# Patient Record
Sex: FEMALE | Race: WHITE | NOT HISPANIC OR LATINO | Employment: OTHER | URBAN - METROPOLITAN AREA
[De-identification: names, ages, dates, MRNs, and addresses within clinical notes are randomized per-mention and may not be internally consistent; named-entity substitution may affect disease eponyms.]

---

## 2017-02-27 ENCOUNTER — HISTORICAL (OUTPATIENT)
Dept: INTERNAL MEDICINE | Facility: CLINIC | Age: 52
End: 2017-02-27

## 2017-05-08 ENCOUNTER — HISTORICAL (OUTPATIENT)
Dept: ADMINISTRATIVE | Facility: HOSPITAL | Age: 52
End: 2017-05-08

## 2017-05-08 LAB
COLOR STL: NORMAL
CONSISTENCY STL: NORMAL
HEMOCCULT SP1 STL QL: NEGATIVE

## 2017-05-12 LAB
COLOR STL: NORMAL
CONSISTENCY STL: NORMAL
HEMOCCULT SP2 STL QL: NEGATIVE

## 2017-05-20 LAB
COLOR STL: NORMAL
CONSISTENCY STL: NORMAL

## 2017-05-24 ENCOUNTER — HISTORICAL (OUTPATIENT)
Dept: ADMINISTRATIVE | Facility: HOSPITAL | Age: 52
End: 2017-05-24

## 2017-09-20 ENCOUNTER — HISTORICAL (OUTPATIENT)
Dept: ADMINISTRATIVE | Facility: HOSPITAL | Age: 52
End: 2017-09-20

## 2017-10-03 ENCOUNTER — HISTORICAL (OUTPATIENT)
Dept: ADMINISTRATIVE | Facility: HOSPITAL | Age: 52
End: 2017-10-03

## 2018-04-04 ENCOUNTER — HISTORICAL (OUTPATIENT)
Dept: ADMINISTRATIVE | Facility: HOSPITAL | Age: 53
End: 2018-04-04

## 2018-04-11 ENCOUNTER — HISTORICAL (OUTPATIENT)
Dept: ADMINISTRATIVE | Facility: HOSPITAL | Age: 53
End: 2018-04-11

## 2018-05-17 ENCOUNTER — HISTORICAL (OUTPATIENT)
Dept: ADMINISTRATIVE | Facility: HOSPITAL | Age: 53
End: 2018-05-17

## 2018-06-08 ENCOUNTER — HISTORICAL (OUTPATIENT)
Dept: ADMINISTRATIVE | Facility: HOSPITAL | Age: 53
End: 2018-06-08

## 2018-06-26 ENCOUNTER — HISTORICAL (OUTPATIENT)
Dept: ADMINISTRATIVE | Facility: HOSPITAL | Age: 53
End: 2018-06-26

## 2019-01-04 ENCOUNTER — HISTORICAL (OUTPATIENT)
Dept: ADMINISTRATIVE | Facility: HOSPITAL | Age: 54
End: 2019-01-04

## 2019-02-05 ENCOUNTER — HISTORICAL (OUTPATIENT)
Dept: ADMINISTRATIVE | Facility: HOSPITAL | Age: 54
End: 2019-02-05

## 2019-07-16 ENCOUNTER — HISTORICAL (OUTPATIENT)
Dept: ADMINISTRATIVE | Facility: HOSPITAL | Age: 54
End: 2019-07-16

## 2019-07-25 ENCOUNTER — HISTORICAL (OUTPATIENT)
Dept: ADMINISTRATIVE | Facility: HOSPITAL | Age: 54
End: 2019-07-25

## 2019-08-22 ENCOUNTER — HISTORICAL (OUTPATIENT)
Dept: ADMINISTRATIVE | Facility: HOSPITAL | Age: 54
End: 2019-08-22

## 2019-08-28 ENCOUNTER — HISTORICAL (OUTPATIENT)
Dept: ADMINISTRATIVE | Facility: HOSPITAL | Age: 54
End: 2019-08-28

## 2019-09-27 ENCOUNTER — HISTORICAL (OUTPATIENT)
Dept: ADMINISTRATIVE | Facility: HOSPITAL | Age: 54
End: 2019-09-27

## 2019-12-13 ENCOUNTER — HISTORICAL (OUTPATIENT)
Dept: ADMINISTRATIVE | Facility: HOSPITAL | Age: 54
End: 2019-12-13

## 2019-12-13 LAB
ABS NEUT (OLG): 2.85 X10(3)/MCL (ref 2.1–9.2)
ALBUMIN SERPL-MCNC: 3.9 GM/DL (ref 3.4–5)
ALBUMIN/GLOB SERPL: 1 RATIO (ref 1–2)
ALP SERPL-CCNC: 116 UNIT/L (ref 45–117)
ALT SERPL-CCNC: 46 UNIT/L (ref 13–56)
AST SERPL-CCNC: 18 UNIT/L (ref 15–37)
BILIRUB SERPL-MCNC: 0.2 MG/DL (ref 0.2–1)
BILIRUBIN DIRECT+TOT PNL SERPL-MCNC: <0.1 MG/DL (ref 0–0.2)
BILIRUBIN DIRECT+TOT PNL SERPL-MCNC: >0.1 MG/DL (ref 0–1)
BUN SERPL-MCNC: 21 MG/DL (ref 7–18)
CALCIUM SERPL-MCNC: 8.8 MG/DL (ref 8.5–10.1)
CHLORIDE SERPL-SCNC: 107 MMOL/L (ref 98–107)
CHOLEST SERPL-MCNC: 196 MG/DL (ref 0–199)
CHOLEST/HDLC SERPL: 4 {RATIO}
CO2 SERPL-SCNC: 30 MMOL/L (ref 21–32)
CORTIS SERPL-SCNC: 5 MCG/DL
CREAT SERPL-MCNC: 0.6 MG/DL (ref 0.55–1.02)
DEPRECATED CALCIDIOL+CALCIFEROL SERPL-MC: 15 NG/ML (ref 30–80)
ERYTHROCYTE [DISTWIDTH] IN BLOOD BY AUTOMATED COUNT: 13.4 % (ref 11.5–17)
EST. AVERAGE GLUCOSE BLD GHB EST-MCNC: 103 MG/DL
ESTRADIOL SERPL HS-MCNC: 21 PG/ML
FSH SERPL-ACNC: 58.8 MIU/ML
FT4I SERPL CALC-MCNC: 1.93 (ref 2.6–3.6)
GLOBULIN SER-MCNC: 4 GM/DL (ref 2–4)
GLUCOSE SERPL-MCNC: 91 MG/DL (ref 74–106)
HBA1C MFR BLD: 5.2 % (ref 4.2–6.3)
HCT VFR BLD AUTO: 39.7 % (ref 37–47)
HDLC SERPL-MCNC: 50 MG/DL
HGB BLD-MCNC: 12.6 GM/DL (ref 12–16)
LDLC SERPL CALC-MCNC: 118 MG/DL (ref 0–129)
MCH RBC QN AUTO: 30.1 PG (ref 27–31)
MCHC RBC AUTO-ENTMCNC: 31.7 GM/DL (ref 33–36)
MCV RBC AUTO: 95 FL (ref 80–94)
NRBC BLD AUTO-RTO: 0 % (ref 0–0.2)
PLATELET # BLD AUTO: 294 X10(3)/MCL (ref 130–400)
PMV BLD AUTO: 9.5 FL (ref 7.4–10.4)
POTASSIUM SERPL-SCNC: 3.9 MMOL/L (ref 3.5–5.1)
PROGEST SERPL-MCNC: <0.21 NG/ML
PROT SERPL-MCNC: 7.7 GM/DL (ref 6.4–8.2)
RBC # BLD AUTO: 4.18 X10(6)/MCL (ref 4.2–5.4)
SODIUM SERPL-SCNC: 140 MMOL/L (ref 136–145)
T3FREE SERPL-MCNC: 2.54 PG/ML (ref 2.18–3.98)
T3RU NFR SERPL: 28 % (ref 31–39)
T4 SERPL-MCNC: 6.9 MCG/DL (ref 4.7–13.3)
TESTOST SERPL-MCNC: 11 NG/DL (ref 14–76)
TRIGL SERPL-MCNC: 142 MG/DL (ref 0–149)
TSH SERPL-ACNC: 2.36 MIU/ML (ref 0.36–3.74)
VIT B12 SERPL-MCNC: 455 PG/ML (ref 193–986)
VLDLC SERPL CALC-MCNC: 28 MG/DL
WBC # SPEC AUTO: 5.4 X10(3)/MCL (ref 4.5–11.5)

## 2020-01-02 ENCOUNTER — HISTORICAL (OUTPATIENT)
Dept: ADMINISTRATIVE | Facility: HOSPITAL | Age: 55
End: 2020-01-02

## 2020-02-11 ENCOUNTER — HISTORICAL (OUTPATIENT)
Dept: ADMINISTRATIVE | Facility: HOSPITAL | Age: 55
End: 2020-02-11

## 2020-03-20 ENCOUNTER — HISTORICAL (OUTPATIENT)
Dept: ADMINISTRATIVE | Facility: HOSPITAL | Age: 55
End: 2020-03-20

## 2020-07-28 ENCOUNTER — HISTORICAL (OUTPATIENT)
Dept: ADMINISTRATIVE | Facility: HOSPITAL | Age: 55
End: 2020-07-28

## 2020-07-28 LAB
ALBUMIN SERPL-MCNC: 3.6 GM/DL (ref 3.5–5)
ALBUMIN/GLOB SERPL: 1 RATIO (ref 1.1–2)
ALP SERPL-CCNC: 104 UNIT/L (ref 40–150)
ALT SERPL-CCNC: 48 UNIT/L (ref 0–55)
AST SERPL-CCNC: 36 UNIT/L (ref 5–34)
BILIRUB SERPL-MCNC: 0.3 MG/DL (ref 0.2–1.2)
BILIRUBIN DIRECT+TOT PNL SERPL-MCNC: 0.1 MG/DL (ref 0–0.5)
BILIRUBIN DIRECT+TOT PNL SERPL-MCNC: 0.2 MG/DL (ref 0–0.8)
BUN SERPL-MCNC: 13.3 MG/DL (ref 9.8–20.1)
CALCIUM SERPL-MCNC: 9.7 MG/DL (ref 8.4–10.2)
CHLORIDE SERPL-SCNC: 103 MMOL/L (ref 98–107)
CO2 SERPL-SCNC: 30 MMOL/L (ref 22–29)
CREAT SERPL-MCNC: 0.78 MG/DL (ref 0.57–1.11)
GLOBULIN SER-MCNC: 3.6 GM/DL (ref 2.4–3.5)
GLUCOSE SERPL-MCNC: 87 MG/DL (ref 74–100)
POTASSIUM SERPL-SCNC: 4.4 MMOL/L (ref 3.5–5.1)
PROT SERPL-MCNC: 7.2 GM/DL (ref 6.4–8.3)
SODIUM SERPL-SCNC: 142 MMOL/L (ref 136–145)

## 2020-08-20 ENCOUNTER — HISTORICAL (OUTPATIENT)
Dept: ADMINISTRATIVE | Facility: HOSPITAL | Age: 55
End: 2020-08-20

## 2020-08-20 LAB
ABS NEUT (OLG): 2.85 X10(3)/MCL (ref 2.1–9.2)
ALBUMIN SERPL-MCNC: 3.6 GM/DL (ref 3.5–5)
ALBUMIN/GLOB SERPL: 1 RATIO (ref 1.1–2)
ALP SERPL-CCNC: 102 UNIT/L (ref 40–150)
ALT SERPL-CCNC: 35 UNIT/L (ref 0–55)
AST SERPL-CCNC: 26 UNIT/L (ref 5–34)
BASOPHILS # BLD AUTO: 0.02 X10(3)/MCL (ref 0–0.2)
BASOPHILS NFR BLD AUTO: 0.3 % (ref 0–1)
BILIRUB SERPL-MCNC: 0.3 MG/DL (ref 0.2–1.2)
BILIRUBIN DIRECT+TOT PNL SERPL-MCNC: 0.1 MG/DL (ref 0–0.5)
BILIRUBIN DIRECT+TOT PNL SERPL-MCNC: 0.2 MG/DL (ref 0–0.8)
BUN SERPL-MCNC: 20 MG/DL (ref 9.8–20.1)
CALCIUM SERPL-MCNC: 9.3 MG/DL (ref 8.4–10.2)
CHLORIDE SERPL-SCNC: 107 MMOL/L (ref 98–107)
CHOLEST SERPL-MCNC: 245 MG/DL
CHOLEST/HDLC SERPL: 5 {RATIO} (ref 0–5)
CO2 SERPL-SCNC: 28 MMOL/L (ref 22–29)
CREAT SERPL-MCNC: 0.78 MG/DL (ref 0.57–1.11)
DEPRECATED CALCIDIOL+CALCIFEROL SERPL-MC: 110.2 NG/ML (ref 6.6–49.9)
EOSINOPHIL # BLD AUTO: 0.17 X10(3)/MCL (ref 0–0.9)
EOSINOPHIL NFR BLD AUTO: 2.7 % (ref 0–6.4)
ERYTHROCYTE [DISTWIDTH] IN BLOOD BY AUTOMATED COUNT: 13.5 % (ref 11.5–17)
FT4I SERPL CALC-MCNC: 1.83 (ref 2.6–3.6)
GLOBULIN SER-MCNC: 3.5 GM/DL (ref 2.4–3.5)
GLUCOSE SERPL-MCNC: 97 MG/DL (ref 74–100)
HCT VFR BLD AUTO: 37.8 % (ref 37–47)
HDLC SERPL-MCNC: 46 MG/DL (ref 40–60)
HGB BLD-MCNC: 11.9 GM/DL (ref 12–16)
IMM GRANULOCYTES # BLD AUTO: 0.01 10*3/UL (ref 0–0.02)
IMM GRANULOCYTES NFR BLD AUTO: 0.2 % (ref 0–0.43)
LDLC SERPL CALC-MCNC: 166 MG/DL (ref 50–140)
LYMPHOCYTES # BLD AUTO: 2.85 X10(3)/MCL (ref 0.6–4.6)
LYMPHOCYTES NFR BLD AUTO: 45.2 % (ref 16–44)
MCH RBC QN AUTO: 29.3 PG (ref 27–31)
MCHC RBC AUTO-ENTMCNC: 31.5 GM/DL (ref 33–36)
MCV RBC AUTO: 93.1 FL (ref 80–94)
MONOCYTES # BLD AUTO: 0.4 X10(3)/MCL (ref 0.1–1.3)
MONOCYTES NFR BLD AUTO: 6.3 % (ref 4–12.1)
NEUTROPHILS # BLD AUTO: 2.85 X10(3)/MCL (ref 2.1–9.2)
NEUTROPHILS NFR BLD AUTO: 45.3 % (ref 43–73)
NRBC BLD AUTO-RTO: 0 % (ref 0–0.2)
PLATELET # BLD AUTO: 288 X10(3)/MCL (ref 130–400)
PMV BLD AUTO: 10.4 FL (ref 7.4–10.4)
POTASSIUM SERPL-SCNC: 4.2 MMOL/L (ref 3.5–5.1)
PROT SERPL-MCNC: 7.1 GM/DL (ref 6.4–8.3)
RBC # BLD AUTO: 4.06 X10(6)/MCL (ref 4.2–5.4)
SODIUM SERPL-SCNC: 141 MMOL/L (ref 136–145)
T3FREE SERPL-MCNC: 2.67 PG/ML (ref 1.71–3.71)
T3RU NFR SERPL: 30.21 % (ref 31–39)
T4 SERPL-MCNC: 6.06 UG/DL (ref 4.87–11.72)
TRIGL SERPL-MCNC: 164 MG/DL (ref 0–150)
TSH SERPL-ACNC: 1.69 UIU/ML (ref 0.35–4.94)
VLDLC SERPL CALC-MCNC: 33 MG/DL
WBC # SPEC AUTO: 6.3 X10(3)/MCL (ref 4.5–11.5)

## 2021-06-01 ENCOUNTER — HISTORICAL (OUTPATIENT)
Dept: ADMINISTRATIVE | Facility: HOSPITAL | Age: 56
End: 2021-06-01

## 2021-06-17 ENCOUNTER — HISTORICAL (OUTPATIENT)
Dept: ADMINISTRATIVE | Facility: HOSPITAL | Age: 56
End: 2021-06-17

## 2021-06-17 LAB
ABS NEUT (OLG): 2.74 X10(3)/MCL (ref 2.1–9.2)
ALBUMIN SERPL-MCNC: 3.6 GM/DL (ref 3.5–5)
ALBUMIN/GLOB SERPL: 1 RATIO (ref 1.1–2)
ALP SERPL-CCNC: 97 UNIT/L (ref 40–150)
ALT SERPL-CCNC: 24 UNIT/L (ref 0–55)
AST SERPL-CCNC: 22 UNIT/L (ref 5–34)
BASOPHILS # BLD AUTO: 0.02 X10(3)/MCL (ref 0–0.2)
BASOPHILS NFR BLD AUTO: 0.3 % (ref 0–1)
BILIRUB SERPL-MCNC: 0.3 MG/DL (ref 0.2–1.2)
BILIRUBIN DIRECT+TOT PNL SERPL-MCNC: 0.1 MG/DL (ref 0–0.5)
BILIRUBIN DIRECT+TOT PNL SERPL-MCNC: 0.2 MG/DL (ref 0–0.8)
BUN SERPL-MCNC: 19.4 MG/DL (ref 9.8–20.1)
CALCIUM SERPL-MCNC: 9.3 MG/DL (ref 8.4–10.2)
CHLORIDE SERPL-SCNC: 105 MMOL/L (ref 98–107)
CHOLEST SERPL-MCNC: 215 MG/DL
CHOLEST/HDLC SERPL: 5 {RATIO} (ref 0–5)
CO2 SERPL-SCNC: 27 MMOL/L (ref 22–29)
CREAT SERPL-MCNC: 0.69 MG/DL (ref 0.57–1.11)
DEPRECATED CALCIDIOL+CALCIFEROL SERPL-MC: 43.2 NG/ML (ref 30–80)
EOSINOPHIL # BLD AUTO: 0.3 X10(3)/MCL (ref 0–0.9)
EOSINOPHIL NFR BLD AUTO: 4.9 % (ref 0–6.4)
ERYTHROCYTE [DISTWIDTH] IN BLOOD BY AUTOMATED COUNT: 13.7 % (ref 11.5–17)
FT4I SERPL CALC-MCNC: 1.45 (ref 2.6–3.6)
GLOBULIN SER-MCNC: 3.5 GM/DL (ref 2.4–3.5)
GLUCOSE SERPL-MCNC: 96 MG/DL (ref 74–100)
HCT VFR BLD AUTO: 37.3 % (ref 37–47)
HDLC SERPL-MCNC: 46 MG/DL (ref 40–60)
HGB BLD-MCNC: 11.7 GM/DL (ref 12–16)
IMM GRANULOCYTES # BLD AUTO: 0.02 10*3/UL (ref 0–0.02)
IMM GRANULOCYTES NFR BLD AUTO: 0.3 % (ref 0–0.43)
LDLC SERPL CALC-MCNC: 138 MG/DL (ref 50–140)
LYMPHOCYTES # BLD AUTO: 2.69 X10(3)/MCL (ref 0.6–4.6)
LYMPHOCYTES NFR BLD AUTO: 43.5 % (ref 16–44)
MCH RBC QN AUTO: 28.8 PG (ref 27–31)
MCHC RBC AUTO-ENTMCNC: 31.4 GM/DL (ref 33–36)
MCV RBC AUTO: 91.9 FL (ref 80–94)
MONOCYTES # BLD AUTO: 0.41 X10(3)/MCL (ref 0.1–1.3)
MONOCYTES NFR BLD AUTO: 6.6 % (ref 4–12.1)
NEUTROPHILS # BLD AUTO: 2.74 X10(3)/MCL (ref 2.1–9.2)
NEUTROPHILS NFR BLD AUTO: 44.4 % (ref 43–73)
NRBC BLD AUTO-RTO: 0 % (ref 0–0.2)
PLATELET # BLD AUTO: 299 X10(3)/MCL (ref 130–400)
PMV BLD AUTO: 9.8 FL (ref 7.4–10.4)
POTASSIUM SERPL-SCNC: 3.6 MMOL/L (ref 3.5–5.1)
PROT SERPL-MCNC: 7.1 GM/DL (ref 6.4–8.3)
RBC # BLD AUTO: 4.06 X10(6)/MCL (ref 4.2–5.4)
SODIUM SERPL-SCNC: 140 MMOL/L (ref 136–145)
T3FREE SERPL-MCNC: 2.6 PG/ML (ref 1.58–3.91)
T3RU NFR SERPL: 27.9 % (ref 31–39)
T4 SERPL-MCNC: 5.18 UG/DL (ref 4.87–11.72)
TRIGL SERPL-MCNC: 154 MG/DL (ref 0–150)
TSH SERPL-ACNC: 2.69 UIU/ML (ref 0.35–4.94)
VLDLC SERPL CALC-MCNC: 31 MG/DL
WBC # SPEC AUTO: 6.2 X10(3)/MCL (ref 4.5–11.5)

## 2022-04-04 ENCOUNTER — HISTORICAL (OUTPATIENT)
Dept: SURGERY | Facility: HOSPITAL | Age: 57
End: 2022-04-04

## 2022-04-07 ENCOUNTER — HISTORICAL (OUTPATIENT)
Dept: ADMINISTRATIVE | Facility: HOSPITAL | Age: 57
End: 2022-04-07

## 2022-04-23 VITALS
WEIGHT: 209 LBS | DIASTOLIC BLOOD PRESSURE: 74 MMHG | HEIGHT: 62 IN | BODY MASS INDEX: 38.46 KG/M2 | SYSTOLIC BLOOD PRESSURE: 113 MMHG

## 2022-04-30 NOTE — ED PROVIDER NOTES
Patient:   Adrianna Childers             MRN: [id number]            FIN: [id number]               Age:   52 years     Sex:  Female     :  1965   Associated Diagnoses:   Pain in toe; Plantar fasciitis   Author:   Kleber Christianson      Basic Information   Time seen: Immediately upon arrival.   History source: Patient.   Arrival mode: Private vehicle.   History limitation: None.   Additional information: Chief Complaint from Nursing Triage Note : Chief Complaint   2017 18:30 CDT      Chief Complaint           c/o bilateral feet pain x3 weeks; denies new trauma; reports 5th toe pain on left foot after hitting it on daughters wheelchair  .      History of Present Illness   The patient presents with bilateral, toe pain, patient reports bilateral 5th toe pain after bumping a wheelchair while at home; patient also reports bilateral foot arch pain.  The onset was 3  weeks ago.  The course/duration of symptoms is constant.  Type of injury: direct blow.  The character of symptoms is pain.  The degree at present is minimal.  There are exacerbating factors including movement, walking and palpation.  The relieving factor is none.  The location where the incident occurred was at home.  Risk factors consist of none.  Prior episodes: none.  Therapy today: none.  Associated symptoms: denies altered sensation, denies nausea, denies vomiting, denies inability to bear weight, denies fever, denies chills, denies back pain, denies neck pain and denies suspected foreign body.        Review of Systems   Constitutional symptoms:  Negative except as documented in HPI.   Skin symptoms:  Negative except as documented in HPI.   Eye symptoms:  Negative except as documented in HPI.   ENMT symptoms:  Negative except as documented in HPI.   Respiratory symptoms:  Negative except as documented in HPI.   Cardiovascular symptoms:  Negative except as documented in HPI.   Gastrointestinal symptoms:  Negative except as documented  in HPI.   Genitourinary symptoms:  Negative except as documented in HPI.   Musculoskeletal symptoms:  Reports: Foot, pain.   Neurologic symptoms:  Negative except as documented in HPI.   Psychiatric symptoms:  Negative except as documented in HPI.   Endocrine symptoms:  Negative except as documented in HPI.   Hematologic/Lymphatic symptoms:  Negative except as documented in HPI.   Allergy/immunologic symptoms:  Negative except as documented in HPI.             Additional review of systems information: All other systems reviewed and otherwise negative.      Health Status   Allergies:    Allergic Reactions (Selected)  No Known Allergies  No Known Medication Allergies,    Allergies (2) Active Reaction  No Known Allergies None Documented  No Known Medication Allergies None Documented  .   Medications:  (Selected)   Prescriptions  Prescribed  Cymbalta 20 mg oral delayed release capsule: 20 mg = 1 cap(s), Oral, BID, (do not crush or chew), # 60 cap(s), 0 Refill(s), other reason (Rx)  Senokot 8.6 mg oral tablet: 17.2 mg = 2 tab(s), Oral, Once a day (at bedtime), PRN PRN as needed for constipation, # 60 tab(s), 0 Refill(s), Pharmacy: [id number]  gabapentin 300 mg oral capsule: 300 mg = 1 cap(s), Oral, TID, # 90 cap(s), 0 Refill(s)  Documented Medications  Documented  DULOXETINE HCL DR 30MG CER: 30 mg = 1 cap(s), Oral, BID  Feosol 200 mg (65 mg elemental iron) oral tablet: 0 Refill(s)  Iron 100 Plus oral tablet: 0 Refill(s)  METHOCARBAMOL 750 MG TABLET:   NAPROXEN 500 MG TABLET: 500 mg = 1 tab(s), Oral, BID  Norco 10 mg-325 mg oral tablet: 1 tab(s), Oral, TID, PRN PRN for pain, 0 Refill(s)  PREDNISONE 10 MG TABLET:   Xanax 0.5 mg oral tablet: 0.5 mg = 1 tab(s), Oral, At Bedtime, PRN for anxiety, # 30 tab(s), 0 Refill(s)  melatonin 10 mg oral capsule: 10 mg = 1 cap(s), Oral, Once a day (at bedtime), 0 Refill(s)  naproxen 500 mg (as sodium) oral tablet extended release: 500 mg = 1 tab(s), Oral, Daily, 0 Refill(s).    Immunizations: Per nurse's notes.   Menstrual history: Per nurse's notes.      Past Medical/ Family/ Social History   Medical history:    Active  Arthritis (3744065)  Chronic neck pain (3875211895).   Surgical history:     delivery ([id number]).  Dilation and curettage (33746018).  C SEC X2.   section ([id number]).  Comments:  2017 14:45 - Ramesh MASTERS, Kala garcia 2.   Family history:    Lupus  Brother  Cirrhosis of liver  Sister  Kidney disease  Brother  Acute myocardial infarction.  Sister  .   Social history: Reviewed as documented in chart.   Problem list: Per nurse's notes.      Physical Examination               Vital Signs   Vital Signs   2017 18:30 CDT      Temperature Oral          36.6 DegC                             Peripheral Pulse Rate     85 bpm                             Respiratory Rate          18 br/min                             SpO2                      99 %                             Oxygen Therapy            Room air                             Systolic Blood Pressure   140 mmHg                             Diastolic Blood Pressure  70 mmHg  .      Vital Signs (last 24 hrs)_____  Last Charted___________  Temp Oral     36.6 DegC  (SEP 20 18:30)  Heart Rate Peripheral   85 bpm  (SEP 20 18:30)  Resp Rate         18 br/min  (SEP 20 18:30)  SBP      140 mmHg  (SEP 20 18:30)  DBP      70 mmHg  (SEP 20 18:30)  SpO2      99 %  (SEP 20 18:30)  Weight      103.3 kg  (SEP 20 18:30)  .   Measurements   2017 18:30 CDT      Weight Dosing             103.3 kg                             Weight Measured           103.3 kg                             Weight Measured and Calculated in Lbs     227.74 lb                             Height/Length Dosing      162 cm                             Height/Length Estimated   162 cm  .   Basic Oxygen Information   2017 18:30 CDT      SpO2                      99 %                             Oxygen Therapy            Room air  .    General:  Alert, no acute distress.    Skin:  No pallor, normal for ethnicity.    Eye:  Extraocular movements are intact, normal conjunctiva.    Cardiovascular:  Regular rate and rhythm, No edema, Arterial pulses: Bilateral, lower extremity, dorsalis pedis, normal.    Respiratory:  Lungs are clear to auscultation, respirations are non-labored, breath sounds are equal, Symmetrical chest wall expansion.    Gastrointestinal:  Soft, Nontender, Non distended, Normal bowel sounds, No organomegaly.    Musculoskeletal:  Normal ROM, normal strength, no swelling, no deformity, Ankle/foot: Bilateral, plantar, foot, metatarsal # 5, tenderness.    Neurological:  Alert and oriented to person, place, time, and situation, No focal neurological deficit observed.    Psychiatric:  Cooperative, appropriate mood & affect, normal judgment, non-suicidal.       Medical Decision Making   Orders  Launch Orders   Pharmacy:  Percocet 5/325 (Order): 1 tab(s), Oral, Now, first dose 9/20/2017 19:23 CDT, stop date 9/20/2017 19:23 CDT.   Results review:     No qualifying data available.   Foot/toes x-ray findings:  No fracture      Impression and Plan   Diagnosis   Pain in toe (RHJ69-UN M79.676)   Plantar fasciitis (GLD13-GF M72.2)   Plan   Condition: Stable, pt denies abdominal pain/nausea/vomiting/syncope/chest pain/shortness of breath.    Disposition: Discharged: Time  9/20/2017 19:56:00, to home.    Patient was given the following educational materials: Musculoskeletal Pain, Plantar Fasciitis.    Follow up with: Anytime the conditions worsen, return to clinic or go to ED; Karli Gorman.    Counseled: Patient, Family, Regarding diagnosis, Regarding diagnostic results, Regarding treatment plan, Regarding prescription, Patient indicated understanding of instructions.

## 2022-04-30 NOTE — ED PROVIDER NOTES
reports left 5th toe pain after hitting it today on wheelchair. reports right atraumatic foot pain

## 2022-04-30 NOTE — ED PROVIDER NOTES
Patient:   Adrianna Childers             MRN: [id number]            FIN: [id number]               Age:   54 years     Sex:  Female     :  1965   Associated Diagnoses:   Right knee pain   Author:   Archie Cross MD      Basic Information   History source: Patient.   Arrival mode: Private vehicle.   History limitation: None.   Additional information: Chief Complaint from Nursing Triage Note : Chief Complaint   2019 11:13 CDT      Chief Complaint           c/o right knee pain that starts in the hip and radiates down to her knee.  hx knee replacement on 2019.  States she may have twisted her knee on   .   Provider/Visit info:   Time Seen:  Archie Cross MD / 2019 11:18  .   History of Present Illness   The patient presents with knee pain and knee swelling.  The onset was 5  days ago.  The course/duration of symptoms is worsening.  Type of injury: twisting.  Location: Right knee leg. The character of symptoms is pain.  The degree at present is moderate.  The exacerbating factor is movement.  The relieving factor is none.  Risk factors consist of recent surgery.  Prior episodes: none.  Therapy today: none.  Associated symptoms: none.  Additional history: none.  s/p TKA  (Dr. Luna).        Review of Systems   Constitutional symptoms:  Negative except as documented in HPI.   Skin symptoms:  Negative except as documented in HPI.   Eye symptoms:  Negative except as documented in HPI.   ENMT symptoms:  Negative except as documented in HPI.   Respiratory symptoms:  Negative except as documented in HPI.   Cardiovascular symptoms:  Negative except as documented in HPI.   Gastrointestinal symptoms:  Negative except as documented in HPI.   Genitourinary symptoms:  Negative except as documented in HPI.   Musculoskeletal symptoms:  Negative except as documented in HPI.   Neurologic symptoms:  Negative except as documented in HPI.   Psychiatric symptoms:  Negative except as  documented in HPI.   Endocrine symptoms:  Negative except as documented in HPI.   Hematologic/Lymphatic symptoms:  Negative except as documented in HPI.   Allergy/immunologic symptoms:  Negative except as documented in HPI.             Additional review of systems information: All other systems reviewed and otherwise negative.      Health Status   Allergies:    Allergic Reactions (Selected)  No Known Allergies  No Known Medication Allergies,    Allergies (2) Active Reaction  No Known Allergies None Documented  No Known Medication Allergies None Documented  .   Medications:  (Selected)   Prescriptions  Prescribed  Colace 100 mg oral capsule: 200 mg = 2 cap(s), Oral, Daily, # 56 cap(s), 0 Refill(s)  Senokot 8.6 mg oral tablet: 17.2 mg = 2 tab(s), Oral, Once a day (at bedtime), PRN PRN as needed for constipation, # 60 tab(s), 0 Refill(s), Pharmacy: [id number]  acetaminophen-hydrocodone 325 mg-10 mg oral tablet: 1 tab(s), Oral, q4hr, PRN PRN as needed for pain, # 50 tab(s), 0 Refill(s), Pharmacy: [id number]  aspirin 81 mg oral Delayed Release (EC) tablet: 81 mg = 1 tab(s), Oral, BID, # 84 tab(s), 0 Refill(s)  gabapentin 300 mg oral capsule: See Instructions, TAKE ONE CAPSULE BY MOUTH THREE TIMES DAILY, # 90 cap(s), eRx: [facility]  indomethacin 25 mg oral capsule: 25 mg = 1 cap(s), Oral, TID, PRN PRN for pain, # 30 cap(s), 0 Refill(s), Pharmacy: [id number]  Documented Medications  Documented  BUPROPION HCL  MG TABLET: 100 mg = 1 tab(s), Oral, qAM  DESVENLAFAXINE SUC ER 50 MG TB: 50 mg = 1 tab(s), Oral, Daily  DULOXETINE HCL DR 30 MG CAP: 30 mg = 1 cap(s), Oral, Daily  DULOXETINE HCL DR 60 MG CAP: 60 mg = 1 cap(s), Oral, Daily  Feosol 200 mg (65 mg elemental iron) oral tablet: 1 tab, Oral, Daily, 0 Refill(s)  Iron 100 Plus oral tablet: 1 tab(s), Oral, Daily, 0 Refill(s)  NAPROXEN 500 MG TABLET: 500 mg = 1 tab(s), Oral, BID  Xanax 0.5 mg oral tablet: 0.5 mg = 1  tab(s), Oral, At Bedtime, PRN for anxiety, # 30 tab(s), 0 Refill(s)  cyclobenzaprine 10 mg oral tablet: 10 mg = 1 tab(s), Oral, TID, 0 Refill(s)  traZODONE 50 mg oral tablet ( Desyrel ): 50 mg = 1 tab(s), Oral, qPM.      Past Medical/ Family/ Social History   Medical history:    Active  Arthritis ([id number])  Chronic neck pain (8962292456).   Surgical history:    Total Knee Arthroplasty (Right) on 2019 at 54 Years.  Comments:  2019 10:24 LYNETTE Abraham RN, Soledad WELCH  auto-populated from documented surgical case   delivery ([id number]).  Dilation and curettage (81218699).  C SEC X2.   section ([id number]).  Comments:  2017 14:45 LYNETTE Chandler RN, Kala garcia 2.   Family history:    Carotid artery disease  Mother  Lupus  Brother  Diabetes mellitus type 2  Mother  Stroke  Father  Cirrhosis of liver  Sister  Seizure  Father  Kidney disease  Brother  Acute myocardial infarction.  Sister  Mother  Hypertension.  Father  Hyperlipidemia.  Father  .   Social history:    Social & Psychosocial Habits    Alcohol  2015 Risk Assessment: Denies Alcohol Use    2016  Use: Past    Frequency: 1-2 times per year    2017  Use: Current    Type: Wine    Frequency: 1-2 times per year    2019  Use: Current    Type: Wine    Frequency: 1-2 times per year    Employment/School  2017  Description: Applying for SSI    Activity level: Moderate physical work    Highest education: High school    Hazardous equipment operation: No    2019  Status: Disabled    Exercise  2016  Self assessment: Good condition    Comment: no exercise - 2015 13:24 - Kyler Juan BRADSHAW    2019  Times per week: 3-4 times/week    Exercise type: yard work    Home/Environment  2016  Lives with: Children    Living situation: Home/Independent    Home equipment: Wheelchair, hosp. bed,lift    Alcohol abuse in household: No    Substance abuse in household: No    Smoker in household: No     Injuries/Abuse/Neglect in household: No    Feels unsafe at home: No    Safe place to go: Yes    Agency(s)/Others notified: No    Family/Friends available to help: Yes    Concern for family members at home: No    Major illness in household: No    Financial concerns: No    Concerns over TV/Computer/Game use: No    Other risks in environment: Pets/Animal exposure    Comment: has 2 children - 02/16/2016 10:06 - Santiago Flores LPN    08/28/2019  Lives with: Children    Nutrition/Health  04/03/2017  Type of diet: Regular    Caffeine intake amount: 1 soda/week    Wants to lose weight: No    Sleeping concerns: No    Feels highly stressed: Yes    Other    Comment: denies any falls over last 3 months - 10/05/2016 13:05 - Narcisa Robertson LPN    Sexual    Comment: confidential - 10/05/2016 13:05 Narcisa Kwon LPN    Substance Use  06/27/2015 Risk Assessment: Denies Substance Abuse    09/29/2015  Use: Never    Tobacco  06/27/2015 Risk Assessment: Denies Tobacco Use    09/24/2019  Use: Former smoker, quit more    Patient Wants Consult For Cessation Counseling No    09/27/2019  Use: Former smoker, quit more    Patient Wants Consult For Cessation Counseling N/A    Concerns about tobacco use in household: No    Smokeless tobacco use: Never    Abuse/Neglect  09/24/2019  SHX Any signs of abuse or neglect No    09/27/2019  SHX Any signs of abuse or neglect No    Feels unsafe at home: No    Safe place to go: Yes  .   Problem list:    Active Problems (14)  Anxiety   Arthritis   Back pain   Chronic neck pain   Degenerative disc disease, lumbar   Depression   IBS - Irritable bowel syndrome   Left knee pain   Obesity   Onychomycosis   Osteoarthritis of right knee   Rotator cuff tear   Visit for screening mammogram   Well adult exam   .      Physical Examination               Vital Signs   Vital Signs   9/27/2019 11:13 CDT      Temperature Oral          37 DegC                             Temperature Oral (calculated)              98.60 DegF                             Peripheral Pulse Rate     87 bpm                             Respiratory Rate          18 br/min                             SpO2                      97 %                             Oxygen Therapy            Room air                             Systolic Blood Pressure   111 mmHg                             Diastolic Blood Pressure  71 mmHg  .   Measurements   9/27/2019 11:13 CDT      Weight Dosing             95 kg                             Weight Measured and Calculated in Lbs     209.44 lb                             Weight Estimated          95 kg                             Height/Length Dosing      158 cm                             Height/Length Estimated   158 cm                             Body Mass Index Estimated 38.05 kg/m2  .   Basic Oxygen Information   9/27/2019 11:13 CDT      SpO2                      97 %                             Oxygen Therapy            Room air  .   General:  Alert, no acute distress.    Skin:  Warm, dry, pink.    Head:  Normocephalic, atraumatic.    Neck:  Supple, trachea midline, no tenderness, no JVD, no carotid bruit.    Eye:  Pupils are equal, round and reactive to light, extraocular movements are intact, normal conjunctiva, vision unchanged.    Ears, nose, mouth and throat:  Tympanic membranes clear, oral mucosa moist, no pharyngeal erythema or exudate.    Cardiovascular:  Regular rate and rhythm, No murmur, Normal peripheral perfusion, No edema.    Respiratory:  Lungs are clear to auscultation, respirations are non-labored, breath sounds are equal, Symmetrical chest wall expansion.    Chest wall:  No tenderness, No deformity.    Back:  Nontender, Normal range of motion, Normal alignment.    Musculoskeletal:  Normal ROM, normal strength, no tenderness, no swelling, no deformity, Lower extremity: Right, lateral, knee, tenderness, no swelling.    Gastrointestinal:  Soft, Nontender, Non distended, Normal bowel sounds, No  organomegaly.    Neurological:  Alert and oriented to person, place, time, and situation, No focal neurological deficit observed, CN II-XII intact, normal sensory observed, normal motor observed, normal speech observed, normal coordination observed.    Lymphatics:  No lymphadenopathy.   Psychiatric:  Cooperative, appropriate mood & affect, normal judgment.       Medical Decision Making   Orders  Launch Orders   Pharmacy:  [facility] (Order Processing): 100 mcg, form: Injection, IM, Once, first dose 9/27/2019 11:25 CDT, stop date 9/27/2019 11:25 CDT, STAT  Radiology:  US Venous Lower Extremity Right (Order Processing): Routine, 9/27/2019 11:25 CDT, Leg Pain, None, Stretcher, Rad Type, Schedule this test  XR Knee Right 3 Views (Order Processing): Stat, 9/27/2019 11:25 CDT, Pain, None, Stretcher, Rad Type, Not Scheduled.   Radiology results:  Rad Results (ST)  < 12 hrs   Accession: [id number]  Order: US Venous Lower Extremity Right  Report Dt/Tm: 09/27/2019 12:33  Report:   Comparison: No study for comparison     INDICATION: Evaluate for deep venous thrombosis.     FINDINGS:     The right common femoral vein, superficial femoral vein, deep femoral  vein, popliteal vein, posterior tibial, and peroneal veins appear  fully compressible and demonstrate no intraluminal thrombus.  Augmentation imaging demonstrates no evidence for proximal occlusion.     IMPRESSION:     No sonographic evidence for deep venous thrombosis to the right lower  extremity.      Accession: [id number]  Order: XR Knee Right 3 Views  Report Dt/Tm: 09/27/2019 12:07  Report:   Comparison: Radiograph right knee used for comparison dated 9/4/2019.     INDICATION: 3 weeks postop     FINDINGS:     Total knee arthroplasty in good position with no evidence for  component loosening. A moderate size knee joint effusion is present  and is nonspecific. Patella is anatomic. No periosteal reaction. The  polyethylene spacer is symmetric.     IMPRESSION:     Skin  staples have been removed.     A moderate size knee joint effusion is present and is nonspecific.     Total knee arthroplasty in good position with no evidence for  loosening      .      Reexamination/ Reevaluation   Vital signs   Basic Oxygen Information   9/27/2019 11:13 CDT      SpO2                      97 %                             Oxygen Therapy            Room air        Impression and Plan   Diagnosis   Right knee pain (JCH89-DS M25.561)   Plan   Condition: Improved, Stable.    Disposition: Discharged: Time  9/27/2019 12:38:00, to home.    Prescriptions: Launch prescriptions   Pharmacy:  Percocet 10/325 oral tablet (Prescribe): 1 tab, Oral, q6hr, X 5 day(s), # 20 tab(s), 0 Refill(s)  DESVENLAFAXINE SUC ER 50 MG TB (Discontinue): 9/27/2019 12:38 CDT  acetaminophen-hydrocodone 325 mg-10 mg oral tablet (Discontinue): 9/27/2019 12:38 CDT  cyclobenzaprine 10 mg oral tablet (Discontinue): 9/27/2019 12:38 CDT.    Patient was given the following educational materials: Knee Pain, Adult, Knee Pain, Adult.    Limitations: Limited activity.    Follow up with: Gold Luna Within 1 week.    Counseled: Patient, Regarding diagnosis, Regarding diagnostic results, Regarding treatment plan, Regarding prescription, Patient indicated understanding of instructions, I informed the patient of the risks associated with opiate use and the option to fill less than the prescribed amount..

## 2022-04-30 NOTE — ED PROVIDER NOTES
"   Patient:   Adrianna Childers             MRN: [id number]            FIN: [id number]               Age:   51 years     Sex:  Female     :  1965   Associated Diagnoses:   Chronic lower back pain; Facet sclerosis; Leg neuralgia   Author:   Jewel Rivas      Basic Information   Time seen: Date & time 2017 13:10:00.   History source: Patient.   Arrival mode: Private vehicle.   History limitation: None.   Additional information: Chief Complaint from Nursing Triage Note : Chief Complaint   2017 13:05 CDT      Chief Complaint           pt c/o spine pain, leg and feet pain x few months. being seen by pain management for same but states she "wants to figure out what is causing it" ambulatory into triage.  .      History of Present Illness   The patient presents with back pain.  The onset was chronic.  The course/duration of symptoms is constant.  Type of injury: none.  Location: Lumbar sacral. Radiating pain: bilateral lower extremities. The character of symptoms is achy.  The degree at onset was moderate.  The degree at present is moderate.  The exacerbating factor is movement.  The relieving factor is none.  Prior episodes: chronic.  Therapy today: none.  Associated symptoms: denies bowel dysfunction, denies bladder dysfunction, denies altered sensation, denies focal weakness, denies saddle numbness, denies abdominal pain, denies fever, denies chills, denies nausea, denies vomiting, denies dysuria and denies hematuria.  Additional history: Pt reports Hx for chronic neck and back pain. On pain management currently for pain symptoms. Presents to [facility] ED for eval of lower back pain that is radiating into each leg. Denies injury to area. Reports pain since "she was 14." Denies recent injury, loss of bowel or bladder control, chest pain, or SOB. Pt currently tearful. States, "I feel like no one is doing anything for me." Requesting MRI of back due to pain symptoms. .        Review of Systems "   Constitutional symptoms:  Negative except as documented in HPI.   Skin symptoms:  Negative except as documented in HPI.   Eye symptoms:  Negative except as documented in HPI.   ENMT symptoms:  Negative except as documented in HPI.   Respiratory symptoms:  Negative except as documented in HPI.   Cardiovascular symptoms:  Negative except as documented in HPI.   Gastrointestinal symptoms:  Negative except as documented in HPI.   Genitourinary symptoms:  Negative except as documented in HPI.   Musculoskeletal symptoms:  Negative except as documented in HPI.   Neurologic symptoms:  Negative except as documented in HPI.   Psychiatric symptoms:  Negative except as documented in HPI.   Endocrine symptoms:  Negative except as documented in HPI.   Hematologic/Lymphatic symptoms:  Negative except as documented in HPI.   Allergy/immunologic symptoms:  Negative except as documented in HPI.      Health Status   Allergies:    Allergic Reactions (Selected)  No Known Allergies  No Known Medication Allergies,    Allergies (2) Active Reaction  No Known Allergies None Documented  No Known Medication Allergies None Documented  .   Medications:  (Selected)   Prescriptions  Prescribed  Ciclodan 8% topical solution: 1 jolie, TOP, Daily, # 6.6 mL, 6 Refill(s), Pharmacy: [facility]  Cymbalta 20 mg oral delayed release capsule: 20 mg = 1 cap(s), Oral, BID, do not crush or chew, # 60 cap(s), 3 Refill(s), Pharmacy: [facility]  Senokot 8.6 mg oral tablet: 17.2 mg = 2 tab(s), Oral, Once a day (at bedtime), PRN PRN as needed for constipation, # 60 tab(s), 0 Refill(s), Pharmacy: [id number]  Documented Medications  Documented  Flexeril 10 mg oral tablet: 10 mg = 1 tab(s), Oral, Daily, PRN for spasm, # 30 tab(s), 0 Refill(s)  Iron 100 Plus oral tablet: 0 Refill(s)  Norco 10 mg-325 mg oral tablet: 1 tab(s), Oral, TID, PRN PRN for pain, 0 Refill(s)  Xanax 0.5 mg oral tablet: 0.5 mg = 1 tab(s), Oral, At Bedtime, PRN  for anxiety, # 30 tab(s), 0 Refill(s)  naproxen 500 mg (as sodium) oral tablet extended release: 500 mg = 1 tab(s), Oral, Daily, 0 Refill(s), per nurse's notes.   Immunizations: Per nurse's notes.   Menstrual history: Per nurse's notes.      Past Medical/ Family/ Social History   Medical history:    Active  Arthritis (7974351)  Chronic neck pain (0139691232), Reviewed as documented in chart.   Surgical history:     delivery ([id number]).  Dilation and curettage ([id number]).  C SEC X2., Reviewed as documented in chart.   Family history:    Lupus  Brother  Cirrhosis of liver  Sister  Kidney disease  Brother  Acute myocardial infarction.  Sister  .   Social history: Alcohol use: Denies, Tobacco use: Denies, Drug use: Denies.   Problem list:    Active Problems (9)  Anxiety   Arthritis   Back pain   Chronic neck pain   Depression   Obesity   Onychomycosis   Rotator cuff tear   Well adult exam   .      Physical Examination               Vital Signs   Vital Signs   2017 13:05 CDT      Temperature Oral          36.8 DegC                             Peripheral Pulse Rate     99 bpm                             Respiratory Rate          16 br/min                             SpO2                      100 %                             Systolic Blood Pressure   161 mmHg  HI                             Diastolic Blood Pressure  95 mmHg  HI  .      Vital Signs (last 24 hrs)_____  Last Charted___________  Temp Oral     36.8 DegC  (MAY 24 13:05)  Heart Rate Peripheral   99 bpm  (MAY 24 13:05)  Resp Rate         16 br/min  (MAY 24 13:05)  SBP      H 161mmHg  (MAY 24 13:)  DBP      H 95mmHg  (MAY 24 13:05)  SpO2      100 %  (MAY 24 13:)  Weight      98.85 kg  (MAY 24 13:05)  Height      157.48 cm  (MAY 24 13:05)  BMI      39.86  (MAY 24 13:05)  .   Measurements   2017 13:05 CDT      Weight Measured           98.85 kg                             Height/Length Measured    157.48 cm                             Body  Mass Index Measured  39.86 kg/m2  .   Basic Oxygen Information   5/24/2017 13:05 CDT      SpO2                      100 %  .   General:  Alert, mild distress.    Skin:  Warm, dry, pink, intact.    Head:  Normocephalic, atraumatic.    Neck:  Supple, trachea midline, no tenderness.    Eye:  Pupils are equal, round and reactive to light, extraocular movements are intact, normal conjunctiva, vision unchanged.    Ears, nose, mouth and throat:  Tympanic membranes clear, oral mucosa moist, no pharyngeal erythema or exudate.    Cardiovascular:  Regular rate and rhythm, No murmur, Normal peripheral perfusion, No edema.    Respiratory:  Lungs are clear to auscultation, respirations are non-labored, breath sounds are equal, Symmetrical chest wall expansion.    Chest wall:  No tenderness, No deformity.    Back:  Normal range of motion, Normal alignment, No costovertebral angle tenderness, , Lumbar: Diffuse, mild, tenderness, muscle spasms present, no vertebral point tenderness, Sacral: Diffuse, mild, tenderness, no vertebral point tenderness.    Musculoskeletal:  Normal ROM, normal strength, Lower extremity: Bilateral, posterior, thigh, tibia, fibula, calf, tenderness, no swelling, no erythema.    Gastrointestinal:  Soft, Nontender, Non distended, Normal bowel sounds, Guarding: Negative, Rebound: Negative, Organomegaly: Negative, Mass: Negative, Signs: McBurney's negative, Ford's negative.    Genitourinary:  No tenderness, no discharge, normal external genitalia, no lesions.    Neurological:  Alert and oriented to person, place, time, and situation, No focal neurological deficit observed, CN II-XII intact, normal sensory observed, normal motor observed, normal speech observed, normal coordination observed.    Lymphatics:  No lymphadenopathy.   Psychiatric:  Cooperative, appropriate mood & affect, normal judgment.       Medical Decision Making   Differential Diagnosis:  Degenerative disc disease, disc herniation, sciatica,  chronic back pain.    Documents reviewed:  Prior records.   Orders  [facility] Orders   Radiology:  XR Spine Lumbar 2 or 3 Views (Order): Stat, 5/24/2017 13:24 CDT, Back Pain, None, Wheelchair, Rad Type.   Radiology results:  Rad Results (ST)  < 12 hrs   Accession: [id number]  Order: XR Spine Lumbar 2 or 3 Views  Report Dt/Tm: 05/24/2017 13:49  Report:   History.  Low back pain     Reference.  No priors     Findings.  Frontal and lateral views of the lumbar spine. No subluxation. No  significant loss of vertebral body height. There is multilevel disc  space narrowing with disc osteophytes most conspicuous L4-L5. Facet  sclerosis most conspicuous L5-S1.     Impression.  Degenerative changes.      .      Reexamination/ Reevaluation   Time: 5/24/2017 14:02:00 .   Course: improving.   Pain status: decreased.   Notes: Reassessed patient at this time. Reports condition has improved. Discussed with patient all pertinent ED information and results. Discussed diagnosis and treatment plan with patient. Follow up instructions and return to ED instruction have been given. All questions and concerns were addressed at this time. Patient voices understanding of information and instructions. Patient is comfortable with plan and discharge. Patient is stable for discharge. .      Impression and Plan   Diagnosis   Chronic lower back pain (BCM10-NR M54.5)   Facet sclerosis (FGK10-VJ M48.8X9)   Leg neuralgia (YQK79-EU G57.90)   Plan   Condition: Improved, Stable, Pt nontoxic in appearance. Continues to deny chest pain, SOB, or weakness..    Disposition: Discharged: Time  5/24/2017 14:03:00, to home.    Prescriptions: Launch prescriptions   Pharmacy:  gabapentin 300 mg oral capsule (Prescribe): See Instructions, 1 cap once daily x 1 day, 1 cap twice daily x 1 day, 1 cap three times daily, # 90 cap(s), 0 Refill(s)  Admit/Transfer/Discharge:  Discharge (Order): Home.    Patient was given the following educational materials: Back Exercises,  Easy-to-Read, Back Pain, Adult, Easy-to-Read.    Follow up with: Karli Gorman; Anytime the conditions worsen, return to clinic or go to ED; Follow up with Arranged Physician; Follow up with primary care physician in the next 1-2 weeks for further eval.; If pain persists discuss with PCP benefit versus risk of MRI of lumbar spine..    Counseled: Patient, Regarding diagnosis, Regarding diagnostic results, Regarding treatment plan, Regarding prescription, Patient indicated understanding of instructions.

## 2022-04-30 NOTE — PROGRESS NOTES
MNT OUTPATIENT EDUCATION   Nutrition Diagnosis  Problem:   Food & Nutrition Related Knowledge Deficit       Related to:  Medical Diagnosis  As Evidenced by:  Limited prior knowledge / health professional referral    Nutrition Prescription / Intervention  MNT Education Completed on:      Healthy Eating:  Instructed patient on heart healthy eating and weight management; low fat, cholesterol, and sodium foods; better food choices; portion sizes; healthy choices when cooking and / or eating out; reading food labels; increasing activity.    Other Education:     Level of Understanding:   Good  Expected Compliance:   Good  Appropriate Handouts Given: (age specific / literacy): yes  Barriers to learning: None     Nutrition Prescription:  Diet order prescribed per MD / RD    Goal:  Patient will adhere to prescribed MNT meal plan as instructed by RD    Monitoring / Evaluation    R.DKoby will monitor: JAVADN

## 2022-05-01 NOTE — HISTORICAL [facility] CERNER
This is a historical note converted from Eduardo. Formatting and pictures may have been removed.  Please reference Eduardo for original formatting and attached multimedia. Chief Complaint  F/U bilat knee pain -here for updated MRI -xrays and exam for new referral for TKA--and repeated injection  History of Present Illness  52?yo?female?non-smoker?presents to ortho clinic for?routine follow up?for?bilateral?knee pain.? Patient points to ?medial knee. R>L  Onset: ?Insidious over years??progressively worsening  Current pain level: 10/10??medication helping. Quality described as?sharp?Patient reports using?RX / OTC?(Norco RX per pain management, meloxicam?RX per ortho)?medications PRN pain with?mild?relief.? Patient?acknowledges?use of pain medication today.?  Modifying Factors: ?Worse with/after activity;Improved with rest;??Stiffness after immobilization??Stiffness improved with less than 30 minutes of activity  Previous treatment:Conservative treatment for at least 3 months with HEP/ medications.?RX NSAIDs, PT, HEP, CSI in past with some relief in left knee/ no relief in right knee, pain medications per pain management with inadequate relief in right knee/ some relief in left knee.  Previous injuries:?Denies  Associated Symptoms:?Crepitus/Grinding; ?No numbness or tingling;??swelling with?increased acitivity;?No skin changes;?Weakness of right knee;?Moderate decrease in ROM  Activity:?Sedentary, full ADLs;?Pain occasionally interferes with ADLs (moderate)?Patient?acknowledges?use of assistive devices, walker,?for assistance with ambulation.  Family History:?Family history of arthritis  PCP:?Karli Gorman NP [facility]  Employment: retired  NOTE: Patient previously referred to [facility] for TKA.? They are requesting up to date xrays for processing.? Patient here to day to complete chart and re-refer for TKA of right knee.  Review of Systems  Constitutional:No fever;No chills;No weight loss  CV:No swelling;No  edema  GI:No urinary retention;No urinary incontinence  :No fecal incontinence  Skin:No rash;No wound  Neuro:No numbness/tingling;No weakness;No saddle anesthesia  MSK: As above  Psych:No depression;No anxiety  Heme/Lymph:No easy bruising;No easy bleeding;No lymphadenopathy  Immuno:No MRSA history  Physical Exam  Vitals & Measurements  HR:?90(Peripheral)? RR:?20? BP:?105/71?  HT:?157.48?cm? HT:?157.48?cm? WT:?100.5?kg? WT:?100.5?kg? BMI:?40.52?  MSK: Bilateral??KNEE  General: No apparent distress;?obese  Inspection:?limping;??partial weight bearing;??no swelling;?no erythema;?No contusion;?atrophy / deconditioning noted- mild quad?left; moderate right;?noted genu valgum?right knee?  Palpation:?tenderness noted at lateral, medial joint lines and patellar tendon?right knee; medial joint line tenderness in left knee;tenderness noted at medial retinaculum?right knee;?Crepitus:?Positive?right knee;?Normal temperature  ROM:?  ?????Active Extension/Flexion (0-140):?5-110 (R); 5-115 (L)  Strength:?  ?????Flexion??3/5 right; 4/5 left  ?????Extension??3/5 right; 4/5 left  Special Tests:  ?????Ballotable Effusion: ?Positive ?right; negative left  ?????Fluid Wave:?Positive?right;?negative left  ?????Anterior Drawer:Negative?  ?????Lachman:?Negative?  ?????Pivot Shift:Positive?right; negative left  ?????Posterior Drawer:?Negative  ?????Patellar Grind: ?Positive?right; negative left  ?????Emily:?Negative?  ?????Apleys Compression:?Negative  Neurovascular:?Intact; 2+?distal pulse, sensation intact to light touch  Neuro/Psych: Awake, Alert, Oriented; Normal mood and affect  Lymphatic: No LAD  Skin and Soft Tissue: No bruising, No ecchymosis; No rash; Skin intact  Assessment/Plan  1.?Osteoarthritis of knees, bilateral,?Osteoarthritis of knees, bilateral  ?1.? Discussed with patient diagnosis and treatment recommendations.? Handout given.  2.? Imaging:?Radiological studies ordered and independently reviewed; Discussed with  patient;  3.? Treatment plan: Conservative treatment to include oral glucosamine 1500 mg/day; Topical capsaicin as needed; Hot or cold therapy; Adequate vitamin C/D intake  4.? Procedure:?Discussed CSI vs VS injections as treatment options; since conservative measures did not improve symptoms patient consented for CSI today?for left knee  5.? Activity:?Activity as tolerated; HEP to included aerobic conditioning with non-painful activity and ROM/STG exercises; proper footwear; assistive device to avoid limping;rubber band provided for HEP; off loading brace for right medial knee RX  6.? Therapy:Formal PT/OT ordered  7.? Medication:?First line treatment with daily ?acetaminophen 1000 mg three times daily; Medication precautions given; RX meloxicam daily for symptom relief  8.? RTC:?3 months  9.? Additional work-up:?None  Ordered:  asp/inj jnt/bursa, major 83155 PC, 06/08/18 8:43:00 CDT, [facility], Routine, 06/08/18 8:43:00 CDT  Clinic Follow up, *Est. 09/08/18 3:00:00 CDT, Order for future visit, Osteoarthritis of knees, bilateral, [facility]  Office/Outpatient Visit Level 4 Established 69529 , 25, Osteoarthritis of knees, bilateral, [facility], 06/08/18 8:43:00 CDT  ?  2.?Obesity  Patient educated that diet modifications with low impact exercises as tolerated for reduction in BMI would improve overall treatment and outcome.  Current BMI 40%  Goal weight at tis time for decreased symptoms: 180 pounds  ?  Knee pain, bilateral  ?same as above  Ordered:  XR Knee Right 4 or More Views, Routine, 06/08/18 8:11:00 CDT, Pain, None, Ambulatory, Rad Type, Knee pain, bilateral, 06/08/18 8:11:00 CDT  ?  Osteoarthritis of right knee  ?referral to ortho surgeon for TKA of right knee  Ordered:  External Referral, TKA right knee s/t end stage OA, ortho, [facility], BMI 40%, no cardiac history/ no DM; failure of conservative treatments including: RX NSAIDs, HEP/PT; CSI; pain medications., 06/08/18 9:15:00 CDT,  Osteoarthritis of right knee  External Referral, TKA right knee s/t end stage OA, ortho, [facility], BMI 40%, no cardiac history/ no DM; failure of conservative treatments including: RX NSAIDs, HEP/PT; CSI; pain medications., 18 9:15:00 CDT, Osteoarthritis of right knee  ?  Orders:  meloxicam, 15 mg = 1 tab(s), Oral, Daily, with food; Do NOT take with other NSAIDs, # 30 tab(s), 3 Refill(s), Pharmacy: [id number]   Problem List/Past Medical History  Ongoing  Anxiety  Arthritis  Back pain  Chronic neck pain  Degenerative disc disease, lumbar  Depression  Left knee pain  Obesity  Onychomycosis  Rotator cuff tear  Visit for screening mammogram  Well adult exam  Historical  No qualifying data  Procedure/Surgical History  C SEC X2,  delivery,  section, Dilation and curettage.  Medications  acetaminophen-hydrocodone 325 mg-10 mg oral tablet, 1 tab(s), Oral, q6hr  BUPROPION HCL  MG TABLET, 100 mg= 1 tab(s), Oral, qAM  cyclobenzaprine 10 mg oral tablet, 10 mg= 1 tab(s), Oral, TID  DESVENLAFAXINE SUC ER 50 MG TB, 50 mg= 1 tab(s), Oral, Daily  DULOXETINE HCL DR 30 MG CAP, 30 mg= 1 cap(s), Oral, Daily  DULOXETINE HCL DR 60 MG CAP, 60 mg= 1 cap(s), Oral, Daily  Feosol 200 mg (65 mg elemental iron) oral tablet  gabapentin 300 mg oral capsule, See Instructions  Iron 100 Plus oral tablet, 1 tab(s), Oral, Daily  Mobic 15 mg oral tablet, 15 mg= 1 tab(s), Oral, Daily, 3 refills  NAPROXEN 500 MG TABLET, 500 mg= 1 tab(s), Oral, BID  Senokot 8.6 mg oral tablet, 17.2 mg= 2 tab(s), Oral, Once a day (at bedtime), PRN  Xanax 0.5 mg oral tablet, 0.5 mg= 1 tab(s), Oral, At Bedtime, PRN  ZOLPIDEM TARTRATE 10 MG TABLET, 10 mg= 1 tab(s), Oral, qPM,? ?Not Taking per Prescriber  Allergies  No Known Allergies  No Known Medication Allergies  Social History  Alcohol - Denies Alcohol Use, 2015  Current, Wine, 1-2 times per year, 2017  Past, 1-2 times per year,  06/06/2016  Employment/School  Work/School description: Applying for SSI. Activity level: Moderate physical work. Highest education level: High school. Operates hazardous equipment: No., 04/03/2017  Exercise  Self assessment: Good condition., 02/16/2016  Home/Environment  Lives with Children. Living situation: Home/Independent. Home equipment: Wheelchair, hosp. bed,lift. Alcohol abuse in household: No. Substance abuse in household: No. Smoker in household: No. Injuries/Abuse/Neglect in household: No. Feels unsafe at home: No. Safe place to go: Yes. Agency(s)/Others notified: No. Family/Friends available for support: Yes. Concern for family members at home: No. Major illness in household: No. Financial concerns: No. TV/Computer concerns: No. Risks in environment: Pets/Animal exposure., 02/16/2016  Nutrition/Health  Regular, Caffeine intake amount: 1 soda/week. Wants to lose weight: No. Sleeping concerns: No. Feels highly stressed: Yes., 04/03/2017  Other  Sexual  Substance Abuse - Denies Substance Abuse, 06/27/2015  Never, 09/29/2015  Tobacco - Denies Tobacco Use, 06/27/2015  Former smoker, 10/21/2015  Family History  Acute myocardial infarction.: Sister.  Cirrhosis of liver: Sister.  Kidney disease: Brother.  Lupus: Brother.  Immunizations  Vaccine Date Status   tetanus/diphtheria/pertussis, acel(Tdap) 02/16/2017 Given   influenza virus vaccine, inactivated 10/05/2016 Given   Health Maintenance  Health Maintenance  ???Pending?(in the next year)  ??? ??Due?  ??? ? ? ?Colorectal Screening due??05/20/18??and every 1??year(s)  ??? ??Refused?  ??? ? ? ?Cervical Cancer Screening due??06/08/18??and every 5??year(s)  ??? ??Due In Future?  ??? ? ? ?Influenza Vaccine not due until??10/02/18??and every 1??year(s)  ??? ? ? ?Lipid Screening not due until??02/16/19??and every 1??year(s)  ??? ? ? ?Depression Screening not due until??04/04/19??and every 1??year(s)  ??? ? ? ?Alcohol Misuse Screening not due until??04/04/19??and  every 1??year(s)  ???Satisfied?(in the past 1 year)  ??? ??Satisfied?  ??? ? ? ?Alcohol Misuse Screening on??04/04/18.??Satisfied by Karli Gorman NP  ??? ? ? ?Blood Pressure Screening on??06/08/18.??Satisfied by Nevaeh Collier LPN  ??? ? ? ?Body Mass Index Check on??06/08/18.??Satisfied by Nevaeh Collier LPN  ??? ? ? ?Breast Cancer Screening on??04/11/18.??Satisfied by Candice Cuello  ??? ? ? ?Depression Screening on??04/04/18.??Satisfied by Narcisa Robertson LPN.  ??? ? ? ?Diabetes Screening on??02/16/18.??Satisfied by Brenda Domínguez  ??? ? ? ?Lipid Screening on??02/16/18.??Satisfied by Brenda Domínguez  ??? ? ? ?Obesity Screening on??06/08/18.??Satisfied by Nevaeh Collier LPN  ??? ??Refused?  ??? ? ? ?Cervical Cancer Screening on??04/04/18.??Recorded for Karli Gorman NP??Reason: Patient Refuses  ?  ?  Diagnostic Results  06/08/2018 08:22 CDT XR Knee Left 4 or More Views  Radiology Report  Standing views of both knees with AP and lateral view of the left  knee.  Clinical history is pain.  ?There are degenerative changes most pronounced in the medial joint  compartment on the right. There are degenerative changes in the medial  joint compartment on the left. The left knee appears to be in varus.  IMPRESSION: Degenerative changes as described above.  ?  ?     [1]?XR Knee Left 4 or More Views; Lili QUEVEDO, Jonny MÉNDEZ 06/08/2018 08:22 CDT

## 2022-05-01 NOTE — HISTORICAL [facility] CERNER
This is a historical note converted from Eduardo. Formatting and pictures may have been removed.  Please reference Eduardo for original formatting and attached multimedia. OPERATIVE REPORT  ?  DATE: 9/4/2019  ?  ASSISTANT: None  ?  PREOPERATIVE DIAGNOSIS:  1. ?Right knee osteoarthritis  ?  POSTOPERATIVE DIAGNOSIS:  Same  ?  PROCEDURES:  1. ?Right custom total knee arthroplasty  ?  ANESTHESIA:  Spinal anesthesia  ?  BLOOD LOSS:  Less than 30 cc  ?  DVT PROPHYLAXIS:  Aspirin BID for 30 days  ?  INSTRUMENTATION:  Instrumentation: Smith & Nephew knee system, size?4 Oxinium femur CR, size 3 tibial baseplate, 11 mm polyethylene dished insert, 26 mm patella Cruz & Nephew Rally cement with gentamicin  ?  PROCEDURE IN DETAIL:  ?  Custom Total Knee Arthroplasty  ?  After receiving antibiotics in the preoperative holding area, the patient was taken to the operating room. Patient was placed on the table and meticulously padded. A tourniquet was placed proximally, and the patient was prepped and draped in normal sterile fashion. A time out was performed and the correct operative extremity, antibiotics, and allergies were confirmed.  ?  The operative leg was elevated, exsanguinated, flexed the knee, and inflated the tourniquet. A midline incision and mid-vastus approach were made, which allowed the assistant and me to develop the operative field.? The femoral custom cutting block was placed in the femur.? It was pinned appropriately.? The distal femoral cuts were then made.? They were measured and confirmed with the preoperative templating.  ?  I then placed on the 4 in1 femoral cutting block and made the cuts.? These were measured and confirmed with the preoperative templating.? I make sure to check and confirm rotation prior to making the cuts.  ?  The anterior cruciate ligament and posterior cruciate ligament was excised.? I then placed on the custom tibial guide.? I pinned it in place.? I then made my tibial cut.? I measured  the cuts and confirmed with the preoperative templating.? I make sure the appropriate rotation was placed in the tibial plate and it lined up with the medial one third of the tibial tubercle.? This was done prior to making cuts.The box was cut into the femur for the PS polyethylene insert.? I then trialed the preop templated implants.? The knee moved with normal kinematics and was very stable through extension and flexion.  ?  The trial implants were then removed.? Spreaders were placed in the knee. Any osteophytes were removed, and any remaining menisci were excised. The final implants were cemented in place, starting with the tibia, then the femur, then the polyethylene insert, and lastly the patella.? Again,? the knee moved with normal kinematics and was very stable through extension and flexion. We copiously lavaged the knee. A layered closure was facilitated using #1 absorbable suture for the capsule, 2-0 absorbable sutures were used for the subcutaneous tissues.? 3-0 nylon was used for the skin. Then a sterile dressing was applied.  ?  Patient tolerated procedure well and went to the recovery room in stable condition.

## 2022-05-01 NOTE — HISTORICAL [facility] CERNER
This is a historical note converted from Eduardo. Formatting and pictures may have been removed.  Please reference Eduardo for original formatting and attached multimedia. Chief Complaint  Est patient here s/p Right TKA 9/4/19. Had a fall c/o increased pain. Xrays today.  History of Present Illness  This patient is approximately?5 months out from a right?knee replacement surgery.? She was doing amazingly well until she fell about a week ago. ?She has some soreness but nothing significant. ?She comes in today just to make sure there is nothing significantly?wrong with her knee.  Review of Systems  GENERAL: No fevers, chills, unexpected weight loss or gain  MUSCULOSKELETAL: Joint pain, extremity pain  Physical Exam  Vitals & Measurements  HR:?83(Peripheral)? BP:?106/73?  HT:?158?cm? WT:?97?kg? BMI:?38.86?  General: Well-developed, well-nourished.  Neuro: Alert and oriented x 3.  Psych: Normal mood and affect.  Knee Exam:  No obvious deformity. Range of motion from 0-120 degrees. Negative patella grind and equal subluxation of knee cap medial and lateral < 1cm. Negative patella tendon tenderness. Negative Lachman and anterior drawer test. Negative posterior drawer test. Negative varus and valgus stress test. Negative medial joint line tenderness. Negative lateral joint line tenderness. 5/5 strength and normal skin appearance. Sensibility normal. [1]  Assessment/Plan  1.?Status post total right knee replacement?Z96.651  ?She ambulates normally?with no major?issues.? Her x-rays show no signs of any implant loosening or hardware failure.? She will continue with her normal activities. ?I explained her that this should improve the next several weeks.? She will come back to see me sometime in May.  Ordered:  Office/Outpatient Visit Level 3 Established [id number] PC, Status post total right knee replacement, Orthopaedics Clinic, 02/11/20 14:49:00 CST  XR Knee Right 3 Views, Routine, 02/11/20 15:34:00 CST, Post-Op, None,  Ambulatory, Rad Type, Status post total right knee replacement, Not Scheduled, 20 15:34:00 CST  ?  2.?Fall?W19.XXXA  ?   Problem List/Past Medical History  Ongoing  Anxiety  Arthritis  Back pain  Chronic neck pain  Chronic pain syndrome  Degenerative disc disease, lumbar  Depression  IBS - Irritable bowel syndrome  Left knee pain  Lumbar radiculopathy  Lumbar spinal stenosis  Muscle spasms of lower extremity  Obesity  Onychomycosis  Osteoarthritis of right knee  Rotator cuff tear  Status post total right knee replacement  Visit for screening mammogram  Well adult exam  Historical  No qualifying data  Procedure/Surgical History  46337 - INJ FORAMEN EPIDURAL LUM / SAC 1 LEVEL (Right) (2020)  23787 - INJ FORAMEN EPIDURAL LUM / SAC EA ADDL LEVEL (Right) (2020)  Injection(s), anesthetic agent and/or steroid, transforaminal epidural, with imaging guidance (fluoroscopy or CT); lumbar or sacral, each additional level (List separately in addition to code for primary procedure) (2020)  Injection(s), anesthetic agent and/or steroid, transforaminal epidural, with imaging guidance (fluoroscopy or CT); lumbar or sacral, single level (2020)  Introduction of Anesthetic Agent into Spinal Canal, Percutaneous Approach (2020)  Introduction of Anti-inflammatory into Spinal Canal, Percutaneous Approach (2020)  Replacement of Right Knee Joint with Synthetic Substitute, Cemented, Open Approach (2019)  Total Knee Arthroplasty (Right) (2019)  C SEC X2   delivery   section  Colonoscopy  Dilation and curettage   Medications  aspirin 81 mg oral Delayed Release (EC) tablet, 81 mg= 1 tab(s), Oral, BID,? ?Not taking: Last Dose Date/Time Unknown  BUPROPION HCL  MG TABLET, 100 mg= 1 tab(s), Oral, qAM  Colace 100 mg oral capsule, 200 mg= 2 cap(s), Oral, Daily,? ?Not taking: Last Dose Date/Time Unknown  DULOXETINE HCL DR 30 MG CAP, 30 mg= 1 cap(s), Oral, Daily  DULOXETINE HCL  DR 60 MG CAP, 60 mg= 1 cap(s), Oral, Daily  Feosol 200 mg (65 mg elemental iron) oral tablet, 1 tab, Oral, Daily  gabapentin 300 mg oral capsule, See Instructions  hydrOXYzine hydrochloride 50 mg oral tablet, 50 mg= 1 tab(s), Oral, Daily  indomethacin 25 mg oral capsule, 25 mg= 1 cap(s), Oral, TID, PRN,? ?Not taking: Last Dose Date/Time Unknown  Iron 100 Plus oral tablet, 1 tab(s), Oral, Daily  Mobic  NAPROXEN 500 MG TABLET, 500 mg= 1 tab(s), Oral, BID  Norco 10 mg-325 mg oral tablet, 1 tab(s), Oral, q6hr, PRN  Percogesic Original Strength  Senokot 8.6 mg oral tablet, 17.2 mg= 2 tab(s), Oral, Once a day (at bedtime), PRN  traZODONE 50 mg oral tablet ( Desyrel ), 50 mg= 1 tab(s), Oral, qPM  Allergies  No Known Allergies  No Known Medication Allergies  Social History  Abuse/Neglect  No, 02/11/2020  Alcohol - Denies Alcohol Use, 06/27/2015  Current, Wine, 1-2 times per year, 08/28/2019  Employment/School  Disabled, 08/28/2019  Work/School description: Applying for SSI. Activity level: Moderate physical work. Highest education level: High school. Operates hazardous equipment: No., 04/03/2017  Exercise  Exercise frequency: 3-4 times/week. Exercise type: yard work., 08/28/2019  Self assessment: Good condition., 02/16/2016  Home/Environment  Lives with Children., 08/28/2019  Lives with Children. Living situation: Home/Independent. Home equipment: Wheelchair, hosp. bed,lift. Alcohol abuse in household: No. Substance abuse in household: No. Smoker in household: No. Injuries/Abuse/Neglect in household: No. Feels unsafe at home: No. Safe place to go: Yes. Agency(s)/Others notified: No. Family/Friends available for support: Yes. Concern for family members at home: No. Major illness in household: No. Financial concerns: No. TV/Computer concerns: No. Risks in environment: Pets/Animal exposure., 02/16/2016  Nutrition/Health  Regular, Caffeine intake amount: 1 soda/week. Wants to lose weight: No. Sleeping concerns: No. Feels highly  stressed: Yes., 04/03/2017  Other  Sexual  Substance Use - Denies Substance Abuse, 06/27/2015  Never, 09/29/2015  Tobacco - Denies Tobacco Use, 06/27/2015  Former smoker, quit more than 30 days ago, No, 02/11/2020  Family History  Acute myocardial infarction.: Mother and Sister.  Cardiac arrest.: Negative: Mother, Father, Sister and Brother.  Carotid artery disease: Mother.  Cirrhosis of liver: Sister.  Diabetes mellitus type 2: Mother.  Hyperlipidemia.: Father.  Hypertension.: Father.  Kidney disease: Brother.  Lupus: Brother.  Seizure: Father.  Stroke: Father.  Immunizations  Vaccine Date Status   tetanus/diphtheria/pertussis, acel(Tdap) 02/16/2017 Given   influenza virus vaccine, inactivated 10/05/2016 Given   Health Maintenance  Health Maintenance  ???Pending?(in the next year)  ??? ??OverDue  ??? ? ? ?Diabetes Screening due??and every?  ??? ? ? ?Colorectal Screening due??06/11/19??and every 1??year(s)  ??? ??Due?  ??? ? ? ?Cervical Cancer Screening due??02/11/20??and every?  ??? ??Due In Future?  ??? ? ? ?Aspirin Therapy for CVD Prevention not due until??09/05/20??and every 1??year(s)  ??? ? ? ?Alcohol Misuse Screening not due until??01/01/21??and every 1??year(s)  ??? ? ? ?Obesity Screening not due until??01/01/21??and every 1??year(s)  ??? ? ? ?Blood Pressure Screening not due until??02/10/21??and every 1??year(s)  ??? ? ? ?Body Mass Index Check not due until??02/10/21??and every 1??year(s)  ???Satisfied?(in the past 1 year)  ??? ??Satisfied?  ??? ? ? ?ADL Screening on??02/11/20.??Satisfied by Wendy Holder  ??? ? ? ?Alcohol Misuse Screening on??01/21/20.??Satisfied by Vita Chavira  ??? ? ? ?Aspirin Therapy for CVD Prevention on??09/05/19.??Satisfied by Sonal Skinner NP  ??? ? ? ?Blood Pressure Screening on??02/11/20.??Satisfied by Wendy Holder  ??? ? ? ?Body Mass Index Check on??02/11/20.??Satisfied by Wendy Holder  ??? ? ? ?Breast Cancer Screening on??08/22/19.??Satisfied  by Krissy Gomez  ??? ? ? ?Diabetes Screening on??12/13/19.??Satisfied by Samantha Nunn  ??? ? ? ?Influenza Vaccine on??01/30/20.??Satisfied by Colleen Pfeiffer RN  ??? ? ? ?Lipid Screening on??12/13/19.??Satisfied by Samantha Nunn  ??? ? ? ?Obesity Screening on??02/11/20.??Satisfied by Wendy Holder  ?     [1]?Office Visit Note; Jeremy QUEVEDO, Gold BROWN 01/21/2020 15:09 CST

## 2022-05-01 NOTE — HISTORICAL [facility] CERNER
This is a historical note converted from Eduardo. Formatting and pictures may have been removed.  Please reference Eduardo for original formatting and attached multimedia. Chief Complaint  Left knee problems  History of Present Illness  49 yo female here for PCP. PmHx anxiety/depression, arthritis, back pain, neck pain. Pt states she follows Dr. Nando Ha for pain management of back and neck pain. Pt c/o nerve pain down bilateral arms that Norco does not help. States has had nerve pain x several years. Also c/o hot flashes x > 1 years. Currently on Prozac but states ineffective for hot flashes. C/O increasing right knee pain causing some gait difficulties. Needs MG, PAP. Refuses flu vaccine.  ?   02/16/16 - Pt here for f/up visit. Pt reports tremendous improvement in neck/back/knee pain after injection at ortho clinic. Pt continues be under care of Dr. Ha for pain management. Pt states Lexapro prescribed for hot flashes did not work so Dr. Ha put her on Prozac which is working great for anxiety/depression. Pt states has been cutting back on fatty foods. Reports improvement in hot flashes. Denies any other problems or concerns at present.  ?   10/05/16 - Pt here for f/up visit. C/o constipation on regular basis. Denies improvement after trying several OTC medications; no improvement after using miralax and senokot. Pt admits to eating only 1 meal daily at the end of the day. Denies adequate fiber in diet. Denies abd pain, melena, bright-red stool. Also c/o left foot pain that is worse after standing for long periods and when first arising in the am. XR left foot WNL. Continues to see Dr. Ha for pain management and for management anxiety and depression which she states is controlled on Prozac. Denies current tobacco abuse. Needs MG in 3 months, needs PAP/FOBT. Denies any other problems or concerns this visit.  ?   02/17/17 - Pt presents today for routine f/up appt. Reports loss of brother recently which  as made her depression worse. States Prozac no longer working. States has taken Zoloft in the past with good results but it stopped working. Stopped ~ 5 years ago but would like to try it again. C/o depression, fatigue, weight gain. Denies HI/SI. Continues to be under care Dr. Ha for pain management? (cervicalgia) and anxiety. Pt reports eating healthier diet, has incorporated more salads. HLD improved. Also under care ortho clinic for right knee pain. Denies tobacco abuse. Denies fever, chills, night sweats, or any other problems or concerns this visit.  ?   04/03/17 - patient presents today for follow-up after initiating Zoloft for depression.? Patient reports Zoloft working?moderately, but is complaining of muscle aches.? Denies SI/HI.? Patient continues to see Dr. Ha for pain management and reports Norco not working well for the muscle?aches she is having.? Patient continues?to work as a  approximately 12 hours a day, and works out in the yard often.??Drinks plenty of water. Denies any other problems or concerns this visit.? Patient states she canceled GYN appointment and is going to call to reschedule.? Mammogram up-to-date  ?   06/05/17 - Pt presents today for follow up after initiating Cymbalta. Pt reports working okay. Agreeable to increasing dose for depression. Denies HI/SI. Also c/o constant neck and LBP. Pt currently under care of pain management for lumbar DDD.? Patient requesting MRI?due to?worsening?low back pain.? Reports?muscle pain when walking, bending, sitting.? Denies any recent injury/trauma.  ?   02/14/18 - Pt presents today for routine f/up appt. Continues to follow Dr. Ha for pain management. Following [facility] Carreon at [facility] and is currently taking Cymbalta for depression/anxiety and is attending counseling with Ms Boyd which she states is all helpful. Denies HI/SI. Also following Ortho clinic, [facility] appt pending. Needs MG. Refuses  all vaccines. States is unable to exercise d/t pain. Denies fever, chills, night sweats, N/V, HA, blurry vision, dizziness, CP, SOB, or any other problems or concerns this visit.  ?   04/04/18 - Pt presents today for requesting?MRI for lumbago w/ radiculopathy?and ortho referral for left knee pain. Pt initially seen by [facility] and completed PT for cervicalgia 06/05/2017. Pt states no PT on lumbar spine. Willing to try PT along w/ home exercises discussed. Reports LBP with minimal radiation laterally. No radiation down legs. Denies bowel/bladder incontinence, altered sensation, saddle numbness, falls.  Review of Systems  Constitutional: Negative.  Ear/Nose/Mouth/Throat: Negative.  Respiratory: No shortness of breath, No cough, No sputum production, No hemoptysis, No wheezing.  Cardiovascular: No chest pain, No palpitations, No peripheral edema, No syncope.  Breast: Negative.  Gastrointestinal: No nausea, No vomiting, No diarrhea, No constipation, No hematemesis Abdominal pain: All quadrants.  Genitourinary: Negative.  Hematology/Lymphatics: Negative.  Endocrine: Negative.  Immunologic: Negative.  Musculoskeletal:?+ back pain,?+ neck pain,?+ B Knee?joint pain, No muscle pain, No decreased range of motion.  Integumentary: Negative.  Neurologic: Negative except as documented in history of present illness.  ?  Physical Exam  Vitals & Measurements  T:?98.1? ?F (Oral)? HR:?67(Peripheral)? RR:?18? BP:?121/81?  HT:?157.48?cm? HT:?157.48?cm? WT:?100.1?kg? WT:?100.1?kg? BMI:?40.36?  General: Alert and oriented, No acute distress._  Eye: Pupils are equal, round and reactive to light, Extraocular movements are intact.  HENT: Normocephalic.  Neck: Supple, Non-tender, No carotid bruit, No lymphadenopathy.  Respiratory: Lungs are clear to auscultation, Respirations are non-labored, Breath sounds are equal, Symmetrical chest wall expansion.  Cardiovascular: Normal rate, Regular rhythm, No  murmur.  Gastrointestinal: Soft, Non-tender, Non-distended, Normal bowel sounds.  Musculoskeletal: Normal range of motion. Mild crepitus to left knee. Slight antalgic gait  Integumentary: Warm, Dry, Intact.  Neurologic: No focal deficits, Cranial Nerves II-XII are grossly intact. BLE 5/5 motor strength, + 2 BLE DTRs  Assessment/Plan  1.?Degenerative disc disease, lumbar  ? external referral to [facility] for eval & tx. Continue?home exercises as tolerated, pain managment  Ordered:  External Referral, Lumbago, PT, [facility], Eval & tx lumbago, 18 15:13:00 CDT, Degenerative disc disease, lumbar  Office/Outpatient Visit Level 4 Established [id number] PC, Degenerative disc disease, lumbar  Left knee pain, [facility], 18 15:22:00 CDT  ?  2.?Left knee pain  XR left knee. Continue pain management. Internal referral to ortho for evaluation.  Ordered:  Internal Referral to Orthopedics, left knee pain, *Est. 18 3:00:00 CDT, Future Visit?, Left knee pain  Office/Outpatient Visit Level 4 Established [id number] PC, Degenerative disc disease, lumbar  Left knee pain, [facility], 18 15:22:00 CDT  XR Knee Left 3 Views, Routine, *Est. 18 3:00:00 CDT, Arthritis, increasing pain, None, Ambulatory, standing, Rad Type, Order for future visit, Left knee pain, *Est. 18 3:00:00 CDT  ?  Keep scheduled appt next year with labs prior to visit.   Problem List/Past Medical History  Ongoing  Anxiety  Arthritis  Back pain  Chronic neck pain  Degenerative disc disease, lumbar  Depression  Left knee pain  Obesity  Onychomycosis  Rotator cuff tear  Visit for screening mammogram  Well adult exam  Historical  No qualifying data  Procedure/Surgical History  C SEC X2,  delivery,  section, Dilation and curettage.  Medications  acetaminophen-hydrocodone 325 mg-10 mg oral tablet, 1 tab(s), Oral, q6hr  BUPROPION HCL  MG TABLET, 100 mg= 1 tab(s), Oral, qAM  cyclobenzaprine 10  mg oral tablet, 10 mg= 1 tab(s), Oral, TID  DULOXETINE HCL DR 30MG CER, 30 mg= 1 cap(s), Oral, BID  DULOXETINE HCL DR 60 MG CAP, 60 mg= 1 cap(s), Oral, Daily  Feosol 200 mg (65 mg elemental iron) oral tablet  gabapentin 300 mg oral capsule, See Instructions  Iron 100 Plus oral tablet,? ?Not taking  Iron 100 Plus oral tablet, 1 tab(s), Oral, Daily  METHOCARBAMOL 750 MG TABLET, 750 mg= 1 tab(s), Oral, TID,? ?Not Taking per Prescriber  NAPROXEN 500 MG TABLET, 500 mg= 1 tab(s), Oral, BID  Senokot 8.6 mg oral tablet, 17.2 mg= 2 tab(s), Oral, Once a day (at bedtime), PRN  Xanax 0.5 mg oral tablet, 0.5 mg= 1 tab(s), Oral, At Bedtime, PRN  ZOLPIDEM TARTRATE 10 MG TABLET, 10 mg= 1 tab(s), Oral, qPM,? ?Not Taking per Prescriber  ZOLPIDEM TARTRATE 5 MG TABLET, 5 mg= 1 tab(s), Oral, qPM,? ?Not Taking per Prescriber  Allergies  No Known Allergies  No Known Medication Allergies  Social History  Alcohol - Denies Alcohol Use, 06/27/2015  Current, Wine, 1-2 times per year, 06/28/2017  Past, 1-2 times per year, 06/06/2016  Employment/School  Work/School description: Applying for SSI. Activity level: Moderate physical work. Highest education level: High school. Operates hazardous equipment: No., 04/03/2017  Exercise  Self assessment: Good condition., 02/16/2016  Home/Environment  Lives with Children. Living situation: Home/Independent. Home equipment: Wheelchair, hosp. bed,lift. Alcohol abuse in household: No. Substance abuse in household: No. Smoker in household: No. Injuries/Abuse/Neglect in household: No. Feels unsafe at home: No. Safe place to go: Yes. Agency(s)/Others notified: No. Family/Friends available for support: Yes. Concern for family members at home: No. Major illness in household: No. Financial concerns: No. TV/Computer concerns: No. Risks in environment: Pets/Animal exposure., 02/16/2016  Nutrition/Health  Regular, Caffeine intake amount: 1 soda/week. Wants to lose weight: No. Sleeping concerns: No. Feels highly  stressed: Yes., 04/03/2017  Other  Sexual  Substance Abuse - Denies Substance Abuse, 06/27/2015  Never, 09/29/2015  Tobacco - Denies Tobacco Use, 06/27/2015  Former smoker, 10/21/2015  Family History  Acute myocardial infarction.: Sister.  Cirrhosis of liver: Sister.  Kidney disease: Brother.  Lupus: Brother.  Immunizations  Vaccine Date Status   tetanus/diphtheria/pertussis, acel(Tdap) 02/16/2017 Given   influenza virus vaccine, inactivated 10/05/2016 Given

## 2022-05-01 NOTE — HISTORICAL [facility]
This is a historical note converted from [place]. Formatting and pictures may have been removed.  Please reference Eduardo for original formatting and attached multimedia. Chief Complaint  pt. reports that she has been having bilateral knee pain for 3 yrs. ?no injury. ?mri done back in 2017 at [facility]. ?right is worst than left. ?pt. is taking magnesium for the pain. ?xray done today. ?she has pain only when walking....sb  History of Present Illness  This is a patient that present today with bilateral?knee pain. Patient has had this knee pain for several years. This knee pain is moderate to severe. Patient states pain is worse with squats and lunges. Patient states pain is global. Patient has been treated previously with medication, bracing, injections, therapy.  Review of Systems  GENERAL: No fevers, chills, unexpected weight loss or gain  MUSCULOSKELETAL: Joint pain, extremity pain  Physical Exam  Vitals & Measurements  T:?35? ?C (Oral)? BP:?113/76?  HT:?158?cm? WT:?92.98?kg? BMI:?37.25?  General: Well-developed, well-nourished.  Neuro: Alert and oriented x 3.  Psych: Normal mood and affect.  CV: Palpable radial pulses.  Resp: Smooth and unlabored.  Skin: No evidence of focal lesions or trauma.  Hem/Imm/Lymph: No evidence of lymphangitis or adenopathy.  Gait: No trendelenburg gait.  DTR: 2+, no hypo or hyperreflexia.  Coordination and Balance: No tremors or abnormal station.  Right?knee Exam:  Varus deformity. Range of motion from 5-120 degrees. Negative patella grind and equal subluxation of knee cap medial and lateral < 1cm. Negative patella tendon tenderness. Negative Lachman and anterior drawer test. Negative posterior drawer test. Negative varus and valgus stress test. Positive medial joint line tenderness. Negative lateral joint line tenderness. 4/5 strength and normal skin appearance. Sensibility normal.  Assessment/Plan  1.?Osteoarthritis of right knee?M17.11  ?We discussed multiple options and I think the best  option for this patient be knee replacement surgery. ?She agrees. ?She is felt other forms of conservative treatment and wants to proceed with surgery.? We will obtain a?MRI scan of her knee to send off for?a custom knee replacement. ?I will see her back in 2 3 weeks for preop visit.  Orders:  XR Knee Left 4 or More Views, Routine, 19 10:09:00 CDT, Pain, None, Ambulatory, Rad Type, Left knee pain, Not Scheduled, 19 10:09:00 CDT  XR Knee Right 4 or More Views, Routine, 19 10:09:00 CDT, Pain, None, Ambulatory, Rad Type, Right knee pain, Not Scheduled, 19 10:09:00 CDT   Problem List/Past Medical History  Ongoing  Anxiety  Arthritis  Back pain  Chronic neck pain  Degenerative disc disease, lumbar  Depression  Left knee pain  Obesity  Onychomycosis  Rotator cuff tear  Visit for screening mammogram  Well adult exam  Historical  No qualifying data  Procedure/Surgical History  C SEC X2   delivery   section  Dilation and curettage   Medications  acetaminophen-hydrocodone 325 mg-10 mg oral tablet, 1 tab(s), Oral, q6hr  BUPROPION HCL  MG TABLET, 100 mg= 1 tab(s), Oral, qAM,? ?Not taking  cyclobenzaprine 10 mg oral tablet, 10 mg= 1 tab(s), Oral, TID  DESVENLAFAXINE SUC ER 50 MG TB, 50 mg= 1 tab(s), Oral, Daily,? ?Not taking  DULOXETINE HCL DR 30 MG CAP, 30 mg= 1 cap(s), Oral, Daily  DULOXETINE HCL DR 60 MG CAP, 60 mg= 1 cap(s), Oral, Daily  Feosol 200 mg (65 mg elemental iron) oral tablet  gabapentin 300 mg oral capsule, See Instructions  indomethacin 25 mg oral capsule, 25 mg= 1 cap(s), Oral, TID, PRN,? ?Not taking  Iron 100 Plus oral tablet, 1 tab(s), Oral, Daily  METHYLPREDNISOLONE 4 MG DOSEPK  NABUMETONE 750 MG TABLET, 750 mg= 1 tab(s), Oral, BID  NAPROXEN 500 MG TABLET, 500 mg= 1 tab(s), Oral, BID  PAROXETINE 10MG TABLETS, 10 mg= 1 tab(s), Oral, qPM  Senokot 8.6 mg oral tablet, 17.2 mg= 2 tab(s), Oral, Once a day (at bedtime), PRN  Xanax 0.5 mg oral tablet, 0.5 mg= 1 tab(s),  Oral, At Bedtime, PRN,? ?Not taking  ZOLPIDEM TARTRATE 10 MG TABLET, 10 mg= 1 tab(s), Oral, qPM,? ?Not Taking per Prescriber  Allergies  No Known Allergies  No Known Medication Allergies  Social History  Alcohol - Denies Alcohol Use, 06/27/2015  Current, Wine, 1-2 times per year, 06/28/2017  Past, 1-2 times per year, 06/06/2016  Employment/School  Work/School description: Applying for SSI. Activity level: Moderate physical work. Highest education level: High school. Operates hazardous equipment: No., 04/03/2017  Exercise  Self assessment: Good condition., 02/16/2016  Home/Environment  Lives with Children. Living situation: Home/Independent. Home equipment: Wheelchair, hosp. bed,lift. Alcohol abuse in household: No. Substance abuse in household: No. Smoker in household: No. Injuries/Abuse/Neglect in household: No. Feels unsafe at home: No. Safe place to go: Yes. Agency(s)/Others notified: No. Family/Friends available for support: Yes. Concern for family members at home: No. Major illness in household: No. Financial concerns: No. TV/Computer concerns: No. Risks in environment: Pets/Animal exposure., 02/16/2016  Nutrition/Health  Regular, Caffeine intake amount: 1 soda/week. Wants to lose weight: No. Sleeping concerns: No. Feels highly stressed: Yes., 04/03/2017  Other  Sexual  Substance Use - Denies Substance Abuse, 06/27/2015  Never, 09/29/2015  Tobacco - Denies Tobacco Use, 06/27/2015  Former smoker, quit more than 30 days ago, N/A, 07/16/2019  Family History  Acute myocardial infarction.: Sister.  Cirrhosis of liver: Sister.  Kidney disease: Brother.  Lupus: Brother.  Immunizations  Vaccine Date Status   tetanus/diphtheria/pertussis, acel(Tdap) 02/16/2017 Given   influenza virus vaccine, inactivated 10/05/2016 Given   Health Maintenance  Health Maintenance  ???Pending?(in the next year)  ??? ??OverDue  ??? ? ? ?Diabetes Screening due??and every?  ??? ? ? ?Alcohol Misuse Screening due??01/01/19??and every  1??year(s)  ??? ??Due?  ??? ? ? ?Blood Pressure Screening due??06/08/19??and every 1??year(s)  ??? ? ? ?Colorectal Screening due??06/11/19??and every 1??year(s)  ??? ? ? ?ADL Screening due??07/16/19??and every 1??year(s)  ??? ? ? ?Aspirin Therapy for CVD Prevention due??07/16/19??and every 1??year(s)  ??? ? ? ?Cervical Cancer Screening due??07/16/19??and every?  ??? ??Due In Future?  ??? ? ? ?Obesity Screening not due until??01/01/20??and every 1??year(s)  ??? ? ? ?Body Mass Index Check not due until??02/16/20??and every 1??year(s)  ??? ? ? ?Breast Cancer Screening not due until??04/10/20??and every 2??year(s)  ???Satisfied?(in the past 1 year)  ??? ??Satisfied?  ??? ? ? ?Blood Pressure Screening on??07/16/19.??Satisfied by Hamida Astudillo.  ??? ? ? ?Body Mass Index Check on??07/16/19.??Satisfied by Hamida Astudillo.  ??? ? ? ?Obesity Screening on??07/16/19.??Satisfied by Hamida Astudillo.  ?  Diagnostic Results  4 views of knee: X-rays demonstrate views of the knee joint space narrowing and degenerative changes with a varus deformity. There is also subchondral sclerosis and osteophyte formation.

## 2022-05-01 NOTE — HISTORICAL [facility] C[facility]
This is a historical note converted from [place]. Formatting and pictures may have been removed.  Please reference [place] for original formatting and attached multimedia. Admit and Discharge Dates  Admit Date: 09/04/2019  Discharge Date: 09/05/2019  Physicians  Attending Physician - Jeremy QUEVEDO, Gold BROWN  Admitting Physician - Jeremy QUEVEDO, Gold BROWN  Primary Care Physician - Rajeev QUEVEDO, Juan Pablo ISABEL  Discharge Diagnosis  1.?Status post total right knee replacement?Z96.651  Anxiety?F41.9  Back pain?M54.9  Chronic neck pain?M54.2  Depression?F32.9  IBS - Irritable bowel syndrome?K58.9  Surgical Procedures  09/04/2019 - [id number] - Total Knee Arthroplasty  Immunizations  No immunizations recorded for this visit.  Admission Information  This patient was admitted for a right total knee repair  Hospital Course  This patient was admitted for surgery. Patient tolerated the surgery well with no immediate postoperative complications. Patient progressed well with physical therapy. Patients pain was well controlled. Patient tolerated a diet. Patient is now stable and ready for discharge home. ?This patient was given discharge instructions and postoperative protocols.  Objective  Physical Exam  Incisions clean, dry, and intact. No signs of infection. Neurovascular intact distally. No calf tenderness.  Patient Discharge Condition  Stable  Discharge Disposition  Home with home health   Discharge Medication Reconciliation  Prescribed  acetaminophen-HYDROcodone (acetaminophen-hydrocodone 325 mg-10 mg oral tablet)?1 tab(s), Oral, q4hr, PRN as needed for pain  aspirin (aspirin 81 mg oral Delayed Release (EC) tablet)?81 mg, Oral, BID  docusate (Colace 100 mg oral capsule)?200 mg, Oral, Daily  ketorolac (Toradol 10 mg oral tablet)?10 mg, Oral, q8hr  Continue  DULoxetine (DULOXETINE HCL DR 30 MG CAP)?30 mg, Oral, Daily  DULoxetine (DULOXETINE HCL DR 60 MG CAP)?60 mg, Oral, Daily  alPRAzolam (Xanax 0.5 mg oral tablet)?0.5 mg, Oral, At Bedtime, PRN for  anxiety  buPROPion (BUPROPION HCL  MG TABLET)?100 mg, Oral, qAM  cyclobenzaprine (cyclobenzaprine 10 mg oral tablet)?10 mg, Oral, TID  desvenlafaxine (DESVENLAFAXINE SUC ER 50 MG TB)?50 mg, Oral, Daily  ferrous sulfate (Feosol 200 mg (65 mg elemental iron) oral tablet)?1 tab, Oral, Daily  gabapentin (gabapentin 300 mg oral capsule)?See Instructions  multivitamin with iron (Iron 100 Plus oral tablet)?1 tab(s), Oral, Daily  senna (Senokot 8.6 mg oral tablet)?17.2 mg, Oral, Once a day (at bedtime), PRN as needed for constipation  traZODone (traZODONE 50 mg oral tablet ( Desyrel ))?50 mg, Oral, qPM  Discontinue  PARoxetine (PAROXETINE 10MG TABLETS)?10 mg, Oral, qPM  acetaminophen-HYDROcodone (acetaminophen-hydrocodone 325 mg-10 mg oral tablet)?1 tab(s), Oral, q6hr  indomethacin (indomethacin 25 mg oral capsule)?25 mg, Oral, TID, PRN for pain, Notes for Patient: HOLD FOR 2 WEEKS  methylPREDNISolone (METHYLPREDNISOLONE 4 MG DOSEPK)  nabumetone (NABUMETONE 750 MG TABLET)?750 mg, Oral, BID  naproxen (NAPROXEN 500 MG TABLET)?500 mg, Oral, BID, Notes for Patient: HOLD FOR 2 WEEKS  zolpidem (ZOLPIDEM TARTRATE 10 MG TABLET)?10 mg, Oral, qPM  Education and Orders Provided  Irritable Bowel Syndrome, Adult  Chronic Pain, Adult  ==ALL== Fall Prevention in the Home, Easy-to-Read (TLBOOTHE)  ==ALL== ORTHO --- Medications (NEXT DOSE DUE) 8/1/19 (TLBOOTHE)  ==ROMAN== Total/Partial Knee Replacement on Aspirin 81mg -Percocet 5/Norco 7.5 (TLBOOTHE)  Discharge Wound Care - 09/05/19 13:25:00 CDT, 3-4x/Wk for 14 day(s), Stop date 09/19/19 13:24:00 CDT, Change dressing every other day or as needed for soiling. DO NOT WET WOUND. No ointments, creams, lotions or antiseptics to the incision. Do NOT touch incision.Coverlets...?  Discharge - 09/05/19 13:25:00 CDT, Dis/Trn Home Health, After PTGive all scheduled vaccinations prior to discharge. Multivitamin with iron for 1 month. Blood clot prevention:Baby Aspirin 81mg twice a day for 6 weeks.  Start tonight.Wear FABRICE hose for 2-6 weeks or until the swelling decreases - may take off at night.Increase walking distance each day.Constipation prevention: Stool softeners while on pain meds.Use over the counter LAXATIVES as needed for constipation.Drink plenty of water.Increase Fiber in diet.Pneumonia Prevention:Walk around every 2 hours and continue breathing exercises every 2 hours. Stay out of bed as much as possible.?  Discharge Activity - Activity as Tolerated, Full Weight Bearing with walker. NO heavy lifting, pulling, pushing, straining. Take it easy! Exercises per PT and outpatient physical therapy 3 times a week for 4 weeks. Elevate affected extremity ABOVE THE LEVEL OF THE HEART. SHIREEN Ice machine as needed for swelling.?  Discharge Diet - Regular, As prior to surgery?  Follow up  Gold Luna on 09/24/2019  [facility]  ????[facility]- FOR 2 WEEKS; THEN TRANSITION TO OUTPATIENT THERAPY. CALL IF YOU HAVE ANY QUESTIONS OR CONCERNS [phone]  [facility]  ????THIS IS THE OUTPATIENT THERAPY FACILITY- TO START IN 2 WEEKS. THEY WILL CALL YOU WITH APPOINTMENT DATE & TIME. CONTACT # [phone].

## 2022-05-01 NOTE — HISTORICAL [facility] CERN[facility]
This is a historical note converted from Eduardo. Formatting and pictures may have been removed.  Please reference Eduardo for original formatting and attached multimedia. Procedure Name  Date/Time:6/8/2018 09:30:43  Procedure:??Left?Knee Injection  Indications:??Diagnostic and Therapeutic Indication - decrease pain, increase range of motion and improve quality of life  RISKS: Possible complications with injection include?bleeding, infection (0.01%), tendon rupture, steroid flare, fat pad or soft tissue atrophy, skin depigmentation, and vasovagal response. ?(Steroid flair treatment is rest, ice, NSAIDs and resolves in 24-36 hours.)  Consent:???Procedure, risks, benefits, & alternatives explained to patient, who voiced understanding and agreed to proceed with procedure. ?Consent signed and?scanned into the medical record. No absolute contraindications (cellulitis overlying joint, infection, lack of informed consent, allergy to injection mediation, kevin protein or egg allergy for sodium hyaluronate, or history of steroid flare) or relative contraindications (brittle or out of control DM HgA1c > 10, coagulopathy INR > 3.5, previous joint replacement, or history of AVN).  Description:?Time out performed. The patient was prepped?using Chlorhexidine scrub after the appropriate?identification of anatomic landmarks.? Sterile needle used (size # 21 gauge, length 1.5 inch)?Topical anesthetic of ethyl chloride was used.? ?5 mL of 1% lidocaine plain with 40 mg of Kenalog injected.  Complications:?None?  EBL:??None  Post Procedure:?Patient reports improvement in pain and ROM. Patient alert, moving all extremities. ?Good peripheral pulses, no signs of vascular compromise, range of motion intact. ?Patient tolerated procedure well. ?Aftercare instructions were given to patient at time of discharge.??Relative rest for 3 days - avoiding excessive activity. ?Pendulum stretching exercises followed by stretching exercises daily?starting  on day 4.? Place ice on area for 15 minutes every 4 to 6 hours.??Tylenol 1000mg twice a day or ibuprofen 600 mg three times per day for next 3-4 days if not on medication already. ?Protect the area for the next 1-8 hours if anesthetic was used. ?Avoid excessive activity for next 3 to 4 weeks.?ER precautions for fever, severe joint pain, or allergic reaction or other new symptoms related to joint injection.

## 2022-06-09 ENCOUNTER — LAB VISIT (OUTPATIENT)
Dept: LAB | Facility: HOSPITAL | Age: 57
End: 2022-06-09
Attending: FAMILY MEDICINE
Payer: MEDICARE

## 2022-06-09 DIAGNOSIS — Z00.00 WELLNESS EXAMINATION: Primary | ICD-10-CM

## 2022-06-09 LAB
ALBUMIN SERPL-MCNC: 3.9 GM/DL (ref 3.5–5)
ALBUMIN/GLOB SERPL: 1 RATIO (ref 1.1–2)
ALP SERPL-CCNC: 86 UNIT/L (ref 40–150)
ALT SERPL-CCNC: 21 UNIT/L (ref 0–55)
AST SERPL-CCNC: 16 UNIT/L (ref 5–34)
BASOPHILS # BLD AUTO: 0.02 X10(3)/MCL (ref 0–0.2)
BASOPHILS NFR BLD AUTO: 0.3 %
BILIRUBIN DIRECT+TOT PNL SERPL-MCNC: 0.4 MG/DL
BUN SERPL-MCNC: 10.7 MG/DL (ref 9.8–20.1)
CALCIUM SERPL-MCNC: 9.9 MG/DL (ref 8.4–10.2)
CHLORIDE SERPL-SCNC: 106 MMOL/L (ref 98–107)
CHOLEST SERPL-MCNC: 255 MG/DL
CHOLEST/HDLC SERPL: 5 {RATIO} (ref 0–5)
CO2 SERPL-SCNC: 26 MMOL/L (ref 22–29)
CREAT SERPL-MCNC: 0.75 MG/DL (ref 0.55–1.02)
DEPRECATED CALCIDIOL+CALCIFEROL SERPL-MC: 86.9 NG/ML (ref 30–80)
EOSINOPHIL # BLD AUTO: 0.19 X10(3)/MCL (ref 0–0.9)
EOSINOPHIL NFR BLD AUTO: 2.6 %
ERYTHROCYTE [DISTWIDTH] IN BLOOD BY AUTOMATED COUNT: 13.1 % (ref 11.5–17)
GLOBULIN SER-MCNC: 3.8 GM/DL (ref 2.4–3.5)
GLUCOSE SERPL-MCNC: 98 MG/DL (ref 74–100)
HCT VFR BLD AUTO: 42.6 % (ref 37–47)
HDLC SERPL-MCNC: 54 MG/DL (ref 35–60)
HGB BLD-MCNC: 13.7 GM/DL (ref 12–16)
IMM GRANULOCYTES # BLD AUTO: 0.03 X10(3)/MCL (ref 0–0.02)
IMM GRANULOCYTES NFR BLD AUTO: 0.4 % (ref 0–0.43)
LDLC SERPL CALC-MCNC: 166 MG/DL (ref 50–140)
LYMPHOCYTES # BLD AUTO: 3.16 X10(3)/MCL (ref 0.6–4.6)
LYMPHOCYTES NFR BLD AUTO: 42.8 %
MCH RBC QN AUTO: 29.8 PG (ref 27–31)
MCHC RBC AUTO-ENTMCNC: 32.2 MG/DL (ref 33–36)
MCV RBC AUTO: 92.8 FL (ref 80–94)
MONOCYTES # BLD AUTO: 0.5 X10(3)/MCL (ref 0.1–1.3)
MONOCYTES NFR BLD AUTO: 6.8 %
NEUTROPHILS # BLD AUTO: 3.5 X10(3)/MCL (ref 2.1–9.2)
NEUTROPHILS NFR BLD AUTO: 47.1 %
NRBC BLD AUTO-RTO: 0 %
PLATELET # BLD AUTO: 322 X10(3)/MCL (ref 130–400)
PMV BLD AUTO: 10 FL (ref 9.4–12.4)
POTASSIUM SERPL-SCNC: 4.2 MMOL/L (ref 3.5–5.1)
PROT SERPL-MCNC: 7.7 GM/DL (ref 6.4–8.3)
RBC # BLD AUTO: 4.59 X10(6)/MCL (ref 4.2–5.4)
SODIUM SERPL-SCNC: 143 MMOL/L (ref 136–145)
T3RU NFR SERPL: 28.55 % (ref 31–39)
T4 SERPL-MCNC: 6.95 UG/DL (ref 4.87–11.72)
TRIGL SERPL-MCNC: 176 MG/DL (ref 37–140)
TSH SERPL-ACNC: 2.32 UIU/ML (ref 0.35–4.94)
VLDLC SERPL CALC-MCNC: 35 MG/DL
WBC # SPEC AUTO: 7.4 X10(3)/MCL (ref 4.5–11.5)

## 2022-06-09 PROCEDURE — 80053 COMPREHEN METABOLIC PANEL: CPT

## 2022-06-09 PROCEDURE — 80061 LIPID PANEL: CPT

## 2022-06-09 PROCEDURE — 85025 COMPLETE CBC W/AUTO DIFF WBC: CPT

## 2022-06-09 PROCEDURE — 36415 COLL VENOUS BLD VENIPUNCTURE: CPT

## 2022-06-09 PROCEDURE — 84436 ASSAY OF TOTAL THYROXINE: CPT

## 2022-06-09 PROCEDURE — 84479 ASSAY OF THYROID (T3 OR T4): CPT

## 2022-06-09 PROCEDURE — 84443 ASSAY THYROID STIM HORMONE: CPT

## 2022-06-09 PROCEDURE — 82306 VITAMIN D 25 HYDROXY: CPT

## 2022-07-28 DIAGNOSIS — M47.816 FACET HYPERTROPHY OF LUMBAR REGION: ICD-10-CM

## 2022-07-28 DIAGNOSIS — G89.29 CHRONIC BACK PAIN GREATER THAN 3 MONTHS DURATION: ICD-10-CM

## 2022-07-28 DIAGNOSIS — M51.36 DDD (DEGENERATIVE DISC DISEASE), LUMBAR: Primary | ICD-10-CM

## 2022-07-28 DIAGNOSIS — M48.061 SPINAL STENOSIS OF LUMBAR REGION, UNSPECIFIED WHETHER NEUROGENIC CLAUDICATION PRESENT: ICD-10-CM

## 2022-07-28 DIAGNOSIS — M54.9 CHRONIC BACK PAIN GREATER THAN 3 MONTHS DURATION: ICD-10-CM

## 2022-07-28 NOTE — TELEPHONE ENCOUNTER
Pt called requesting refill. Last visit 04/26/22.    Assessment/Plan  1. Degenerative disc disease, lumbar M51.36  Ordered:  Clinic Follow-up PRN, 04/26/22 9:19:00 CDT, Future Order, Orthopaedics  Office/Outpatient Visit Level 3 Established [id number] PC, Degenerative disc disease, lumbar  Facet hypertrophy of lumbar region  Lumbar spinal stenosis  Chronic back pain greater than 3 months duration, [facility], 04/26/22 9:20:00 CDT     2. Facet hypertrophy of lumbar region M47.816  Ordered:  Clinic Follow-up PRN, 04/26/22 9:19:00 CDT, Future Order, Orthopaedics  Office/Outpatient Visit Level 3 Established [id number] PC, Degenerative disc disease, lumbar  Facet hypertrophy of lumbar region  Lumbar spinal stenosis  Chronic back pain greater than 3 months duration, [facility], 04/26/22 9:20:00 CDT     3. Lumbar spinal stenosis M48.061  Ordered:  Clinic Follow-up PRN, 04/26/22 9:19:00 CDT, Future Order, Orthopaedics  Office/Outpatient Visit Level 3 Established [id number] PC, Degenerative disc disease, lumbar  Facet hypertrophy of lumbar region  Lumbar spinal stenosis  Chronic back pain greater than 3 months duration, [facility], 04/26/22 9:20:00 CDT     4. Chronic back pain greater than 3 months duration M54.9  Ordered:  Clinic Follow-up PRN, 04/26/22 9:19:00 CDT, [facility]  Office/Outpatient Visit Level 3 Established [id number] PC, Degenerative disc disease, lumbar  Facet hypertrophy of lumbar region  Lumbar spinal stenosis  Chronic back pain greater than 3 months duration, [facility], 04/26/22 9:20:00 CDT     Discussed SCS and expectations for trial. Provided handout for her to review. She will call if she wishes to move forward with this option.

## 2022-07-29 RX ORDER — CHLORZOXAZONE 500 MG/1
500 TABLET ORAL 2 TIMES DAILY PRN
Qty: 60 TABLET | Refills: 0 | Status: SHIPPED | OUTPATIENT
Start: 2022-07-29

## 2023-03-10 ENCOUNTER — OFFICE VISIT (OUTPATIENT)
Dept: PAIN MEDICINE | Facility: CLINIC | Age: 58
End: 2023-03-10
Payer: MEDICARE

## 2023-03-10 VITALS
WEIGHT: 215 LBS | SYSTOLIC BLOOD PRESSURE: 102 MMHG | HEART RATE: 78 BPM | HEIGHT: 62 IN | BODY MASS INDEX: 39.56 KG/M2 | DIASTOLIC BLOOD PRESSURE: 64 MMHG | TEMPERATURE: 98 F

## 2023-03-10 DIAGNOSIS — M47.812 CERVICAL SPONDYLOSIS: ICD-10-CM

## 2023-03-10 DIAGNOSIS — M50.30 DDD (DEGENERATIVE DISC DISEASE), CERVICAL: ICD-10-CM

## 2023-03-10 DIAGNOSIS — G89.29 CHRONIC NECK PAIN: Primary | ICD-10-CM

## 2023-03-10 DIAGNOSIS — G89.29 CHRONIC BACK PAIN GREATER THAN 3 MONTHS DURATION: ICD-10-CM

## 2023-03-10 DIAGNOSIS — M54.2 CHRONIC NECK PAIN: Primary | ICD-10-CM

## 2023-03-10 DIAGNOSIS — M54.9 CHRONIC BACK PAIN GREATER THAN 3 MONTHS DURATION: ICD-10-CM

## 2023-03-10 DIAGNOSIS — M51.36 LUMBAR DEGENERATIVE DISC DISEASE: ICD-10-CM

## 2023-03-10 DIAGNOSIS — M54.16 LUMBAR RADICULOPATHY, CHRONIC: ICD-10-CM

## 2023-03-10 PROBLEM — M51.369 LUMBAR DEGENERATIVE DISC DISEASE: Status: ACTIVE | Noted: 2023-03-10

## 2023-03-10 PROCEDURE — 99213 OFFICE O/P EST LOW 20 MIN: CPT | Mod: ,,, | Performed by: ANESTHESIOLOGY

## 2023-03-10 PROCEDURE — 99213 PR OFFICE/OUTPT VISIT, EST, LEVL III, 20-29 MIN: ICD-10-PCS | Mod: ,,, | Performed by: ANESTHESIOLOGY

## 2023-03-10 RX ORDER — PREGABALIN 75 MG/1
75 CAPSULE ORAL 2 TIMES DAILY
COMMUNITY
End: 2024-04-02

## 2023-03-10 RX ORDER — MELOXICAM 15 MG/1
15 TABLET ORAL DAILY
COMMUNITY
Start: 2022-12-29 | End: 2023-10-03 | Stop reason: ALTCHOICE

## 2023-03-10 RX ORDER — POTASSIUM CHLORIDE 750 MG/1
10 CAPSULE, EXTENDED RELEASE ORAL ONCE
COMMUNITY

## 2023-03-10 RX ORDER — HYDROCODONE BITARTRATE AND ACETAMINOPHEN 10; 325 MG/1; MG/1
1 TABLET ORAL EVERY 8 HOURS PRN
COMMUNITY
Start: 2023-03-07 | End: 2024-04-02

## 2023-03-10 RX ORDER — LEVOTHYROXINE SODIUM 25 UG/1
25 TABLET ORAL EVERY MORNING
COMMUNITY

## 2023-03-10 RX ORDER — ZOLPIDEM TARTRATE 10 MG/1
10 TABLET ORAL NIGHTLY
COMMUNITY
Start: 2023-02-22

## 2023-03-10 RX ORDER — LINACLOTIDE 290 UG/1
290 CAPSULE, GELATIN COATED ORAL EVERY MORNING
COMMUNITY
Start: 2023-02-27

## 2023-03-10 RX ORDER — LORATADINE 10 MG/1
10 TABLET ORAL DAILY
COMMUNITY
Start: 2023-02-06

## 2023-03-10 RX ORDER — TRAZODONE HYDROCHLORIDE 100 MG/1
100 TABLET ORAL NIGHTLY
COMMUNITY
Start: 2023-02-03

## 2023-03-10 RX ORDER — CYCLOBENZAPRINE HCL 10 MG
10 TABLET ORAL 3 TIMES DAILY
COMMUNITY
Start: 2023-02-03 | End: 2024-04-02

## 2023-03-10 RX ORDER — DEXTROMETHORPHAN HYDROBROMIDE, GUAIFENESIN 5; 100 MG/5ML; MG/5ML
650 LIQUID ORAL EVERY 8 HOURS PRN
COMMUNITY

## 2023-03-10 RX ORDER — DULOXETIN HYDROCHLORIDE 60 MG/1
60 CAPSULE, DELAYED RELEASE ORAL
Status: ON HOLD | COMMUNITY
Start: 2023-02-06 | End: 2023-08-07

## 2023-03-10 NOTE — PROGRESS NOTES
Erin Mock MD        PATIENT NAME: Adrianna Childers  : 1965  DATE: 3/10/23  MRN: [id number]      Billing Provider: Erin Mock MD  Level of Service:   Patient PCP Information       Provider PCP Type    Juan Pablo Boo MD General            Reason for Visit / Chief Complaint: Neck Pain (Follow up for cervical pain, pt states pain has increased, woulike like to schedule injection, norco for relief, pain level 4/10)       Update PCP  Update Chief Complaint         History of Present Illness / Problem Focused Workflow     Adrianna Childers presents to the clinic with Neck Pain (Follow up for cervical pain, pt states pain has increased, woulike like to schedule injection, norco for relief, pain level 4/10)     This is a 57-year-old female who returns to clinic today for follow up of her chronic neck pain and low back pain.  She states that her neck pain and particular has been getting worse recently.  It is radiating to the bilateral shoulders.  She is interested in having an injection done for this.  She also states that her low back pain has been getting gradually worse.  We have discussed a spinal cord stimulator trial in the past and she is interested in pursuing this.  She has undergone injections in her lumbar spine that have not provided long-term relief.  She currently rates her pain as 4/10 on the NRS.      Neck Pain       Review of Systems     Review of Systems   Musculoskeletal:  Positive for back pain, neck pain and neck stiffness.   Psychiatric/Behavioral:  Positive for sleep disturbance.    All other systems reviewed and are negative.     Medical / Social / Family History     Past Medical History:   Diagnosis Date    Anxiety     Depression     Thyroid disease        Past Surgical History:   Procedure Laterality Date    JOINT REPLACEMENT      TUBAL LIGATION         Social History  Ms. Childers  reports that she has never smoked. She has never used smokeless tobacco. She reports current  alcohol use of about 2.0 standard drinks per week. She reports that she does not use drugs.    Family History  Ms.'s Alvarado family history is not on file.    Medications and Allergies     Medications  Outpatient Medications Marked as Taking for the 3/10/23 encounter (Office Visit) with Erin Mock MD   Medication Sig Dispense Refill    acetaminophen (TYLENOL) 650 MG TbSR Take 650 mg by mouth every 8 (eight) hours as needed.      aspirin 325 MG tablet Take 325 mg by mouth once daily.      chlorzoxazone (PARAFON FORTE) 500 mg Tab Take 1 tablet (500 mg total) by mouth 2 (two) times daily as needed (muscle spasms). 60 tablet 0    cyclobenzaprine (FLEXERIL) 10 MG tablet Take 10 mg by mouth 2 (two) times daily.      DULoxetine (CYMBALTA) 60 MG capsule Take 60 mg by mouth.      HYDROcodone-acetaminophen (NORCO)  mg per tablet Take 1 tablet by mouth every 8 (eight) hours as needed.      levothyroxine (SYNTHROID) 25 MCG tablet Take 25 mcg by mouth every morning.      LINZESS 290 mcg Cap capsule Take 290 mcg by mouth every morning.      loratadine (CLARITIN) 10 mg tablet Take 10 mg by mouth.      meloxicam (MOBIC) 15 MG tablet Take 15 mg by mouth.      potassium bicarbonate (K-LYTE) disintegrating tablet Take 99 mEq by mouth 2 (two) times daily.      potassium chloride (MICRO-K) 10 MEQ CpSR Take 10 mEq by mouth once.      pregabalin (LYRICA) 75 MG capsule Take 75 mg by mouth 2 (two) times daily.      traZODone (DESYREL) 100 MG tablet Take 100 mg by mouth every evening.      zolpidem (AMBIEN) 10 mg Tab Take 10 mg by mouth every evening.         Allergies  Review of patient's allergies indicates:  No Known Allergies    Physical Examination     Vitals:    03/10/23 0900   BP: 102/64   Pulse: 78   Temp: 98.3 °F (36.8 °C)     Spine Musculoskeletal Exam    Gait    Gait is normal.    Inspection    Cervical Spine    Cervical spine inspection is normal.    Palpation    Cervical Spine    Right      Masses: none      Spasms:  none      Crepitus: none      Muscle tone: normal    Left      Masses: none      Spasms: none      Crepitus: none      Muscle tone: normal    Range of Motion    Cervical Spine        Cervical spine range of motion additional comments: Limited range of motion in the cervical spine secondary to pain    Strength    Cervical Spine    Cervical spine motor exam is normal.    Sensory    Cervical Spine    Cervical spine sensation is normal.    General      Constitutional: appears stated age, well-developed and well-nourished    Scleral icterus: no    Labored breathing: no    Psychiatric: normal mood and affect and no acute distress    Neurological: alert and oriented x3    Skin: intact    Lymphadenopathy: none     Assessment and Plan (including Health Maintenance)      Problem List  Smart Sets  Document Outside HM   :    Plan:   Chronic neck pain  -     MRI Cervical Spine Without Contrast; Future; Expected date: 03/10/2023    DDD (degenerative disc disease), cervical  -     MRI Cervical Spine Without Contrast; Future; Expected date: 03/10/2023    Cervical spondylosis  -     MRI Cervical Spine Without Contrast; Future; Expected date: 03/10/2023    Chronic back pain greater than 3 months duration  -     MRI Lumbar Spine Without Contrast; Future; Expected date: 03/10/2023    Lumbar degenerative disc disease  -     MRI Lumbar Spine Without Contrast; Future; Expected date: 03/10/2023    Lumbar radiculopathy, chronic  -     MRI Lumbar Spine Without Contrast; Future; Expected date: 03/10/2023       I am getting updated MRIs of the cervical spine and lumbar spine without contrast for further evaluation of her worsening symptoms.  She would like to have an injection done for her worsening neck pain.  I will call and let her know once I have reviewed the results to see about getting her scheduled for an injection.  We will further discuss spinal cord stimulator trial at her next follow up appointment.    Problem List Items Addressed  This Visit       Chronic neck pain - Primary    Relevant Orders    MRI Cervical Spine Without Contrast    DDD (degenerative disc disease), cervical    Relevant Orders    MRI Cervical Spine Without Contrast    Cervical spondylosis    Relevant Orders    MRI Cervical Spine Without Contrast    Chronic back pain greater than 3 months duration    Relevant Orders    MRI Lumbar Spine Without Contrast    Lumbar degenerative disc disease    Relevant Orders    MRI Lumbar Spine Without Contrast     Other Visit Diagnoses       Lumbar radiculopathy, chronic        Relevant Orders    MRI Lumbar Spine Without Contrast              No future appointments.     There are no Patient Instructions on file for this visit.  No follow-ups on file.     Signature:  Erin Mock MD      Date of encounter: 3/10/23

## 2023-03-29 ENCOUNTER — HOSPITAL ENCOUNTER (OUTPATIENT)
Dept: RADIOLOGY | Facility: HOSPITAL | Age: 58
Discharge: HOME | End: 2023-03-29
Attending: ANESTHESIOLOGY
Payer: MEDICARE

## 2023-03-29 DIAGNOSIS — M54.2 CHRONIC NECK PAIN: ICD-10-CM

## 2023-03-29 DIAGNOSIS — M47.812 CERVICAL SPONDYLOSIS: ICD-10-CM

## 2023-03-29 DIAGNOSIS — M54.16 LUMBAR RADICULOPATHY, CHRONIC: ICD-10-CM

## 2023-03-29 DIAGNOSIS — G89.29 CHRONIC BACK PAIN GREATER THAN 3 MONTHS DURATION: ICD-10-CM

## 2023-03-29 DIAGNOSIS — G89.29 CHRONIC NECK PAIN: ICD-10-CM

## 2023-03-29 DIAGNOSIS — M51.36 LUMBAR DEGENERATIVE DISC DISEASE: ICD-10-CM

## 2023-03-29 DIAGNOSIS — M54.9 CHRONIC BACK PAIN GREATER THAN 3 MONTHS DURATION: ICD-10-CM

## 2023-03-29 DIAGNOSIS — M50.30 DDD (DEGENERATIVE DISC DISEASE), CERVICAL: ICD-10-CM

## 2023-03-29 PROCEDURE — 72141 MRI NECK SPINE W/O DYE: CPT | Mod: TC

## 2023-03-29 PROCEDURE — 72148 MRI LUMBAR SPINE W/O DYE: CPT | Mod: TC

## 2023-04-06 ENCOUNTER — TELEPHONE (OUTPATIENT)
Dept: PAIN MEDICINE | Facility: CLINIC | Age: 58
End: 2023-04-06
Payer: MEDICARE

## 2023-04-20 ENCOUNTER — OFFICE VISIT (OUTPATIENT)
Dept: PAIN MEDICINE | Facility: CLINIC | Age: 58
End: 2023-04-20
Payer: MEDICARE

## 2023-04-20 DIAGNOSIS — M50.30 DDD (DEGENERATIVE DISC DISEASE), CERVICAL: Primary | ICD-10-CM

## 2023-04-20 DIAGNOSIS — M54.16 RADICULOPATHY, LUMBAR REGION: ICD-10-CM

## 2023-04-20 DIAGNOSIS — M54.2 CHRONIC NECK PAIN: ICD-10-CM

## 2023-04-20 DIAGNOSIS — G89.29 CHRONIC BACK PAIN GREATER THAN 3 MONTHS DURATION: ICD-10-CM

## 2023-04-20 DIAGNOSIS — G89.29 CHRONIC NECK PAIN: ICD-10-CM

## 2023-04-20 DIAGNOSIS — M54.9 CHRONIC BACK PAIN GREATER THAN 3 MONTHS DURATION: ICD-10-CM

## 2023-04-20 DIAGNOSIS — M47.812 CERVICAL SPONDYLOSIS: ICD-10-CM

## 2023-04-20 DIAGNOSIS — M51.36 LUMBAR DEGENERATIVE DISC DISEASE: ICD-10-CM

## 2023-04-20 PROCEDURE — 99213 PR OFFICE/OUTPT VISIT, EST, LEVL III, 20-29 MIN: ICD-10-PCS | Mod: ,,, | Performed by: ANESTHESIOLOGY

## 2023-04-20 PROCEDURE — 99213 OFFICE O/P EST LOW 20 MIN: CPT | Mod: ,,, | Performed by: ANESTHESIOLOGY

## 2023-04-20 NOTE — PROGRESS NOTES
Erin Mock MD        PATIENT NAME: Adrianna Childers  : 1965  DATE: 23  MRN: [id number]      Billing Provider: Erin Mock MD  Level of Service:   Patient PCP Information       Provider PCP Type    Juan Pablo Boo MD General            Reason for Visit / Chief Complaint: Back Pain (Patient is here to go over MRI results and low back pain. Rates pain level currently at a 3/10 but gets as high as 10. Patient is taking [place] from Dr. Ha with minimal relief. Also on Fexeril.)       Update PCP  Update Chief Complaint         History of Present Illness / Problem Focused Workflow     Adrianna Childers presents to the clinic with Back Pain (Patient is here to go over MRI results and low back pain. Rates pain level currently at a 3/10 but gets as high as 10. Patient is taking [place] from Dr. Ha with minimal relief. Also on Fexeril.)     This is a 57-year-old female who returns to clinic today for follow up of her chronic neck pain and low back pain.  She states that her neck pain and back pain have been getting worse recently.  It is radiating to the bilateral shoulders.  Her back pain was radiating into the buttocks bilaterally, but worse on the left.  She is interested in having an injection done for this.  We have discussed a spinal cord stimulator trial in the past and she is interested in pursuing this, if another injection does not significantly help.  She currently rates her pain as 3/10 on the NRS, but it gets up to 10/10 at worst.      Neck Pain     Back Pain      Review of Systems     Review of Systems   Musculoskeletal:  Positive for back pain, neck pain and neck stiffness.   Psychiatric/Behavioral:  Positive for sleep disturbance.    All other systems reviewed and are negative.     Medical / Social / Family History     Past Medical History:   Diagnosis Date    Anxiety     Depression     Thyroid disease        Past Surgical History:   Procedure Laterality Date    JOINT REPLACEMENT       TUBAL LIGATION         Social History  Ms. Childers  reports that she has quit smoking. Her smoking use included cigarettes. She has never used smokeless tobacco. She reports current alcohol use of about 2.0 standard drinks per week. She reports that she does not use drugs.    Family History  Ms.'s Childers family history is not on file.    Medications and Allergies     Medications  Outpatient Medications Marked as Taking for the 4/20/23 encounter (Office Visit) with Erin Mock MD   Medication Sig Dispense Refill    acetaminophen (TYLENOL) 650 MG TbSR Take 650 mg by mouth every 8 (eight) hours as needed.      aspirin 325 MG tablet Take 325 mg by mouth once daily.      chlorzoxazone (PARAFON FORTE) 500 mg Tab Take 1 tablet (500 mg total) by mouth 2 (two) times daily as needed (muscle spasms). 60 tablet 0    cyclobenzaprine (FLEXERIL) 10 MG tablet Take 10 mg by mouth 2 (two) times daily.      DULoxetine (CYMBALTA) 60 MG capsule Take 60 mg by mouth.      HYDROcodone-acetaminophen (NORCO)  mg per tablet Take 1 tablet by mouth every 8 (eight) hours as needed.      levothyroxine (SYNTHROID) 25 MCG tablet Take 25 mcg by mouth every morning.      LINZESS 290 mcg Cap capsule Take 290 mcg by mouth every morning.      loratadine (CLARITIN) 10 mg tablet Take 10 mg by mouth.      meloxicam (MOBIC) 15 MG tablet Take 15 mg by mouth.      potassium bicarbonate (K-LYTE) disintegrating tablet Take 99 mEq by mouth 2 (two) times daily.      potassium chloride (MICRO-K) 10 MEQ CpSR Take 10 mEq by mouth once.      pregabalin (LYRICA) 75 MG capsule Take 75 mg by mouth 2 (two) times daily.      traZODone (DESYREL) 100 MG tablet Take 100 mg by mouth every evening.      zolpidem (AMBIEN) 10 mg Tab Take 10 mg by mouth every evening.         Allergies  Review of patient's allergies indicates:  No Known Allergies    Physical Examination     There were no vitals filed for this visit.    Spine Musculoskeletal Exam    Gait    Gait is  normal.    Inspection    Cervical Spine    Cervical spine inspection is normal.    Palpation    Cervical Spine    Right      Masses: none      Spasms: none      Crepitus: none      Muscle tone: normal    Left      Masses: none      Spasms: none      Crepitus: none      Muscle tone: normal    Range of Motion    Cervical Spine        Cervical spine range of motion additional comments: Limited range of motion in the cervical spine secondary to pain    Strength    Cervical Spine    Cervical spine motor exam is normal.    Sensory    Cervical Spine    Cervical spine sensation is normal.    General      Constitutional: appears stated age, well-developed and well-nourished    Scleral icterus: no    Labored breathing: no    Psychiatric: normal mood and affect and no acute distress    Neurological: alert and oriented x3    Skin: intact    Lymphadenopathy: none     Assessment and Plan (including Health Maintenance)      Problem List  Smart Sets  Document Outside HM   :    Plan:   DDD (degenerative disc disease), cervical    Lumbar degenerative disc disease    Cervical spondylosis    Chronic back pain greater than 3 months duration    Chronic neck pain       Discuss the results of her MRI of the lumbar spine today and she would like to schedule another injection for this.  She is being scheduled for left L3 and right L4 transforaminal epidural steroid injections today.  The plan was discussed with the patient and she wishes to proceed.    Problem List Items Addressed This Visit       Chronic neck pain    DDD (degenerative disc disease), cervical - Primary    Cervical spondylosis    Chronic back pain greater than 3 months duration    Lumbar degenerative disc disease         No future appointments.     There are no Patient Instructions on file for this visit.  No follow-ups on file.     Signature:  Erin Mock MD      Date of encounter: 4/20/23

## 2023-05-24 RX ORDER — ACETAMINOPHEN 500 MG
5000 TABLET ORAL NIGHTLY
COMMUNITY

## 2023-05-24 RX ORDER — FLUOXETINE 10 MG/1
20 CAPSULE ORAL DAILY
Status: ON HOLD | COMMUNITY
End: 2023-08-07

## 2023-05-24 RX ORDER — MULTIVITAMIN
1 TABLET ORAL DAILY
COMMUNITY

## 2023-05-24 RX ORDER — SENNOSIDES 8.6 MG/1
17.2 TABLET ORAL DAILY
COMMUNITY

## 2023-05-24 RX ORDER — DIPHENHYDRAMINE HCL 25 MG
25 CAPSULE ORAL NIGHTLY
COMMUNITY

## 2023-05-24 RX ORDER — LEVOCETIRIZINE DIHYDROCHLORIDE 5 MG/1
5 TABLET, FILM COATED ORAL DAILY
COMMUNITY

## 2023-06-01 ENCOUNTER — ANESTHESIA EVENT (OUTPATIENT)
Dept: SURGERY | Facility: HOSPITAL | Age: 58
End: 2023-06-01
Payer: MEDICARE

## 2023-06-07 ENCOUNTER — ANESTHESIA (OUTPATIENT)
Dept: SURGERY | Facility: HOSPITAL | Age: 58
End: 2023-06-07
Payer: MEDICARE

## 2023-06-07 ENCOUNTER — HOSPITAL ENCOUNTER (OUTPATIENT)
Facility: HOSPITAL | Age: 58
Discharge: HOME | End: 2023-06-07
Attending: ANESTHESIOLOGY | Admitting: ANESTHESIOLOGY
Payer: MEDICARE

## 2023-06-07 DIAGNOSIS — G89.29 CHRONIC BACK PAIN GREATER THAN 3 MONTHS DURATION: ICD-10-CM

## 2023-06-07 DIAGNOSIS — M54.9 CHRONIC BACK PAIN GREATER THAN 3 MONTHS DURATION: ICD-10-CM

## 2023-06-07 PROCEDURE — 37000008 HC ANESTHESIA 1ST 15 MINUTES: Performed by: ANESTHESIOLOGY

## 2023-06-07 PROCEDURE — 25000003 PHARM REV CODE 250: Performed by: ANESTHESIOLOGY

## 2023-06-07 PROCEDURE — 63600175 PHARM REV CODE 636 W HCPCS: Performed by: NURSE ANESTHETIST, CERTIFIED REGISTERED

## 2023-06-07 PROCEDURE — D9220A PRA ANESTHESIA: ICD-10-PCS | Mod: ANES,,, | Performed by: ANESTHESIOLOGY

## 2023-06-07 PROCEDURE — 25000003 PHARM REV CODE 250: Performed by: NURSE ANESTHETIST, CERTIFIED REGISTERED

## 2023-06-07 PROCEDURE — D9220A PRA ANESTHESIA: ICD-10-PCS | Mod: CRNA,,, | Performed by: NURSE ANESTHETIST, CERTIFIED REGISTERED

## 2023-06-07 PROCEDURE — 64483 NJX AA&/STRD TFRM EPI L/S 1: CPT | Mod: LT | Performed by: ANESTHESIOLOGY

## 2023-06-07 PROCEDURE — 63600175 PHARM REV CODE 636 W HCPCS: Performed by: ANESTHESIOLOGY

## 2023-06-07 PROCEDURE — 64483 PR EPIDURAL INJ, ANES/STEROID, TRANSFORAMINAL, LUMB/SACR, SNGL LEVL: ICD-10-PCS | Mod: 50,,, | Performed by: ANESTHESIOLOGY

## 2023-06-07 PROCEDURE — D9220A PRA ANESTHESIA: Mod: ANES,,, | Performed by: ANESTHESIOLOGY

## 2023-06-07 PROCEDURE — D9220A PRA ANESTHESIA: Mod: CRNA,,, | Performed by: NURSE ANESTHETIST, CERTIFIED REGISTERED

## 2023-06-07 PROCEDURE — 64483 NJX AA&/STRD TFRM EPI L/S 1: CPT | Mod: 50,,, | Performed by: ANESTHESIOLOGY

## 2023-06-07 RX ORDER — PROPOFOL 10 MG/ML
VIAL (ML) INTRAVENOUS
Status: DISCONTINUED | OUTPATIENT
Start: 2023-06-07 | End: 2023-06-07

## 2023-06-07 RX ORDER — LIDOCAINE HYDROCHLORIDE 20 MG/ML
INJECTION INTRAVENOUS
Status: DISCONTINUED | OUTPATIENT
Start: 2023-06-07 | End: 2023-06-07

## 2023-06-07 RX ORDER — DEXAMETHASONE SODIUM PHOSPHATE 10 MG/ML
INJECTION INTRAMUSCULAR; INTRAVENOUS
Status: DISCONTINUED | OUTPATIENT
Start: 2023-06-07 | End: 2023-06-07 | Stop reason: HOSPADM

## 2023-06-07 RX ORDER — BUPIVACAINE HYDROCHLORIDE 2.5 MG/ML
INJECTION, SOLUTION EPIDURAL; INFILTRATION; INTRACAUDAL
Status: DISCONTINUED
Start: 2023-06-07 | End: 2023-06-07 | Stop reason: HOSPADM

## 2023-06-07 RX ORDER — SODIUM CHLORIDE, SODIUM GLUCONATE, SODIUM ACETATE, POTASSIUM CHLORIDE AND MAGNESIUM CHLORIDE 30; 37; 368; 526; 502 MG/100ML; MG/100ML; MG/100ML; MG/100ML; MG/100ML
INJECTION, SOLUTION INTRAVENOUS CONTINUOUS
Status: DISCONTINUED | OUTPATIENT
Start: 2023-06-07 | End: 2023-06-07 | Stop reason: HOSPADM

## 2023-06-07 RX ORDER — LIDOCAINE HYDROCHLORIDE 10 MG/ML
INJECTION, SOLUTION EPIDURAL; INFILTRATION; INTRACAUDAL; PERINEURAL
Status: DISCONTINUED
Start: 2023-06-07 | End: 2023-06-07 | Stop reason: HOSPADM

## 2023-06-07 RX ORDER — BUPIVACAINE HYDROCHLORIDE 2.5 MG/ML
INJECTION, SOLUTION EPIDURAL; INFILTRATION; INTRACAUDAL
Status: DISCONTINUED | OUTPATIENT
Start: 2023-06-07 | End: 2023-06-07 | Stop reason: HOSPADM

## 2023-06-07 RX ORDER — LIDOCAINE HYDROCHLORIDE 10 MG/ML
1 INJECTION, SOLUTION EPIDURAL; INFILTRATION; INTRACAUDAL; PERINEURAL ONCE
Status: DISCONTINUED | OUTPATIENT
Start: 2023-06-07 | End: 2023-06-07 | Stop reason: HOSPADM

## 2023-06-07 RX ORDER — LIDOCAINE HYDROCHLORIDE 10 MG/ML
INJECTION, SOLUTION EPIDURAL; INFILTRATION; INTRACAUDAL; PERINEURAL
Status: DISCONTINUED | OUTPATIENT
Start: 2023-06-07 | End: 2023-06-07 | Stop reason: HOSPADM

## 2023-06-07 RX ORDER — DEXAMETHASONE SODIUM PHOSPHATE 10 MG/ML
INJECTION INTRAMUSCULAR; INTRAVENOUS
Status: DISCONTINUED
Start: 2023-06-07 | End: 2023-06-07 | Stop reason: HOSPADM

## 2023-06-07 RX ADMIN — LIDOCAINE HYDROCHLORIDE 50 MG: 20 INJECTION INTRAVENOUS at 11:06

## 2023-06-07 RX ADMIN — PROPOFOL 50 MG: 10 INJECTION, EMULSION INTRAVENOUS at 11:06

## 2023-06-07 NOTE — ANESTHESIA POSTPROCEDURE EVALUATION
Anesthesia Post Evaluation    Patient: Adrianna Childers    Procedure(s) Performed: Procedure(s) (LRB):  Injection,steroid,epidural,transforaminal approach (Bilateral)    Final Anesthesia Type: MAC      Patient location during evaluation: OPS  Patient participation: Yes- Able to Participate  Level of consciousness: awake and alert and oriented  Post-procedure vital signs: reviewed and stable  Pain management: adequate  Airway patency: patent    PONV status at discharge: No PONV, vomiting (controlled) and nausea (controlled)  Anesthetic complications: no      Cardiovascular status: stable and blood pressure returned to baseline  Respiratory status: unassisted  Hydration status: euvolemic            Vitals Value Taken Time   /76 06/07/23 1130   Temp 36.5 °C (97.7 °F) 06/07/23 1114   Pulse 69 06/07/23 1129   Resp ** 06/07/23 1147   SpO2 97 % 06/07/23 1129   Vitals shown include unvalidated device data.      No case tracking events are documented in the log.      Pain/Janice Score: Janice Score: 10 (6/7/2023 11:30 AM)

## 2023-06-07 NOTE — H&P
History of Present Illness / Problem Focused Workflow      Adrianna Childers presents to the clinic with Back Pain (Patient is here to go over MRI results and low back pain. Rates pain level currently at a 3/10 but gets as high as 10. Patient is taking [place] from Dr. Ha with minimal relief. Also on Fexeril.)     This is a 57-year-old female who returns to clinic today for follow up of her chronic neck pain and low back pain.  She states that her neck pain and back pain have been getting worse recently.  It is radiating to the bilateral shoulders.  Her back pain was radiating into the buttocks bilaterally, but worse on the left.  She is interested in having an injection done for this.  We have discussed a spinal cord stimulator trial in the past and she is interested in pursuing this, if another injection does not significantly help.  She currently rates her pain as 3/10 on the NRS, but it gets up to 10/10 at worst.        Neck Pain      Back Pain        Review of Systems      Review of Systems   Musculoskeletal:  Positive for back pain, neck pain and neck stiffness.   Psychiatric/Behavioral:  Positive for sleep disturbance.    All other systems reviewed and are negative.      Medical / Social / Family History           Past Medical History:   Diagnosis Date    Anxiety      Depression      Thyroid disease                 Past Surgical History:   Procedure Laterality Date    JOINT REPLACEMENT        TUBAL LIGATION             Social History  Ms. Childers  reports that she has quit smoking. Her smoking use included cigarettes. She has never used smokeless tobacco. She reports current alcohol use of about 2.0 standard drinks per week. She reports that she does not use drugs.     Family History  Ms.'s Childers family history is not on file.     Medications and Allergies      Medications         Outpatient Medications Marked as Taking for the 4/20/23 encounter (Office Visit) with Erin Mock MD   Medication Sig  Dispense Refill    acetaminophen (TYLENOL) 650 MG TbSR Take 650 mg by mouth every 8 (eight) hours as needed.        aspirin 325 MG tablet Take 325 mg by mouth once daily.        chlorzoxazone (PARAFON FORTE) 500 mg Tab Take 1 tablet (500 mg total) by mouth 2 (two) times daily as needed (muscle spasms). 60 tablet 0    cyclobenzaprine (FLEXERIL) 10 MG tablet Take 10 mg by mouth 2 (two) times daily.        DULoxetine (CYMBALTA) 60 MG capsule Take 60 mg by mouth.        HYDROcodone-acetaminophen (NORCO)  mg per tablet Take 1 tablet by mouth every 8 (eight) hours as needed.        levothyroxine (SYNTHROID) 25 MCG tablet Take 25 mcg by mouth every morning.        LINZESS 290 mcg Cap capsule Take 290 mcg by mouth every morning.        loratadine (CLARITIN) 10 mg tablet Take 10 mg by mouth.        meloxicam (MOBIC) 15 MG tablet Take 15 mg by mouth.        potassium bicarbonate (K-LYTE) disintegrating tablet Take 99 mEq by mouth 2 (two) times daily.        potassium chloride (MICRO-K) 10 MEQ CpSR Take 10 mEq by mouth once.        pregabalin (LYRICA) 75 MG capsule Take 75 mg by mouth 2 (two) times daily.        traZODone (DESYREL) 100 MG tablet Take 100 mg by mouth every evening.        zolpidem (AMBIEN) 10 mg Tab Take 10 mg by mouth every evening.             Allergies  Review of patient's allergies indicates:  No Known Allergies     Physical Examination      There were no vitals filed for this visit.     Spine Musculoskeletal Exam     Gait    Gait is normal.     Inspection    Cervical Spine    Cervical spine inspection is normal.     Palpation    Cervical Spine    Right      Masses: none      Spasms: none      Crepitus: none      Muscle tone: normal    Left      Masses: none      Spasms: none      Crepitus: none      Muscle tone: normal     Range of Motion    Cervical Spine        Cervical spine range of motion additional comments: Limited range of motion in the cervical spine secondary to pain     Strength     Cervical Spine    Cervical spine motor exam is normal.     Sensory    Cervical Spine    Cervical spine sensation is normal.     General      Constitutional: appears stated age, well-developed and well-nourished    Scleral icterus: no    Labored breathing: no    Psychiatric: normal mood and affect and no acute distress    Neurological: alert and oriented x3    Skin: intact    Lymphadenopathy: none      Assessment and Plan (including Health Maintenance)       Problem List   Smart Sets   Document Outside HM   :     Plan:   DDD (degenerative disc disease), cervical     Lumbar degenerative disc disease     Cervical spondylosis     Chronic back pain greater than 3 months duration     Chronic neck pain        Discuss the results of her MRI of the lumbar spine today and she would like to schedule another injection for this.  She is being scheduled for left L3 and right L4 transforaminal epidural steroid injections today.  The plan was discussed with the patient and she wishes to proceed.     Problem List Items Addressed This Visit         Chronic neck pain     DDD (degenerative disc disease), cervical - Primary     Cervical spondylosis     Chronic back pain greater than 3 months duration     Lumbar degenerative disc disease            No future appointments.      There are no Patient Instructions on file for this visit.  No follow-ups on file.      Signature:     Erin Mock MD

## 2023-06-07 NOTE — ANESTHESIA PREPROCEDURE EVALUATION
2023  Adrianna Childers is a 57 y.o., female who presents with chronic back pain.  Diagnosis:        Lumbar degenerative disc disease [M51.36]       Cervical spondylosis [M47.812]       Chronic back pain greater than 3 months duration [M54.9, G89.29]       Radiculopathy, lumbar region [M54.16]        The pt. Comes to [facility] for the noted pain management procedure under IV Sedation / TIVA w/ Local.  Procedure: Injection,steroid,epidural,transforaminal approach (Bilateral: Back) - TFESI Left L3, Right L4            PMHx:  Other Medical History   Depression Anxiety   Thyroid disease Insomnia   Arthritis Seasonal allergies   Digestive disorder      Surgical History:  TUBAL LIGATION JOINT REPLACEMENT    SECTION COLONOSCOPY           Vital signs:        Lab Data:          Pre-op Assessment    I have reviewed the Patient Summary Reports.     I have reviewed the Nursing Notes. I have reviewed the NPO Status.   I have reviewed the Medications.     Review of Systems  Anesthesia Hx:  No problems with previous Anesthesia    Social:  Non-Smoker    Hematology/Oncology:  Hematology Normal   Oncology Normal     EENT/Dental:EENT/Dental Normal   Cardiovascular:  Cardiovascular Normal Exercise tolerance: good   Functional Capacity good / => 4 METS    Pulmonary:  Pulmonary Normal    Renal/:  Renal/ Normal     Hepatic/GI:  Hepatic/GI Normal    Musculoskeletal:   Arthritis     Neurological:  Neurology Normal    Endocrine:  Endocrine Normal    Dermatological:  Skin Normal    Psych:   Psychiatric History          Physical Exam  General: Alert, Oriented, Well nourished and Cooperative    Airway:  Mallampati: II   Mouth Opening: Normal  TM Distance: Normal  Tongue: Normal  Neck ROM: Normal ROM    Dental:  Intact    Chest/Lungs:  Clear to auscultation, Normal Respiratory Rate    Heart:  Rate: Normal  Rhythm: Regular  Rhythm        Anesthesia Plan  Type of Anesthesia, risks & benefits discussed:    Anesthesia Type: MAC, Gen Natural Airway  Intra-op Monitoring Plan: Standard ASA Monitors  Post Op Pain Control Plan: IV/PO Opioids PRN  Induction:  IV  ASA Score: 2  Day of Surgery Review of History & Physical: H&P Update referred to the surgeon/provider.    Ready For Surgery From Anesthesia Perspective.     .

## 2023-06-07 NOTE — DISCHARGE SUMMARY
[facility] - Periop Services  Discharge Note  Short Stay    Procedure(s) (LRB):  Injection,steroid,epidural,transforaminal approach (Bilateral)      OUTCOME: Patient tolerated treatment/procedure well without complication and is now ready for discharge.    DISPOSITION: Home or Self Care    FINAL DIAGNOSIS:  <principal problem not specified>    FOLLOWUP: In clinic    DISCHARGE INSTRUCTIONS:  No discharge procedures on file.     TIME SPENT ON DISCHARGE: 5 minutes

## 2023-06-07 NOTE — OP NOTE
Procedure:  Left L3  transforaminal epidural steroid injection    Right L4  transforaminal epidural steroid injection    Pre-Procedure Diagnoses:  Chronic back pain greater than 3 months  Lumbar degenerative disc disease  Lumbar radiculopathy  Lumbar disc displacement    Post-Procedure Diagnoses:  Chronic back pain greater than 3 months  Lumbar degenerative disc disease  Lumbar radiculopathy  Lumbar disc displacement    Anesthesia:  Local and MAC    Estimated Blood Loss:  < 2 ML    Consent:  The procedure, risks, benefits, and alternatives were discussed with the patient.  The patient voiced understanding and fully informed written consent was obtained.    Description of the Procedure:  The patient was taken to the operating room and placed in the prone position. The skin overlying the lumbar spine was prepped with Chloraprep and draped in the usual sterile fashion.  An oblique fluoroscopic view was obtained on the left side at L3, with the superior articular process of the inferior vertebral body aligned with the pedicle.  Skin anesthesia was achieved using 2 mL of lidocaine 1%.  A 22-gauge 3.5 -inch Quinke spinal needle was inserted and advanced under intermittent fluoroscopic views into the epidural space. Proper needle position was confirmed under AP, oblique, and lateral fluoroscopic views.  Negative aspiration for blood or CSF was confirmed. 1 mL of contrast was injected, which revealed spread into the epidural space.  Then a combination of 5 mg of dexamethasone with 1 mL of 0.25% bupivacaine was easily injected.   There was no pain on injection. The needle was removed intact and bleeding was nil.  The same procedure was repeated in identical fashion on the right side at L4.  Sterile bandages were applied. The patient was taken to the recovery room for further observation in stable condition. The patient was then discharged home with no complications.

## 2023-06-07 NOTE — TRANSFER OF CARE
"Anesthesia Transfer of Care Note    Patient: Adrianna Childers    Procedure(s) Performed: Procedure(s) (LRB):  Injection,steroid,epidural,transforaminal approach (Bilateral)    Patient location: OPS    Anesthesia Type: general    Transport from OR: Transported from OR on room air with adequate spontaneous ventilation    Post pain: adequate analgesia    Post assessment: no apparent anesthetic complications    Post vital signs: stable    Level of consciousness: awake, alert and oriented    Nausea/Vomiting: no nausea/vomiting    Complications: none    Transfer of care protocol was followed      Last vitals:   Visit Vitals  /71 (BP Location: Left arm, Patient Position: Lying)   Pulse 66   Temp 36.1 °C (97 °F) (Tympanic)   Resp 18   Ht 5' 0.5" (1.537 m)   Wt 100.9 kg (222 lb 7.1 oz)   Breastfeeding No   BMI 42.73 kg/m²     "

## 2023-06-08 VITALS
DIASTOLIC BLOOD PRESSURE: 76 MMHG | SYSTOLIC BLOOD PRESSURE: 114 MMHG | BODY MASS INDEX: 42 KG/M2 | WEIGHT: 222.44 LBS | RESPIRATION RATE: 18 BRPM | TEMPERATURE: 98 F | HEART RATE: 72 BPM | OXYGEN SATURATION: 93 % | HEIGHT: 61 IN

## 2023-06-20 ENCOUNTER — OFFICE VISIT (OUTPATIENT)
Dept: PAIN MEDICINE | Facility: CLINIC | Age: 58
End: 2023-06-20
Payer: MEDICARE

## 2023-06-20 VITALS
SYSTOLIC BLOOD PRESSURE: 126 MMHG | WEIGHT: 222 LBS | BODY MASS INDEX: 41.91 KG/M2 | HEART RATE: 76 BPM | TEMPERATURE: 99 F | HEIGHT: 61 IN | DIASTOLIC BLOOD PRESSURE: 81 MMHG

## 2023-06-20 DIAGNOSIS — M50.30 DDD (DEGENERATIVE DISC DISEASE), CERVICAL: ICD-10-CM

## 2023-06-20 DIAGNOSIS — M54.9 CHRONIC BACK PAIN GREATER THAN 3 MONTHS DURATION: ICD-10-CM

## 2023-06-20 DIAGNOSIS — M47.812 CERVICAL SPONDYLOSIS: ICD-10-CM

## 2023-06-20 DIAGNOSIS — M51.36 LUMBAR DEGENERATIVE DISC DISEASE: Primary | ICD-10-CM

## 2023-06-20 DIAGNOSIS — G89.29 CHRONIC BACK PAIN GREATER THAN 3 MONTHS DURATION: ICD-10-CM

## 2023-06-20 DIAGNOSIS — M47.816 LUMBAR SPONDYLOSIS: ICD-10-CM

## 2023-06-20 PROCEDURE — 99213 PR OFFICE/OUTPT VISIT, EST, LEVL III, 20-29 MIN: ICD-10-PCS | Mod: ,,, | Performed by: ANESTHESIOLOGY

## 2023-06-20 PROCEDURE — 99213 OFFICE O/P EST LOW 20 MIN: CPT | Mod: ,,, | Performed by: ANESTHESIOLOGY

## 2023-06-20 NOTE — PROGRESS NOTES
Erin Mock MD        PATIENT NAME: Adrianna Childers  : 1965  DATE: 23  MRN: [id number]      Billing Provider: Erin Mock MD  Level of Service:   Patient PCP Information       Provider PCP Type    Juan Pablo Boo MD General            Reason for Visit / Chief Complaint: Back Pain (Post-op Right L4 Left L3  transforaminal epidural steroid injection, pt states she received good relief pain level has decreased, lyrica and flexeril for relief, pain level  2/10)       Update PCP  Update Chief Complaint         History of Present Illness / Problem Focused Workflow     Adrianna Childers presents to the clinic with Back Pain (Post-op Right L4 Left L3  transforaminal epidural steroid injection, pt states she received good relief pain level has decreased, lyrica and flexeril for relief, pain level  2/10)     This is a 57-year-old female who returns to clinic today for follow up of her chronic neck pain and low back pain.  She states that her neck pain and back pain have been getting worse recently.  It is radiating to the bilateral shoulders.  Her back pain was radiating into the buttocks bilaterally, but worse on the left.  She underwent lumbar transforaminal epidural steroid injections a couple weeks ago.  She has been getting pretty good relief since then.  She has little bit of pain if she overdoes it and uses a heating pad for that.  She is complaining of more neck pain today would like to schedule an injection.      Neck Pain     Back Pain      Review of Systems     Review of Systems   Musculoskeletal:  Positive for back pain, neck pain and neck stiffness.   Psychiatric/Behavioral:  Positive for sleep disturbance.    All other systems reviewed and are negative.     Medical / Social / Family History     Past Medical History:   Diagnosis Date    Anxiety     Arthritis     Depression     Digestive disorder     Insomnia     Seasonal allergies     Thyroid disease        Past Surgical History:    Procedure Laterality Date     SECTION      x 2    COLONOSCOPY      JOINT REPLACEMENT Right     total knee    TRANSFORAMINAL EPIDURAL INJECTION OF STEROID Bilateral 2023    Procedure: Injection,steroid,epidural,transforaminal approach;  Surgeon: Erin Mock MD;  Location: [facility] OR;  Service: Pain Management;  Laterality: Bilateral;  TFESI Left L3, Right L4    TUBAL LIGATION         Social History  Ms. Childers  reports that she has quit smoking. Her smoking use included cigarettes. She has never used smokeless tobacco. She reports current alcohol use of about 2.0 standard drinks per week. She reports that she does not use drugs.    Family History  Ms.'s Childers family history is not on file.    Medications and Allergies     Medications  Outpatient Medications Marked as Taking for the 23 encounter (Office Visit) with Erin Mock MD   Medication Sig Dispense Refill    acetaminophen (TYLENOL) 650 MG TbSR Take 650 mg by mouth every 8 (eight) hours as needed.      aspirin 81 mg Cap Take 81 mg by mouth once daily.      chlorzoxazone (PARAFON FORTE) 500 mg Tab Take 1 tablet (500 mg total) by mouth 2 (two) times daily as needed (muscle spasms). 60 tablet 0    cholecalciferol, vitamin D3, 125 mcg (5,000 unit) Tab Take 5,000 Units by mouth nightly.      cyclobenzaprine (FLEXERIL) 10 MG tablet Take 10 mg by mouth 3 (three) times daily.      diphenhydrAMINE (BENADRYL) 25 mg capsule Take 25 mg by mouth nightly.      FLUoxetine 10 MG capsule Take 20 mg by mouth once daily.      HYDROcodone-acetaminophen (NORCO)  mg per tablet Take 1 tablet by mouth every 8 (eight) hours as needed.      levocetirizine (XYZAL) 5 MG tablet Take 5 mg by mouth Daily.      levothyroxine (SYNTHROID) 25 MCG tablet Take 25 mcg by mouth every morning.      LINZESS 290 mcg Cap capsule Take 290 mcg by mouth every morning.      loratadine (CLARITIN) 10 mg tablet Take 10 mg by mouth once daily.      MAGNESIUM CHLORIDE ORAL Take  1 tablet by mouth nightly.      meloxicam (MOBIC) 15 MG tablet Take 15 mg by mouth once daily.      multivitamin (THERAGRAN) per tablet Take 1 tablet by mouth once daily.      potassium bicarbonate (K-LYTE) disintegrating tablet Take 99 mEq by mouth nightly.      potassium chloride (MICRO-K) 10 MEQ CpSR Take 10 mEq by mouth once.      pregabalin (LYRICA) 75 MG capsule Take 75 mg by mouth 2 (two) times daily.      senna (SENOKOT) 8.6 mg tablet Take 17.2 mg by mouth once daily.      traZODone (DESYREL) 100 MG tablet Take 100 mg by mouth every evening.      zolpidem (AMBIEN) 10 mg Tab Take 10 mg by mouth every evening.         Allergies  Review of patient's allergies indicates:  No Known Allergies    Physical Examination     Vitals:    06/20/23 1403   BP: 126/81   Pulse: 76   Temp: 98.5 °F (36.9 °C)       Spine Musculoskeletal Exam    Gait    Gait is normal.    Inspection    Cervical Spine    Cervical spine inspection is normal.    Palpation    Cervical Spine    Right      Masses: none      Spasms: none      Crepitus: none      Muscle tone: normal    Left      Masses: none      Spasms: none      Crepitus: none      Muscle tone: normal    Range of Motion    Cervical Spine        Cervical spine range of motion additional comments: Limited range of motion in the cervical spine secondary to pain    Strength    Cervical Spine    Cervical spine motor exam is normal.    Sensory    Cervical Spine    Cervical spine sensation is normal.    General      Constitutional: appears stated age, well-developed and well-nourished    Scleral icterus: no    Labored breathing: no    Psychiatric: normal mood and affect and no acute distress    Neurological: alert and oriented x3    Skin: intact    Lymphadenopathy: none     Assessment and Plan (including Health Maintenance)      Problem List  Smart Sets  Document Outside HM   :    Plan:   Lumbar degenerative disc disease    Chronic back pain greater than 3 months duration    Lumbar  spondylosis    DDD (degenerative disc disease), cervical    Cervical spondylosis       She has gotten good relief since undergoing left L3 and right L4 transforaminal epidural steroid injections a couple weeks ago.  She is complaining of neck pain and would like to schedule an injection for this today.  I am scheduling her for a C7-T1 cervical epidural steroid injection.  The plan was discussed with the patient and she wishes to proceed.    Problem List Items Addressed This Visit       DDD (degenerative disc disease), cervical    Cervical spondylosis    Chronic back pain greater than 3 months duration    Lumbar degenerative disc disease - Primary     Other Visit Diagnoses       Lumbar spondylosis                  Future Appointments   Date Time Provider Department Center   7/20/2023 10:30 AM Cassie Richards NP [facility]        There are no Patient Instructions on file for this visit.  No follow-ups on file.     Signature:  Erin Mock MD      Date of encounter: 6/20/23

## 2023-07-28 ENCOUNTER — OFFICE VISIT (OUTPATIENT)
Dept: PAIN MEDICINE | Facility: CLINIC | Age: 58
End: 2023-07-28
Payer: MEDICARE

## 2023-07-28 VITALS
DIASTOLIC BLOOD PRESSURE: 71 MMHG | WEIGHT: 225 LBS | TEMPERATURE: 98 F | HEIGHT: 62 IN | BODY MASS INDEX: 41.41 KG/M2 | SYSTOLIC BLOOD PRESSURE: 118 MMHG | HEART RATE: 85 BPM

## 2023-07-28 DIAGNOSIS — M51.36 LUMBAR DEGENERATIVE DISC DISEASE: Primary | ICD-10-CM

## 2023-07-28 PROCEDURE — 99213 PR OFFICE/OUTPT VISIT, EST, LEVL III, 20-29 MIN: ICD-10-PCS | Mod: ,,, | Performed by: NURSE PRACTITIONER

## 2023-07-28 PROCEDURE — 99213 OFFICE O/P EST LOW 20 MIN: CPT | Mod: ,,, | Performed by: NURSE PRACTITIONER

## 2023-07-28 RX ORDER — FLUTICASONE PROPIONATE 50 MCG
2 SPRAY, SUSPENSION (ML) NASAL
COMMUNITY
Start: 2023-07-18

## 2023-07-28 NOTE — PROGRESS NOTES
ADMISSION HISTORY & PHYSICAL    SUBJECTIVE:    CHIEF COMPLAINT: Neck Pain (Pre-op WINIFRED  C7-T1 23, pt states nothing has changed since her last appointment, pain level 2/10 )       History of Present Illness: 58 y.o. female presents today for preoperative evaluation for cervical epidural steroid injection interlaminar C7-T1 on 2023. I reviewed the indications for procedure. The risks and benefits of the proposed and alternative treatments were discussed with the patient. Questions pertinent to the procedure were solicited and answered. No assurances were given. Informed consent was obtained. The patient expressed good understanding and wished to proceed with scheduling the procedure.     Review of Systems:   Constitutional: No fever, weakness, or fatigue.   Ear/Nose/Mouth/Throat: No nasal congestion or sore throat.   Respiratory: No shortness of breath or cough.   Cardiovascular: No chest pain, palpitations, or peripheral edema.   Gastrointestinal: No nausea, vomiting, or abdominal pain.   Genitourinary: No dysuria.  Musculoskeletal:  Neck pain radiating to bilateral shoulders.    Past Surgical History:   Procedure Laterality Date     SECTION      x 2    COLONOSCOPY      JOINT REPLACEMENT Right     total knee    TRANSFORAMINAL EPIDURAL INJECTION OF STEROID Bilateral 2023    Procedure: Injection,steroid,epidural,transforaminal approach;  Surgeon: Erin Mock MD;  Location: [facility];  Service: Pain Management;  Laterality: Bilateral;  TFESI Left L3, Right L4    TUBAL LIGATION          Past Medical History:   Diagnosis Date    Anxiety     Arthritis     Depression     Digestive disorder     Insomnia     Seasonal allergies     Thyroid disease         OBJECTIVE:    Vitals:    23 0841   BP: 118/71   Pulse: 85   Temp: 97.6 °F (36.4 °C)        Physical Exam:   General: Well-developed, well-nourished.  Neuro: Alert and oriented x 3.  Psych: Normal mood and affect.  HEENT: Normocephalic. PERRLA  EOM intact. Nose and throat clear.  Lungs: Clear to auscultation and percussion.  Heart: Regular rate and rhythm   Abdomen: Soft non-tender. Bowel sounds positive. No rebound tenderness.  Skin: No rashes or open wounds  Musculoskeletal: notable bilateral cervical paraspinal banding.Normal bilateral hand      ASSESSMENT:  1. Lumbar degenerative disc disease       PLAN:  Plan for to proceed with cervical epidural steroid injection interlaminar C7-T1 on 08/07/2023. The patient has been given. Post-operative appointment is scheduled for 2 weeks.  Patient to hold Mobic and 81 mg aspirin 7 days prior to procedure.

## 2023-07-28 NOTE — H&P (VIEW-ONLY)
ADMISSION HISTORY & PHYSICAL    SUBJECTIVE:    CHIEF COMPLAINT: Neck Pain (Pre-op WINIFRED  C7-T1 23, pt states nothing has changed since her last appointment, pain level 2/10 )       History of Present Illness: 58 y.o. female presents today for preoperative evaluation for cervical epidural steroid injection interlaminar C7-T1 on 2023. I reviewed the indications for procedure. The risks and benefits of the proposed and alternative treatments were discussed with the patient. Questions pertinent to the procedure were solicited and answered. No assurances were given. Informed consent was obtained. The patient expressed good understanding and wished to proceed with scheduling the procedure.     Review of Systems:   Constitutional: No fever, weakness, or fatigue.   Ear/Nose/Mouth/Throat: No nasal congestion or sore throat.   Respiratory: No shortness of breath or cough.   Cardiovascular: No chest pain, palpitations, or peripheral edema.   Gastrointestinal: No nausea, vomiting, or abdominal pain.   Genitourinary: No dysuria.  Musculoskeletal:  Neck pain radiating to bilateral shoulders.    Past Surgical History:   Procedure Laterality Date     SECTION      x 2    COLONOSCOPY      JOINT REPLACEMENT Right     total knee    TRANSFORAMINAL EPIDURAL INJECTION OF STEROID Bilateral 2023    Procedure: Injection,steroid,epidural,transforaminal approach;  Surgeon: Erin Mock MD;  Location: [facility];  Service: Pain Management;  Laterality: Bilateral;  TFESI Left L3, Right L4    TUBAL LIGATION          Past Medical History:   Diagnosis Date    Anxiety     Arthritis     Depression     Digestive disorder     Insomnia     Seasonal allergies     Thyroid disease         OBJECTIVE:    Vitals:    23 0841   BP: 118/71   Pulse: 85   Temp: 97.6 °F (36.4 °C)        Physical Exam:   General: Well-developed, well-nourished.  Neuro: Alert and oriented x 3.  Psych: Normal mood and affect.  HEENT: Normocephalic. PERRLA  EOM intact. Nose and throat clear.  Lungs: Clear to auscultation and percussion.  Heart: Regular rate and rhythm   Abdomen: Soft non-tender. Bowel sounds positive. No rebound tenderness.  Skin: No rashes or open wounds  Musculoskeletal: notable bilateral cervical paraspinal banding.Normal bilateral hand      ASSESSMENT:  1. Lumbar degenerative disc disease       PLAN:  Plan for to proceed with cervical epidural steroid injection interlaminar C7-T1 on 08/07/2023. The patient has been given. Post-operative appointment is scheduled for 2 weeks.  Patient to hold Mobic and 81 mg aspirin 7 days prior to procedure.

## 2023-08-06 ENCOUNTER — ANESTHESIA EVENT (OUTPATIENT)
Dept: SURGERY | Facility: HOSPITAL | Age: 58
End: 2023-08-06
Payer: MEDICARE

## 2023-08-06 RX ORDER — ONDANSETRON 2 MG/ML
4 INJECTION INTRAMUSCULAR; INTRAVENOUS DAILY PRN
Status: CANCELLED | OUTPATIENT
Start: 2023-08-06

## 2023-08-07 ENCOUNTER — HOSPITAL ENCOUNTER (OUTPATIENT)
Facility: HOSPITAL | Age: 58
Discharge: HOME | End: 2023-08-07
Attending: ANESTHESIOLOGY | Admitting: ANESTHESIOLOGY
Payer: MEDICARE

## 2023-08-07 ENCOUNTER — ANESTHESIA (OUTPATIENT)
Dept: SURGERY | Facility: HOSPITAL | Age: 58
End: 2023-08-07
Payer: MEDICARE

## 2023-08-07 DIAGNOSIS — M54.2 CHRONIC NECK PAIN: ICD-10-CM

## 2023-08-07 DIAGNOSIS — G89.29 CHRONIC NECK PAIN: ICD-10-CM

## 2023-08-07 PROCEDURE — 63600175 PHARM REV CODE 636 W HCPCS: Performed by: NURSE ANESTHETIST, CERTIFIED REGISTERED

## 2023-08-07 PROCEDURE — A4216 STERILE WATER/SALINE, 10 ML: HCPCS | Performed by: ANESTHESIOLOGY

## 2023-08-07 PROCEDURE — 62321 NJX INTERLAMINAR CRV/THRC: CPT | Mod: ,,, | Performed by: ANESTHESIOLOGY

## 2023-08-07 PROCEDURE — D9220A PRA ANESTHESIA: Mod: CRNA,,, | Performed by: NURSE ANESTHETIST, CERTIFIED REGISTERED

## 2023-08-07 PROCEDURE — 62321 PR INJ CERV/THORAC, W/GUIDANCE: ICD-10-PCS | Mod: ,,, | Performed by: ANESTHESIOLOGY

## 2023-08-07 PROCEDURE — 62321 NJX INTERLAMINAR CRV/THRC: CPT | Performed by: ANESTHESIOLOGY

## 2023-08-07 PROCEDURE — D9220A PRA ANESTHESIA: ICD-10-PCS | Mod: CRNA,,, | Performed by: NURSE ANESTHETIST, CERTIFIED REGISTERED

## 2023-08-07 PROCEDURE — 25000003 PHARM REV CODE 250: Performed by: NURSE ANESTHETIST, CERTIFIED REGISTERED

## 2023-08-07 PROCEDURE — D9220A PRA ANESTHESIA: Mod: ANES,,, | Performed by: ANESTHESIOLOGY

## 2023-08-07 PROCEDURE — 63600175 PHARM REV CODE 636 W HCPCS: Performed by: ANESTHESIOLOGY

## 2023-08-07 PROCEDURE — 25000003 PHARM REV CODE 250: Performed by: ANESTHESIOLOGY

## 2023-08-07 PROCEDURE — D9220A PRA ANESTHESIA: ICD-10-PCS | Mod: ANES,,, | Performed by: ANESTHESIOLOGY

## 2023-08-07 PROCEDURE — 37000008 HC ANESTHESIA 1ST 15 MINUTES: Performed by: ANESTHESIOLOGY

## 2023-08-07 RX ORDER — LIDOCAINE HYDROCHLORIDE 10 MG/ML
INJECTION, SOLUTION EPIDURAL; INFILTRATION; INTRACAUDAL; PERINEURAL
Status: DISCONTINUED | OUTPATIENT
Start: 2023-08-07 | End: 2023-08-07

## 2023-08-07 RX ORDER — LIDOCAINE HYDROCHLORIDE 10 MG/ML
1 INJECTION, SOLUTION EPIDURAL; INFILTRATION; INTRACAUDAL; PERINEURAL ONCE AS NEEDED
Status: DISCONTINUED | OUTPATIENT
Start: 2023-08-07 | End: 2023-08-07 | Stop reason: HOSPADM

## 2023-08-07 RX ORDER — LIDOCAINE HYDROCHLORIDE 10 MG/ML
INJECTION, SOLUTION EPIDURAL; INFILTRATION; INTRACAUDAL; PERINEURAL
Status: DISCONTINUED | OUTPATIENT
Start: 2023-08-07 | End: 2023-08-07 | Stop reason: HOSPADM

## 2023-08-07 RX ORDER — DEXAMETHASONE SODIUM PHOSPHATE 10 MG/ML
INJECTION INTRAMUSCULAR; INTRAVENOUS
Status: DISCONTINUED
Start: 2023-08-07 | End: 2023-08-07 | Stop reason: HOSPADM

## 2023-08-07 RX ORDER — SODIUM CHLORIDE, SODIUM LACTATE, POTASSIUM CHLORIDE, CALCIUM CHLORIDE 600; 310; 30; 20 MG/100ML; MG/100ML; MG/100ML; MG/100ML
INJECTION, SOLUTION INTRAVENOUS CONTINUOUS
Status: DISCONTINUED | OUTPATIENT
Start: 2023-08-07 | End: 2023-08-07 | Stop reason: HOSPADM

## 2023-08-07 RX ORDER — SODIUM CHLORIDE 9 MG/ML
INJECTION, SOLUTION INTRAMUSCULAR; INTRAVENOUS; SUBCUTANEOUS
Status: DISCONTINUED
Start: 2023-08-07 | End: 2023-08-07 | Stop reason: HOSPADM

## 2023-08-07 RX ORDER — PROPOFOL 10 MG/ML
VIAL (ML) INTRAVENOUS
Status: DISCONTINUED | OUTPATIENT
Start: 2023-08-07 | End: 2023-08-07

## 2023-08-07 RX ORDER — SODIUM CHLORIDE 9 MG/ML
INJECTION, SOLUTION INTRAVENOUS CONTINUOUS
Status: DISCONTINUED | OUTPATIENT
Start: 2023-08-07 | End: 2023-08-07 | Stop reason: HOSPADM

## 2023-08-07 RX ORDER — SODIUM CHLORIDE, SODIUM GLUCONATE, SODIUM ACETATE, POTASSIUM CHLORIDE AND MAGNESIUM CHLORIDE 30; 37; 368; 526; 502 MG/100ML; MG/100ML; MG/100ML; MG/100ML; MG/100ML
INJECTION, SOLUTION INTRAVENOUS CONTINUOUS
Status: DISCONTINUED | OUTPATIENT
Start: 2023-08-07 | End: 2023-08-07 | Stop reason: HOSPADM

## 2023-08-07 RX ORDER — DEXAMETHASONE SODIUM PHOSPHATE 10 MG/ML
INJECTION INTRAMUSCULAR; INTRAVENOUS
Status: DISCONTINUED | OUTPATIENT
Start: 2023-08-07 | End: 2023-08-07 | Stop reason: HOSPADM

## 2023-08-07 RX ORDER — LIDOCAINE HYDROCHLORIDE 10 MG/ML
INJECTION, SOLUTION EPIDURAL; INFILTRATION; INTRACAUDAL; PERINEURAL
Status: DISCONTINUED
Start: 2023-08-07 | End: 2023-08-07 | Stop reason: HOSPADM

## 2023-08-07 RX ORDER — SODIUM CHLORIDE 0.9 % (FLUSH) 0.9 %
SYRINGE (ML) INJECTION
Status: DISCONTINUED | OUTPATIENT
Start: 2023-08-07 | End: 2023-08-07 | Stop reason: HOSPADM

## 2023-08-07 RX ADMIN — LIDOCAINE HYDROCHLORIDE 25 MG: 10 INJECTION, SOLUTION EPIDURAL; INFILTRATION; INTRACAUDAL; PERINEURAL at 11:08

## 2023-08-07 RX ADMIN — PROPOFOL 70 MG: 10 INJECTION, EMULSION INTRAVENOUS at 11:08

## 2023-08-07 RX ADMIN — SODIUM CHLORIDE, POTASSIUM CHLORIDE, SODIUM LACTATE AND CALCIUM CHLORIDE: 600; 310; 30; 20 INJECTION, SOLUTION INTRAVENOUS at 11:08

## 2023-08-07 NOTE — DISCHARGE SUMMARY
[facility] Surgical - Periop Services  Discharge Note  Short Stay    Procedure(s) (LRB):  INJECTION, STEROID, SPINE, CERVICAL, EPIDURAL C7 T1 (N/A)      OUTCOME: Patient tolerated treatment/procedure well without complication and is now ready for discharge.    DISPOSITION: Home or Self Care    FINAL DIAGNOSIS:  <principal problem not specified>    FOLLOWUP: In clinic    DISCHARGE INSTRUCTIONS:  No discharge procedures on file.     TIME SPENT ON DISCHARGE: 5 minutes

## 2023-08-07 NOTE — ANESTHESIA POSTPROCEDURE EVALUATION
Anesthesia Post Evaluation    Patient: Adrianna Childers    Procedure(s) Performed: Procedure(s) (LRB):  INJECTION, STEROID, SPINE, CERVICAL, EPIDURAL C7 T1 (N/A)    Final Anesthesia Type: MAC      Patient location during evaluation: OPS  Patient participation: Yes- Able to Participate  Level of consciousness: awake and alert and oriented  Post-procedure vital signs: reviewed and stable  Airway patency: patent    PONV status at discharge: No PONV  Anesthetic complications: no      Cardiovascular status: blood pressure returned to baseline  Respiratory status: unassisted  Hydration status: euvolemic  Follow-up not needed.          Vitals Value Taken Time   /75 08/07/23 1107   Temp 37.1 °C (98.8 °F) 08/07/23 1107   Pulse 82 08/07/23 1107   Resp 18 08/07/23 1138   SpO2 94 % 08/07/23 1107         No case tracking events are documented in the log.      Pain/Janice Score: No data recorded

## 2023-08-07 NOTE — ANESTHESIA PREPROCEDURE EVALUATION
"                                                                                                             08/07/2023  Adrianna Childers is a 58 y.o., female   Pre-operative evaluation for Procedure(s) (LRB):  INJECTION, STEROID, SPINE, CERVICAL, EPIDURAL C7 T1 (N/A)    /75   Pulse 82   Temp 37.1 °C (98.8 °F) (Oral)   Ht 5' 2" (1.575 m)   Wt 102.1 kg (225 lb)   SpO2 (!) 94%   Breastfeeding No   BMI 41.15 kg/m²     Past Medical History:   Diagnosis Date    Anxiety     Arthritis     Depression     Digestive disorder     Insomnia     Seasonal allergies     Thyroid disease        Patient Active Problem List   Diagnosis    Chronic neck pain    DDD (degenerative disc disease), cervical    Cervical spondylosis    Chronic back pain greater than 3 months duration    Lumbar degenerative disc disease       Review of patient's allergies indicates:  No Known Allergies    Current Outpatient Medications   Medication Instructions    acetaminophen (TYLENOL) 650 mg, Oral, Every 8 hours PRN    aspirin 81 mg, Oral, Daily, Will stop on the 7/28 /23     chlorzoxazone (PARAFON FORTE) 500 mg, Oral, 2 times daily PRN    cholecalciferol (vitamin D3) 5,000 Units, Oral, Nightly    cyclobenzaprine (FLEXERIL) 10 mg, Oral, 3 times daily    diphenhydrAMINE (BENADRYL) 25 mg, Oral, Nightly    fluticasone propionate (FLONASE) 50 mcg/actuation nasal spray 2 sprays, Each Nostril    HYDROcodone-acetaminophen (NORCO)  mg per tablet 1 tablet, Oral, Every 8 hours PRN    levocetirizine (XYZAL) 5 mg, Oral, Daily    levothyroxine (SYNTHROID) 25 mcg, Oral, Every morning    LINZESS 290 mcg, Oral, Every morning    loratadine (CLARITIN) 10 mg, Oral, Daily    MAGNESIUM CHLORIDE ORAL 1 tablet, Oral, Nightly    meloxicam (MOBIC) 15 mg, Oral, Daily    multivitamin (THERAGRAN) per tablet 1 tablet, Oral, Daily    potassium bicarbonate (K-LYTE) disintegrating tablet 99 mEq, Oral, Nightly    potassium chloride (MICRO-K) " "10 MEQ CpSR 10 mEq, Oral, Once    pregabalin (LYRICA) 75 mg, Oral, 2 times daily    senna (SENOKOT) 17.2 mg, Oral, Daily    traZODone (DESYREL) 100 mg, Oral, Nightly    zolpidem (AMBIEN) 10 mg, Oral, Nightly       Past Surgical History:   Procedure Laterality Date     SECTION      x 2    COLONOSCOPY      JOINT REPLACEMENT Right     total knee    TRANSFORAMINAL EPIDURAL INJECTION OF STEROID Bilateral 2023    Procedure: Injection,steroid,epidural,transforaminal approach;  Surgeon: Erin Mock MD;  Location: [facility] OR;  Service: Pain Management;  Laterality: Bilateral;  TFESI Left L3, Right L4    TUBAL LIGATION           Lab Results   Component Value Date    WBC 9.0 2023    HGB 12.3 2023    HCT 39.7 2023    MCV 94.7 (H) 2023     2023          BMP  Lab Results   Component Value Date     2023    K 4.1 2023    CHLORIDE 106 2023    CO2 27 2023    GLUCOSE 97 2023    BUN 25.2 (H) 2023    CREATININE 0.79 2023    CALCIUM 9.7 2023    EGFRNONAA >60 2022        INR  No results for input(s): "PT", "INR", "PROTIME", "APTT" in the last 72 hours.        Diagnostic Studies:      EKG:  No results found for this or any previous visit.    No results found for this or any previous visit.        .      Pre-op Assessment    I have reviewed the Patient Summary Reports.    I have reviewed the NPO Status.   I have reviewed the Medications.     Review of Systems  Anesthesia Hx:  No problems with previous Anesthesia  Denies Family Hx of Anesthesia complications.   Denies Personal Hx of Anesthesia complications.   Cardiovascular:  Cardiovascular Normal  No Cardiac Complaints   Pulmonary:  Pulmonary Normal No Pulmonary Complaints   Hepatic/GI:   No Current GERD Sx       Physical Exam  General: Alert and Oriented    Airway:  Mallampati: II   Mouth Opening: Normal  TM Distance: Normal  Tongue: Normal  Neck ROM: Normal " ROM    Dental:  Edentulous, Dentures    Chest/Lungs:  Clear to auscultation, Normal Respiratory Rate    Heart:  Rate: Normal  Rhythm: Regular Rhythm        Anesthesia Plan  Type of Anesthesia, risks & benefits discussed:    Anesthesia Type: MAC  Intra-op Monitoring Plan: Standard ASA Monitors  Induction:  IV  Airway Plan: , Awake  Informed Consent: Informed consent signed with the Patient and all parties understand the risks and agree with anesthesia plan.  All questions answered.   ASA Score: 2  Day of Surgery Review of History & Physical: H&P Update referred to the surgeon/provider.  Anesthesia Plan Notes: Nasal Cannula vs O2 Via Facemask  Possible transition to General Anesthesia discussed.with  Pt/family. Accected    Ready For Surgery From Anesthesia Perspective.     .

## 2023-08-07 NOTE — TRANSFER OF CARE
"Anesthesia Transfer of Care Note    Patient: Adrianna Childers    Procedure(s) Performed: Procedure(s) (LRB):  INJECTION, STEROID, SPINE, CERVICAL, EPIDURAL C7 T1 (N/A)    Patient location: OPS    Anesthesia Type: MAC    Transport from OR: Transported from OR on room air with adequate spontaneous ventilation    Post pain: adequate analgesia    Post assessment: no apparent anesthetic complications    Post vital signs: stable    Level of consciousness: awake, alert and oriented    Complications: none    Transfer of care protocol was followed      Last vitals:   Visit Vitals  /75   Pulse 82   Temp 37.1 °C (98.8 °F) (Oral)   Ht 5' 2" (1.575 m)   Wt 103.6 kg (228 lb 6.3 oz)   SpO2 (!) 94%   Breastfeeding No   BMI 41.77 kg/m²     "

## 2023-08-07 NOTE — PROGRESS NOTES
Patient has surgical site to posterior left neck with 1 bandaid, no bleeding/bruising/hematoma present. Will continue to monitor

## 2023-08-09 VITALS
HEART RATE: 81 BPM | BODY MASS INDEX: 42.03 KG/M2 | HEIGHT: 62 IN | OXYGEN SATURATION: 96 % | DIASTOLIC BLOOD PRESSURE: 72 MMHG | TEMPERATURE: 99 F | WEIGHT: 228.38 LBS | SYSTOLIC BLOOD PRESSURE: 105 MMHG

## 2023-08-22 ENCOUNTER — OFFICE VISIT (OUTPATIENT)
Dept: PAIN MEDICINE | Facility: CLINIC | Age: 58
End: 2023-08-22
Payer: MEDICARE

## 2023-08-22 VITALS
HEIGHT: 62 IN | WEIGHT: 228 LBS | TEMPERATURE: 99 F | SYSTOLIC BLOOD PRESSURE: 119 MMHG | HEART RATE: 93 BPM | DIASTOLIC BLOOD PRESSURE: 77 MMHG | BODY MASS INDEX: 41.96 KG/M2

## 2023-08-22 DIAGNOSIS — G89.29 CHRONIC BACK PAIN GREATER THAN 3 MONTHS DURATION: ICD-10-CM

## 2023-08-22 DIAGNOSIS — G89.29 CHRONIC NECK PAIN: ICD-10-CM

## 2023-08-22 DIAGNOSIS — M54.9 CHRONIC BACK PAIN GREATER THAN 3 MONTHS DURATION: ICD-10-CM

## 2023-08-22 DIAGNOSIS — M50.30 DDD (DEGENERATIVE DISC DISEASE), CERVICAL: ICD-10-CM

## 2023-08-22 DIAGNOSIS — M47.812 CERVICAL SPONDYLOSIS: Primary | ICD-10-CM

## 2023-08-22 DIAGNOSIS — M51.36 LUMBAR DEGENERATIVE DISC DISEASE: ICD-10-CM

## 2023-08-22 DIAGNOSIS — M54.2 CHRONIC NECK PAIN: ICD-10-CM

## 2023-08-22 PROCEDURE — 99214 OFFICE O/P EST MOD 30 MIN: CPT | Mod: ,,, | Performed by: ANESTHESIOLOGY

## 2023-08-22 PROCEDURE — 99214 PR OFFICE/OUTPT VISIT, EST, LEVL IV, 30-39 MIN: ICD-10-PCS | Mod: ,,, | Performed by: ANESTHESIOLOGY

## 2023-08-22 RX ORDER — ACETAMINOPHEN AND CODEINE PHOSPHATE 300; 60 MG/1; MG/1
1 TABLET ORAL 3 TIMES DAILY
COMMUNITY
Start: 2023-08-14

## 2023-08-22 NOTE — PROGRESS NOTES
Erin Mock MD        PATIENT NAME: Adrianna Childers  : 1965  DATE: 23  MRN: [id number]      Billing Provider: Erin Mock MD  Level of Service:   Patient PCP Information       Provider PCP Type    Juan Pablo Boo MD General            Reason for Visit / Chief Complaint: Neck Pain (Post-op WINIFRED C7-T1 23, pt states she received great relief which has lasted to date, pain level has decreased, OTC and prescription medication for relief, pain level 0/10 denies pain )       Update PCP  Update Chief Complaint         History of Present Illness / Problem Focused Workflow     Adrianna Childers presents to the clinic with Neck Pain (Post-op WINIFRED C7-T1 23, pt states she received great relief which has lasted to date, pain level has decreased, OTC and prescription medication for relief, pain level 0/10 denies pain )     This is a 58-year-old female who returns to clinic today for follow up of her chronic neck pain and low back pain.  Her back pain was radiating into the buttocks bilaterally, but worse on the left.  She underwent lumbar transforaminal epidural steroid injections a couple months ago.  It helped for about 3 weeks.  She then underwent a cervical epidural steroid injection a couple of weeks ago and is doing well with that.  She states it took a couple of days to kick in, but she is much better now.  She states that she is not waking up during the night anymore from the pain in his not having constant stiffness in her neck.  She saw Dr. Mai about her lower back, but was hesitant about surgery.  She is asking about any other options.      Neck Pain     Back Pain        Review of Systems     Review of Systems   Musculoskeletal:  Positive for back pain, neck pain and neck stiffness.   Psychiatric/Behavioral:  Positive for sleep disturbance.    All other systems reviewed and are negative.       Medical / Social / Family History     Past Medical History:   Diagnosis Date    Anxiety      Arthritis     Depression     Digestive disorder     Insomnia     Seasonal allergies     Thyroid disease        Past Surgical History:   Procedure Laterality Date     SECTION      x 2    COLONOSCOPY      EPIDURAL STEROID INJECTION INTO CERVICAL SPINE N/A 2023    Procedure: INJECTION, STEROID, SPINE, CERVICAL, EPIDURAL C7 T1;  Surgeon: Erin Mock MD;  Location: [facility] OR;  Service: Pain Management;  Laterality: N/A;  Cervical Steroid Injection C7-T1    JOINT REPLACEMENT Right     total knee    TRANSFORAMINAL EPIDURAL INJECTION OF STEROID Bilateral 2023    Procedure: Injection,steroid,epidural,transforaminal approach;  Surgeon: Erin Mock MD;  Location: [facility] OR;  Service: Pain Management;  Laterality: Bilateral;  TFESI Left L3, Right L4    TUBAL LIGATION         Social History  Ms. Childers  reports that she has quit smoking. Her smoking use included cigarettes. She has never used smokeless tobacco. She reports that she does not currently use alcohol. She reports that she does not use drugs.    Family History  Ms.'s Childers family history is not on file.    Medications and Allergies     Medications  Outpatient Medications Marked as Taking for the 23 encounter (Office Visit) with Erin Mock MD   Medication Sig Dispense Refill    acetaminophen (TYLENOL) 650 MG TbSR Take 650 mg by mouth every 8 (eight) hours as needed.      acetaminophen-codeine 300-60mg (TYLENOL #4) 300-60 mg Tab Take 1 tablet by mouth 3 (three) times daily.      aspirin 81 mg Cap Take 81 mg by mouth once daily. Will stop on the       chlorzoxazone (PARAFON FORTE) 500 mg Tab Take 1 tablet (500 mg total) by mouth 2 (two) times daily as needed (muscle spasms). 60 tablet 0    cholecalciferol, vitamin D3, 125 mcg (5,000 unit) Tab Take 5,000 Units by mouth nightly.      cyclobenzaprine (FLEXERIL) 10 MG tablet Take 10 mg by mouth 3 (three) times daily.      diphenhydrAMINE (BENADRYL) 25 mg capsule Take 25 mg by  mouth nightly.      fluticasone propionate (FLONASE) 50 mcg/actuation nasal spray 2 sprays by Each Nostril route.      levocetirizine (XYZAL) 5 MG tablet Take 5 mg by mouth Daily.      levothyroxine (SYNTHROID) 25 MCG tablet Take 25 mcg by mouth every morning.      LINZESS 290 mcg Cap capsule Take 290 mcg by mouth every morning.      loratadine (CLARITIN) 10 mg tablet Take 10 mg by mouth once daily.      MAGNESIUM CHLORIDE ORAL Take 1 tablet by mouth nightly.      meloxicam (MOBIC) 15 MG tablet Take 15 mg by mouth once daily.      multivitamin (THERAGRAN) per tablet Take 1 tablet by mouth once daily.      potassium bicarbonate (K-LYTE) disintegrating tablet Take 99 mEq by mouth nightly.      potassium chloride (MICRO-K) 10 MEQ CpSR Take 10 mEq by mouth once.      pregabalin (LYRICA) 75 MG capsule Take 75 mg by mouth 2 (two) times daily.      senna (SENOKOT) 8.6 mg tablet Take 17.2 mg by mouth once daily.      traZODone (DESYREL) 100 MG tablet Take 100 mg by mouth every evening.      zolpidem (AMBIEN) 10 mg Tab Take 10 mg by mouth every evening.         Allergies  Review of patient's allergies indicates:  No Known Allergies    Physical Examination     Vitals:    08/22/23 1017   BP: 119/77   Pulse: 93   Temp: 98.7 °F (37.1 °C)       Spine Musculoskeletal Exam    Gait    Gait is normal.    Inspection    Cervical Spine    Cervical spine inspection is normal.    Palpation    Cervical Spine    Right      Masses: none      Spasms: none      Crepitus: none      Muscle tone: normal    Left      Masses: none      Spasms: none      Crepitus: none      Muscle tone: normal    Range of Motion    Cervical Spine        Cervical spine range of motion additional comments: Limited range of motion in the cervical spine secondary to pain    Strength    Cervical Spine    Cervical spine motor exam is normal.    Sensory    Cervical Spine    Cervical spine sensation is normal.    General      Constitutional: appears stated age,  well-developed and well-nourished    Scleral icterus: no    Labored breathing: no    Psychiatric: normal mood and affect and no acute distress    Neurological: alert and oriented x3    Skin: intact    Lymphadenopathy: none  CV: extremities warm, well-perfused  Resp: nonlabored breathing       Assessment and Plan (including Health Maintenance)      Problem List  Smart Sets  Document Outside HM   :    Plan:   Cervical spondylosis    DDD (degenerative disc disease), cervical    Chronic neck pain    Lumbar degenerative disc disease  -     Ambulatory referral/consult to Psychology; Future; Expected date: 08/29/2023    Chronic back pain greater than 3 months duration  -     Ambulatory referral/consult to Psychology; Future; Expected date: 08/29/2023      We have discussed spinal cord stimulation in the past and she would now like to pursue that for relief of her chronic low back pain.  She has seen a spine surgeon, but was hesitant about moving forward with surgery.  The injections are only helping for a couple of weeks.  She is getting good relief of her neck pain from her cervical epidural steroid injection done a couple of weeks ago.  I will go ahead and place a referral for her psychological clearance.  Once we obtain that, we will request authorization from her insurance for the trial.    Problem List Items Addressed This Visit       Chronic neck pain    DDD (degenerative disc disease), cervical    Cervical spondylosis - Primary    Chronic back pain greater than 3 months duration    Relevant Orders    Ambulatory referral/consult to Psychology    Lumbar degenerative disc disease    Relevant Orders    Ambulatory referral/consult to Psychology           No future appointments.       There are no Patient Instructions on file for this visit.  No follow-ups on file.     Signature:  Erin Mock MD      Date of encounter: 8/22/23

## 2023-09-13 ENCOUNTER — TELEPHONE (OUTPATIENT)
Dept: PAIN MEDICINE | Facility: CLINIC | Age: 58
End: 2023-09-13
Payer: MEDICARE

## 2023-09-19 DIAGNOSIS — M51.36 LUMBAR DEGENERATIVE DISC DISEASE: Primary | ICD-10-CM

## 2023-09-19 DIAGNOSIS — M54.16 RADICULOPATHY, LUMBAR REGION: ICD-10-CM

## 2023-09-19 DIAGNOSIS — M54.16 LUMBAR RADICULOPATHY, CHRONIC: ICD-10-CM

## 2023-10-03 ENCOUNTER — OFFICE VISIT (OUTPATIENT)
Dept: PAIN MEDICINE | Facility: CLINIC | Age: 58
End: 2023-10-03
Payer: MEDICARE

## 2023-10-03 VITALS
HEIGHT: 62 IN | TEMPERATURE: 98 F | HEART RATE: 83 BPM | SYSTOLIC BLOOD PRESSURE: 116 MMHG | WEIGHT: 230 LBS | BODY MASS INDEX: 42.33 KG/M2 | DIASTOLIC BLOOD PRESSURE: 69 MMHG

## 2023-10-03 DIAGNOSIS — M51.36 LUMBAR DEGENERATIVE DISC DISEASE: ICD-10-CM

## 2023-10-03 DIAGNOSIS — M54.16 RADICULOPATHY, LUMBAR REGION: Primary | ICD-10-CM

## 2023-10-03 DIAGNOSIS — G89.4 CHRONIC PAIN SYNDROME: ICD-10-CM

## 2023-10-03 PROCEDURE — 99213 PR OFFICE/OUTPT VISIT, EST, LEVL III, 20-29 MIN: ICD-10-PCS | Mod: ,,, | Performed by: NURSE PRACTITIONER

## 2023-10-03 PROCEDURE — 99213 OFFICE O/P EST LOW 20 MIN: CPT | Mod: ,,, | Performed by: NURSE PRACTITIONER

## 2023-10-03 RX ORDER — NAPROXEN 250 MG/1
500 TABLET ORAL 2 TIMES DAILY WITH MEALS
COMMUNITY
End: 2023-10-03 | Stop reason: ALTCHOICE

## 2023-10-03 RX ORDER — AMOXICILLIN 875 MG/1
875 TABLET, FILM COATED ORAL 2 TIMES DAILY
Status: ON HOLD | COMMUNITY
Start: 2023-09-28 | End: 2023-10-16

## 2023-10-03 RX ORDER — IBUPROFEN 600 MG/1
600 TABLET ORAL 2 TIMES DAILY
COMMUNITY
Start: 2023-09-18 | End: 2024-04-02

## 2023-10-03 NOTE — PROGRESS NOTES
ADMISSION HISTORY & PHYSICAL    SUBJECTIVE:    CHIEF COMPLAINT: Pre-op Exam (Pre op for SCST 10/16/23 C/O pain level sitting 0 walking 8, Taking pain meds.)       History of Present Illness: 58 y.o. female presents today for preoperative evaluation for spinal cord neurostimulator trial 10/16/2023. I reviewed the indications for procedure. The risks and benefits of the proposed and alternative treatments were discussed with the patient. Questions pertinent to the procedure were solicited and answered. No assurances were given. Informed consent was obtained. The patient expressed good understanding and wished to proceed with scheduling the procedure.     Review of Systems:   Constitutional: No fever, weakness, or fatigue.   Ear/Nose/Mouth/Throat: No nasal congestion or sore throat.   Respiratory: No shortness of breath or cough.   Cardiovascular: No chest pain, palpitations, or peripheral edema.   Gastrointestinal: No nausea, vomiting, or abdominal pain.   Genitourinary: No dysuria.  Musculoskeletal:  Bilateral leg pain    Past Surgical History:   Procedure Laterality Date     SECTION      x 2    COLONOSCOPY      EPIDURAL STEROID INJECTION INTO CERVICAL SPINE N/A 2023    Procedure: INJECTION, STEROID, SPINE, CERVICAL, EPIDURAL C7 T1;  Surgeon: Erin Mock MD;  Location: [facility];  Service: Pain Management;  Laterality: N/A;  Cervical Steroid Injection C7-T1    JOINT REPLACEMENT Right     total knee    TRANSFORAMINAL EPIDURAL INJECTION OF STEROID Bilateral 2023    Procedure: Injection,steroid,epidural,transforaminal approach;  Surgeon: Erin Mock MD;  Location: [facility] OR;  Service: Pain Management;  Laterality: Bilateral;  TFESI Left L3, Right L4    TUBAL LIGATION          Past Medical History:   Diagnosis Date    Anxiety     Arthritis     Depression     Digestive disorder     Insomnia     Seasonal allergies     Thyroid disease         OBJECTIVE:    Vitals:    10/03/23 1057   BP: 116/69    Pulse: 83   Temp: 98 °F (36.7 °C)             Physical Exam:   General: Well-developed, well-nourished.  Neuro: Alert and oriented x 3.  Psych: Normal mood and affect.  HEENT: Normocephalic. PERRLA EOM intact. Nose and throat clear.  Lungs: Clear to auscultation and percussion.  Heart: Regular rate and rhythm   Abdomen: Soft non-tender. Bowel sounds positive. No rebound tenderness.  Skin: No rashes or open wounds  Musculoskeletal:+ pain to bilateral back and  hips SLR -bilarerally.     ASSESSMENT:  1. Radiculopathy, lumbar region    2. Lumbar degenerative disc disease       PLAN:  Plan for to proceed with spinal cord neurostimulator trial 10/16/2023.. The patient has been given preoperative instructions and prescriptions for post-operative medication. Post-operative appointment is scheduled for 2 weeks. Pt will complete antibiotics in 3 days for ear infection. She will stop NSAIDs 7 days prior to procedure.

## 2023-10-03 NOTE — H&P (VIEW-ONLY)
ADMISSION HISTORY & PHYSICAL    SUBJECTIVE:    CHIEF COMPLAINT: Pre-op Exam (Pre op for SCST 10/16/23 C/O pain level sitting 0 walking 8, Taking pain meds.)       History of Present Illness: 58 y.o. female presents today for preoperative evaluation for spinal cord neurostimulator trial 10/16/2023. I reviewed the indications for procedure. The risks and benefits of the proposed and alternative treatments were discussed with the patient. Questions pertinent to the procedure were solicited and answered. No assurances were given. Informed consent was obtained. The patient expressed good understanding and wished to proceed with scheduling the procedure.     Review of Systems:   Constitutional: No fever, weakness, or fatigue.   Ear/Nose/Mouth/Throat: No nasal congestion or sore throat.   Respiratory: No shortness of breath or cough.   Cardiovascular: No chest pain, palpitations, or peripheral edema.   Gastrointestinal: No nausea, vomiting, or abdominal pain.   Genitourinary: No dysuria.  Musculoskeletal:  Bilateral leg pain    Past Surgical History:   Procedure Laterality Date     SECTION      x 2    COLONOSCOPY      EPIDURAL STEROID INJECTION INTO CERVICAL SPINE N/A 2023    Procedure: INJECTION, STEROID, SPINE, CERVICAL, EPIDURAL C7 T1;  Surgeon: Erin Mock MD;  Location: [facility];  Service: Pain Management;  Laterality: N/A;  Cervical Steroid Injection C7-T1    JOINT REPLACEMENT Right     total knee    TRANSFORAMINAL EPIDURAL INJECTION OF STEROID Bilateral 2023    Procedure: Injection,steroid,epidural,transforaminal approach;  Surgeon: Erin Mock MD;  Location: [facility] OR;  Service: Pain Management;  Laterality: Bilateral;  TFESI Left L3, Right L4    TUBAL LIGATION          Past Medical History:   Diagnosis Date    Anxiety     Arthritis     Depression     Digestive disorder     Insomnia     Seasonal allergies     Thyroid disease         OBJECTIVE:    Vitals:    10/03/23 1057   BP: 116/69    Pulse: 83   Temp: 98 °F (36.7 °C)             Physical Exam:   General: Well-developed, well-nourished.  Neuro: Alert and oriented x 3.  Psych: Normal mood and affect.  HEENT: Normocephalic. PERRLA EOM intact. Nose and throat clear.  Lungs: Clear to auscultation and percussion.  Heart: Regular rate and rhythm   Abdomen: Soft non-tender. Bowel sounds positive. No rebound tenderness.  Skin: No rashes or open wounds  Musculoskeletal:+ pain to bilateral back and  hips SLR -bilarerally.     ASSESSMENT:  1. Radiculopathy, lumbar region    2. Lumbar degenerative disc disease       PLAN:  Plan for to proceed with spinal cord neurostimulator trial 10/16/2023.. The patient has been given preoperative instructions and prescriptions for post-operative medication. Post-operative appointment is scheduled for 2 weeks. Pt will complete antibiotics in 3 days for ear infection. She will stop NSAIDs 7 days prior to procedure.

## 2023-10-10 NOTE — DISCHARGE INSTRUCTIONS
Patient Education       Stimulation Discharge Instructions     What care is needed at home?   Learn how to program and adjust your stimulator.  Avoid things that make you twist, bend, or stretch while you heal.  If you have a headache, lie flat and drink plenty of fluids.  Keep your dressing clean, dry, and intact at all times.   Sponge bathe only until your doctor say otherwise. Do not get the dressing wet.  No heavy lifting over 10 pounds.  Ask your doctor when you may go back to your normal activities like work, driving, or sex.  Wash your hands before and after touching your wound or dressing.  Try to avoid coughing or sneezing. Also try to avoid straining during bowel movements.  Ask your doctor if you need to limit your driving or not drive for a few weeks.  Call the number on your SCS box the morning after to let them know how you are doing.  What follow-up care is needed?   Be sure to keep your follow up visit.  What lifestyle changes are needed?   Your doctor will give you a card to carry that tells about your device. Always keep it with you.  Tell other doctors that you have a SCS if you need a procedure such as MRI or surgery.  Your SCS may set off metal detectors at places like the airport. Tell airport staff that you have an SCS before you go through the security process. Show them your medical device ID card.  Your SCS may cause problems with things with a magnetic strip like a credit card or hotel key. It may also cause problems with a watch, clock, CD, or other recording. Keep these things at least 2 inches (5 cm) away from your stimulator.  Anti-theft devices in stores may cause your SCS to send out more impulses. It is best to turn your SCS off before walking through one of these devices.  Will physical activity be limited?   You may have to limit your activity. Talk to your doctor about the right amount of activity for you.  Short walks are good for you.  While you heal, your doctor may want you  to:  Limit car trips to those that are really needed  Avoid activities that cause you to bend, stretch, or twist, like working in the garden, using the vacuum, etc.  What changes to diet are needed?   Eat high fiber foods. These include whole grains, fruits, and vegetables.  Limit sugary, fatty, and starchy foods.  Drink 6 to 8 glasses of water each day.  What problems could happen?   Bleeding  Infection  Paralysis or weakness  Worse pain, numbness, or no pain relief  Problems with the stimulator or leads  Spinal fluid leakage  Headache  When do I need to call the doctor?   Signs of infection. These include a fever of 100.4°F (38°C) or higher, chills, wound that will not heal, or pain.  Signs of wound infection. These include swelling, redness, warmth around the wound; too much pain when touched; yellowish, greenish, or bloody discharge; foul smell coming from the cut site; cut site opens up.  You are getting too much or too little stimulation from your SCS  You are not feeling better in 2 to 3 days or you are feeling worse

## 2023-10-15 ENCOUNTER — ANESTHESIA EVENT (OUTPATIENT)
Dept: SURGERY | Facility: HOSPITAL | Age: 58
End: 2023-10-15
Payer: MEDICARE

## 2023-10-16 ENCOUNTER — HOSPITAL ENCOUNTER (OUTPATIENT)
Facility: HOSPITAL | Age: 58
Discharge: HOME | End: 2023-10-16
Attending: ANESTHESIOLOGY | Admitting: ANESTHESIOLOGY
Payer: MEDICARE

## 2023-10-16 ENCOUNTER — ANESTHESIA (OUTPATIENT)
Dept: SURGERY | Facility: HOSPITAL | Age: 58
End: 2023-10-16
Payer: MEDICARE

## 2023-10-16 DIAGNOSIS — M54.9 CHRONIC BACK PAIN GREATER THAN 3 MONTHS DURATION: Primary | ICD-10-CM

## 2023-10-16 DIAGNOSIS — G89.29 CHRONIC BACK PAIN GREATER THAN 3 MONTHS DURATION: Primary | ICD-10-CM

## 2023-10-16 PROCEDURE — 37000009 HC ANESTHESIA EA ADD 15 MINS: Performed by: ANESTHESIOLOGY

## 2023-10-16 PROCEDURE — D9220A PRA ANESTHESIA: Mod: ,,, | Performed by: NURSE ANESTHETIST, CERTIFIED REGISTERED

## 2023-10-16 PROCEDURE — 37000008 HC ANESTHESIA 1ST 15 MINUTES: Performed by: ANESTHESIOLOGY

## 2023-10-16 PROCEDURE — C1778 LEAD, NEUROSTIMULATOR: HCPCS | Performed by: ANESTHESIOLOGY

## 2023-10-16 PROCEDURE — A4217 STERILE WATER/SALINE, 500 ML: HCPCS | Performed by: ANESTHESIOLOGY

## 2023-10-16 PROCEDURE — D9220A PRA ANESTHESIA: ICD-10-PCS | Mod: ,,, | Performed by: NURSE ANESTHETIST, CERTIFIED REGISTERED

## 2023-10-16 PROCEDURE — 25000003 PHARM REV CODE 250: Performed by: ANESTHESIOLOGY

## 2023-10-16 PROCEDURE — 63600175 PHARM REV CODE 636 W HCPCS: Performed by: NURSE ANESTHETIST, CERTIFIED REGISTERED

## 2023-10-16 PROCEDURE — 63650 IMPLANT NEUROELECTRODES: CPT | Performed by: ANESTHESIOLOGY

## 2023-10-16 PROCEDURE — 63650 IMPLANT NEUROELECTRODES: CPT

## 2023-10-16 PROCEDURE — 63650 PR PERCUT IMPLNT NEUROELECT,EPIDURAL: ICD-10-PCS | Mod: ,,, | Performed by: ANESTHESIOLOGY

## 2023-10-16 PROCEDURE — 63600175 PHARM REV CODE 636 W HCPCS: Performed by: ANESTHESIOLOGY

## 2023-10-16 PROCEDURE — 63650 IMPLANT NEUROELECTRODES: CPT | Mod: ,,, | Performed by: ANESTHESIOLOGY

## 2023-10-16 DEVICE — IMPLANTABLE DEVICE: Type: IMPLANTABLE DEVICE | Site: BACK | Status: FUNCTIONAL

## 2023-10-16 RX ORDER — SODIUM CHLORIDE 9 MG/ML
INJECTION, SOLUTION INTRAVENOUS CONTINUOUS
Status: DISCONTINUED | OUTPATIENT
Start: 2023-10-16 | End: 2023-10-16 | Stop reason: HOSPADM

## 2023-10-16 RX ORDER — PROPOFOL 10 MG/ML
VIAL (ML) INTRAVENOUS
Status: DISCONTINUED | OUTPATIENT
Start: 2023-10-16 | End: 2023-10-16

## 2023-10-16 RX ORDER — CEPHALEXIN 500 MG/1
500 CAPSULE ORAL 4 TIMES DAILY
Qty: 28 CAPSULE | Refills: 0 | Status: SHIPPED | OUTPATIENT
Start: 2023-10-16 | End: 2023-10-23

## 2023-10-16 RX ORDER — LIDOCAINE HYDROCHLORIDE 10 MG/ML
INJECTION, SOLUTION EPIDURAL; INFILTRATION; INTRACAUDAL; PERINEURAL
Status: DISCONTINUED | OUTPATIENT
Start: 2023-10-16 | End: 2023-10-16 | Stop reason: HOSPADM

## 2023-10-16 RX ORDER — CEFAZOLIN SODIUM 1 G/3ML
1 INJECTION, POWDER, FOR SOLUTION INTRAMUSCULAR; INTRAVENOUS
Status: DISCONTINUED | OUTPATIENT
Start: 2023-10-16 | End: 2023-10-16 | Stop reason: HOSPADM

## 2023-10-16 RX ORDER — LIDOCAINE HYDROCHLORIDE 10 MG/ML
INJECTION INFILTRATION; PERINEURAL
Status: COMPLETED
Start: 2023-10-16 | End: 2023-10-16

## 2023-10-16 RX ORDER — MIDAZOLAM HYDROCHLORIDE 1 MG/ML
2 INJECTION INTRAMUSCULAR; INTRAVENOUS ONCE AS NEEDED
Status: DISCONTINUED | OUTPATIENT
Start: 2023-10-16 | End: 2023-10-16 | Stop reason: HOSPADM

## 2023-10-16 RX ORDER — ONDANSETRON 4 MG/1
8 TABLET, ORALLY DISINTEGRATING ORAL EVERY 6 HOURS PRN
Status: DISCONTINUED | OUTPATIENT
Start: 2023-10-16 | End: 2023-10-16 | Stop reason: HOSPADM

## 2023-10-16 RX ORDER — LIDOCAINE HYDROCHLORIDE 10 MG/ML
1 INJECTION, SOLUTION EPIDURAL; INFILTRATION; INTRACAUDAL; PERINEURAL ONCE
Status: DISCONTINUED | OUTPATIENT
Start: 2023-10-16 | End: 2023-10-16 | Stop reason: HOSPADM

## 2023-10-16 RX ORDER — LIDOCAINE HYDROCHLORIDE 10 MG/ML
INJECTION INFILTRATION; PERINEURAL
Status: DISCONTINUED
Start: 2023-10-16 | End: 2023-10-16 | Stop reason: HOSPADM

## 2023-10-16 RX ORDER — WATER FOR INJECTION,STERILE
VIAL (ML) INJECTION
Status: DISCONTINUED
Start: 2023-10-16 | End: 2023-10-16 | Stop reason: HOSPADM

## 2023-10-16 RX ORDER — LIDOCAINE HYDROCHLORIDE 10 MG/ML
1 INJECTION, SOLUTION EPIDURAL; INFILTRATION; INTRACAUDAL; PERINEURAL ONCE AS NEEDED
Status: COMPLETED | OUTPATIENT
Start: 2023-10-16 | End: 2023-10-16

## 2023-10-16 RX ORDER — TRIAMCINOLONE ACETONIDE 40 MG/ML
INJECTION, SUSPENSION INTRA-ARTICULAR; INTRAMUSCULAR
Status: DISCONTINUED
Start: 2023-10-16 | End: 2023-10-16 | Stop reason: WASHOUT

## 2023-10-16 RX ORDER — MEPERIDINE HYDROCHLORIDE 25 MG/ML
12.5 INJECTION INTRAMUSCULAR; INTRAVENOUS; SUBCUTANEOUS EVERY 10 MIN PRN
Status: DISCONTINUED | OUTPATIENT
Start: 2023-10-16 | End: 2023-10-16 | Stop reason: HOSPADM

## 2023-10-16 RX ORDER — PROCHLORPERAZINE EDISYLATE 5 MG/ML
5 INJECTION INTRAMUSCULAR; INTRAVENOUS EVERY 30 MIN PRN
Status: DISCONTINUED | OUTPATIENT
Start: 2023-10-16 | End: 2023-10-16 | Stop reason: HOSPADM

## 2023-10-16 RX ORDER — ONDANSETRON 2 MG/ML
4 INJECTION INTRAMUSCULAR; INTRAVENOUS DAILY PRN
Status: DISCONTINUED | OUTPATIENT
Start: 2023-10-16 | End: 2023-10-16 | Stop reason: HOSPADM

## 2023-10-16 RX ORDER — WATER 1000 ML/1000ML
INJECTION, SOLUTION INTRAVENOUS
Status: DISCONTINUED | OUTPATIENT
Start: 2023-10-16 | End: 2023-10-16 | Stop reason: HOSPADM

## 2023-10-16 RX ORDER — LORAZEPAM 2 MG/ML
0.25 INJECTION INTRAMUSCULAR ONCE AS NEEDED
Status: DISCONTINUED | OUTPATIENT
Start: 2023-10-16 | End: 2023-10-16

## 2023-10-16 RX ADMIN — LIDOCAINE HYDROCHLORIDE 10 MG: 10 INJECTION, SOLUTION EPIDURAL; INFILTRATION; INTRACAUDAL; PERINEURAL at 07:10

## 2023-10-16 RX ADMIN — PROPOFOL 30 MG: 10 INJECTION, EMULSION INTRAVENOUS at 07:10

## 2023-10-16 RX ADMIN — PROPOFOL 50 MG: 10 INJECTION, EMULSION INTRAVENOUS at 08:10

## 2023-10-16 RX ADMIN — SODIUM CHLORIDE, POTASSIUM CHLORIDE, SODIUM LACTATE AND CALCIUM CHLORIDE: 600; 310; 30; 20 INJECTION, SOLUTION INTRAVENOUS at 07:10

## 2023-10-16 RX ADMIN — PROPOFOL 50 MG: 10 INJECTION, EMULSION INTRAVENOUS at 07:10

## 2023-10-16 RX ADMIN — CEFAZOLIN 1 G: 330 INJECTION, POWDER, FOR SOLUTION INTRAMUSCULAR; INTRAVENOUS at 07:10

## 2023-10-16 NOTE — ANESTHESIA PREPROCEDURE EVALUATION
"                                                                                                             10/15/2023  Adrianna Childers is a 58 y.o., female with BMI of 42 presents as an outpatient for spinal cord stimulator trial (chronic lumbar back pain).    Last 3 sets of Vitals        9/20/2023    10:00 AM 10/3/2023    10:57 AM 10/16/2023     6:17 AM   Vitals - 1 value per visit   SYSTOLIC  116 123   DIASTOLIC  69 75   Pulse  83 95   Temp  36.7 °C (98 °F) 36.9 °C (98.5 °F)   SPO2   96 %   Weight (lb) 230 230    Weight (kg) 104.327 104.327    Height 5' 2" (1.575 m) 5' 2" (1.575 m)    BMI (Calculated) 42.1 42.1          Lab Results   Component Value Date    WBC 9.0 03/29/2023    HGB 12.3 03/29/2023    HCT 39.7 03/29/2023    MCV 94.7 (H) 03/29/2023     03/29/2023          BMP  Lab Results   Component Value Date     03/29/2023    K 4.1 03/29/2023    CO2 27 03/29/2023    BUN 25.2 (H) 03/29/2023    CREATININE 0.79 03/29/2023    CALCIUM 9.7 03/29/2023    EGFRNONAA >60 06/09/2022    Pre-op Assessment    I have reviewed the Patient Summary Reports.    I have reviewed the NPO Status.   I have reviewed the Medications.     Review of Systems  Anesthesia Hx:   Denies Personal Hx of Anesthesia complications.   Social:  Non-Smoker    Cardiovascular:  Functional Capacity 2 METS, limited secondary to LBP        Physical Exam  General: Well nourished, Cooperative, Alert and Oriented    Airway:  Mouth Opening: Normal  TM Distance: Normal  Tongue: Normal  Neck ROM: Normal ROM    Dental:  Edentulous    Chest/Lungs:  Clear to auscultation, Normal Respiratory Rate    Heart:  Rate: Normal  Rhythm: Regular Rhythm        Anesthesia Plan  Type of Anesthesia, risks & benefits discussed:    Anesthesia Type: Gen Natural Airway  Intra-op Monitoring Plan: Standard ASA Monitors  Induction:  IV  Informed Consent: Informed consent signed with the Patient and all parties understand the risks and agree with anesthesia plan.  All " questions answered.   ASA Score: 3  Day of Surgery Review of History & Physical: H&P Update referred to the surgeon/provider.    Ready For Surgery From Anesthesia Perspective.     .

## 2023-10-16 NOTE — ANESTHESIA POSTPROCEDURE EVALUATION
Anesthesia Post Evaluation    Patient: Adrianna Childers    Procedure(s) Performed: Procedure(s) (LRB):  TRIAL, NEUROSTIMULATOR, SPINAL CORD (N/A)    Final Anesthesia Type: MAC      Patient location during evaluation: OPS  Patient participation: Yes- Able to Participate  Level of consciousness: awake and alert and oriented  Post-procedure vital signs: reviewed and stable  Pain management: adequate  Airway patency: patent    PONV status at discharge: No PONV  Anesthetic complications: no      Cardiovascular status: blood pressure returned to baseline and hemodynamically stable  Respiratory status: unassisted, spontaneous ventilation and room air  Hydration status: euvolemic  Follow-up not needed.          Vitals Value Taken Time   /88 10/16/23 0819   Temp 36.8 °C (98.2 °F) 10/16/23 0819   Pulse 99 10/16/23 0819   Resp 16 10/16/23 0819   SpO2 99 % 10/16/23 0819         No case tracking events are documented in the log.      Pain/Janice Score: No data recorded

## 2023-10-16 NOTE — PLAN OF CARE
Patient denies complaints.  Vital signs stable, dressing clean and dry.  Spinal cord stimulator external device secured around waist with belt.  Patient assisted in getting dressed.  Patient meets discharge criteria.

## 2023-10-16 NOTE — DISCHARGE SUMMARY
[facility] - Periop Services  Discharge Note  Short Stay    Procedure(s) (LRB):  TRIAL, NEUROSTIMULATOR, SPINAL CORD (N/A)      OUTCOME: Patient tolerated treatment/procedure well without complication and is now ready for discharge.    DISPOSITION: Home or Self Care    FINAL DIAGNOSIS:  <principal problem not specified>    FOLLOWUP: In clinic    DISCHARGE INSTRUCTIONS:  No discharge procedures on file.     TIME SPENT ON DISCHARGE: 5 minutes

## 2023-10-16 NOTE — OP NOTE
Spinal Cord Stimulator Trial ([employer])    Pre-Procedure Diagnoses:  1. Chronic pain syndrome  2. Chronic Low back pain  3. Lumbar radiculopathy  4. Lumbar degenerative disc disease    Post-Procedure Diagnoses:  1. Chronic pain syndrome  2. Chronic Low back pain  3. Lumbar radiculopathy  4. Lumbar degenerative disc disease    Anesthesia:  Local and MAC    Estimated Blood Loss:  None    Complications:  None    Antibiotics:  Ancef 1 g IV 30 minutes prior to start    Informed Consent:  The procedure, risks, benefits, and alternatives were discussed with the patient. There were no contraindications to the procedure. The patient expressed understanding and agreed to proceed. Fully informed written consent was obtained.     Description of the Procedure:  The patient was taken to the operating room. IV access was obtained prior to the start of the procedure. The patient was positioned prone on the fluoroscopy table. Continuous hemodynamic monitoring was initiated and continued throughout the duration of the procedure. The skin overlying the thoracolumbar spine was prepped with Chloraprep and draped into a sterile field. With fluoroscopic guidance, the location of the desired site for placement of the percutaneous leads was identified and 10 ml of lidocaine 1% was injected subcutaneously over the area. A 14-gauge 4-inch Tuohy needle was inserted and advanced via the paramedian approach into the epidural space at T12-L1 by loss of resistance to sterile water. A 16-contact lead was inserted through the needle and advanced up to T7. A second lead was placed in identical fashion and adjacent to the first. The leads were connected to the screening cables and test stimulation was performed which insured adequate coverage of the patient's normally painful areas. The cables were then disconnected and the needles were withdrawn over the leads. The stylets were also withdrawn and the leads were anchored to the skin  using 0-0 silk suture. Sterile bandages were applied. The patient was taken to the recovery room for further observation in stable condition.     Complex electronic spinal cord stimulator analysis was performed intraoperatively for 30 minutes and postoperatively for 60 minutes. The patient was then discharged home without any complications.

## 2023-10-16 NOTE — TRANSFER OF CARE
"Anesthesia Transfer of Care Note    Patient: Adrianna Childers    Procedure(s) Performed: Procedure(s) (LRB):  TRIAL, NEUROSTIMULATOR, SPINAL CORD (N/A)    Patient location: OPS    Anesthesia Type: MAC    Transport from OR: Transported from OR on room air with adequate spontaneous ventilation    Post pain: adequate analgesia    Post assessment: no apparent anesthetic complications and tolerated procedure well    Post vital signs: stable    Level of consciousness: awake, alert and oriented    Nausea/Vomiting: no nausea/vomiting    Complications: none    Transfer of care protocol was followed      Last vitals:   Visit Vitals  /88 (BP Location: Left arm, Patient Position: Lying)   Pulse 99   Temp 36.8 °C (98.2 °F) (Tympanic)   Resp 16   Ht 5' 2" (1.575 m)   Wt 106.2 kg (234 lb 2.1 oz)   SpO2 99%   Breastfeeding No   BMI 42.82 kg/m²     "

## 2023-10-16 NOTE — PLAN OF CARE
Problem: Adult Inpatient Plan of Care  Goal: Plan of Care Review  Outcome: Met  Goal: Patient-Specific Goal (Individualized)  Outcome: Met  Goal: Absence of Hospital-Acquired Illness or Injury  Outcome: Met  Goal: Optimal Comfort and Wellbeing  Outcome: Met  Goal: Readiness for Transition of Care  Outcome: Met     Problem: Pain Acute  Goal: Acceptable Pain Control and Functional Ability  Outcome: Met     Problem: Fall Injury Risk  Goal: Absence of Fall and Fall-Related Injury  Outcome: Met     Problem: Infection  Goal: Absence of Infection Signs and Symptoms  Outcome: Met     Problem: Bariatric Environmental Safety  Goal: Safety Maintained with Care  Outcome: Met

## 2023-10-18 ENCOUNTER — TELEPHONE (OUTPATIENT)
Dept: PAIN MEDICINE | Facility: CLINIC | Age: 58
End: 2023-10-18
Payer: MEDICARE

## 2023-10-18 VITALS
BODY MASS INDEX: 43.08 KG/M2 | TEMPERATURE: 98 F | RESPIRATION RATE: 16 BRPM | HEIGHT: 62 IN | DIASTOLIC BLOOD PRESSURE: 73 MMHG | WEIGHT: 234.13 LBS | HEART RATE: 91 BPM | SYSTOLIC BLOOD PRESSURE: 121 MMHG | OXYGEN SATURATION: 97 %

## 2023-10-18 NOTE — TELEPHONE ENCOUNTER
----- Message from Erin Mock MD sent at 10/18/2023 10:17 AM CDT -----  Regarding: RE: patient request  Patient's activities are limited due to her spinal cord stimulator trial, October 16 - October 23. Please excuse her from driving to appointments during this time. Please feel free to contact me with any further questions.    Erin Mock MD    ----- Message -----  From: Kim Quiñones  Sent: 10/17/2023   1:20 PM CDT  To: Erin Mock MD  Subject: patient request                                  Patients son came by office dropped off letter from Adrianna Childers stating the following:    I am writing to ask for documentation on my limitation while I use the trial stimulator.  Such as driving, movement, activities, etc.  I need this documentation to prove to Dr. Ha and staff as to why I had to move his appointment back.  Until I get this document, they will not refill any meds until the 1st of November.  So I will not have any pain meds from the 18th-1st until I have this documentation.  [facility] fax [phone].     I called Dr. Ha office and spoke to Jyoti to clarify exactly what they are needing.  She states that the patient had an appointment scheduled for 10/18/23 and patient rescheduled it to November 1, stating that she is unable to drive and unable to bend. If this is correct Dr. Ha needs some stating this. Please advise?

## 2023-10-23 ENCOUNTER — OFFICE VISIT (OUTPATIENT)
Dept: PAIN MEDICINE | Facility: CLINIC | Age: 58
End: 2023-10-23
Payer: MEDICARE

## 2023-10-23 VITALS
HEART RATE: 89 BPM | WEIGHT: 230 LBS | HEIGHT: 62 IN | BODY MASS INDEX: 42.33 KG/M2 | DIASTOLIC BLOOD PRESSURE: 63 MMHG | SYSTOLIC BLOOD PRESSURE: 133 MMHG

## 2023-10-23 DIAGNOSIS — M54.16 LUMBAR RADICULITIS: ICD-10-CM

## 2023-10-23 DIAGNOSIS — M51.36 LUMBAR DEGENERATIVE DISC DISEASE: Primary | ICD-10-CM

## 2023-10-23 DIAGNOSIS — M47.816 LUMBAR SPONDYLOSIS: ICD-10-CM

## 2023-10-23 DIAGNOSIS — M54.9 CHRONIC BACK PAIN GREATER THAN 3 MONTHS DURATION: ICD-10-CM

## 2023-10-23 DIAGNOSIS — G89.29 CHRONIC BACK PAIN GREATER THAN 3 MONTHS DURATION: ICD-10-CM

## 2023-10-23 PROCEDURE — 99214 OFFICE O/P EST MOD 30 MIN: CPT | Mod: ,,, | Performed by: ANESTHESIOLOGY

## 2023-10-23 PROCEDURE — 99214 PR OFFICE/OUTPT VISIT, EST, LEVL IV, 30-39 MIN: ICD-10-PCS | Mod: ,,, | Performed by: ANESTHESIOLOGY

## 2023-10-23 RX ORDER — VENLAFAXINE 37.5 MG/1
37.5 TABLET ORAL 2 TIMES DAILY
COMMUNITY
Start: 2023-09-26

## 2023-10-23 NOTE — PROGRESS NOTES
Erin Mock MD        PATIENT NAME: Adrianna Childers  : 1965  DATE: 10/23/23  MRN: [id number]      Billing Provider: Erin Mock MD  Level of Service:   Patient PCP Information       Provider PCP Type    Juan Pablo Boo MD General            Reason for Visit / Chief Complaint: Lumbar Spine Pain (L-Spine) (Post spinal cord stimulator trial pt states helped a lot, just had pain where bone is, C/O pain level 0 sitting walking 5, Taking pain meds)       Update PCP  Update Chief Complaint         History of Present Illness / Problem Focused Workflow     Adrianna Childers presents to the clinic with Lumbar Spine Pain (L-Spine) (Post spinal cord stimulator trial pt states helped a lot, just had pain where bone is, C/O pain level 0 sitting walking 5, Taking pain meds)     This is a 58-year-old female who returns to clinic today for follow up of her chronic low back pain.  She underwent a [employer] SCS trial last week. She reports 70% relief over the past week. She is very pleased with the relief she got and would like to move forward with the permanent implant.      Neck Pain     Back Pain        Review of Systems     Review of Systems   Musculoskeletal:  Positive for back pain, neck pain and neck stiffness.   Psychiatric/Behavioral:  Positive for sleep disturbance.    All other systems reviewed and are negative.       Medical / Social / Family History     Past Medical History:   Diagnosis Date    Anxiety     Arthritis     Depression     Digestive disorder     Insomnia     Seasonal allergies     Thyroid disease        Past Surgical History:   Procedure Laterality Date     SECTION      x 2    COLONOSCOPY      EPIDURAL STEROID INJECTION INTO CERVICAL SPINE N/A 2023    Procedure: INJECTION, STEROID, SPINE, CERVICAL, EPIDURAL C7 T1;  Surgeon: Erin Mock MD;  Location: [facility];  Service: Pain Management;  Laterality: N/A;  Cervical Steroid Injection C7-T1    JOINT REPLACEMENT Right      total knee    TRANSFORAMINAL EPIDURAL INJECTION OF STEROID Bilateral 6/7/2023    Procedure: Injection,steroid,epidural,transforaminal approach;  Surgeon: Erin Mock MD;  Location: [facility] OR;  Service: Pain Management;  Laterality: Bilateral;  TFESI Left L3, Right L4    TRIAL OF SPINAL CORD NERVE STIMULATOR N/A 10/16/2023    Procedure: TRIAL, NEUROSTIMULATOR, SPINAL CORD;  Surgeon: Erin Mock MD;  Location: [facility] OR;  Service: Pain Management;  Laterality: N/A;  Spincal cord stimulator trail    TUBAL LIGATION         Social History  Ms. Childers  reports that she has quit smoking. Her smoking use included cigarettes. She has never used smokeless tobacco. She reports that she does not currently use alcohol. She reports that she does not use drugs.    Family History  Ms.'s Childers family history includes Heart attack in her mother; Seizures in her father; Stroke in her father.    Medications and Allergies     Medications  Outpatient Medications Marked as Taking for the 10/23/23 encounter (Office Visit) with Erin Mock MD   Medication Sig Dispense Refill    acetaminophen (TYLENOL) 650 MG TbSR Take 650 mg by mouth every 8 (eight) hours as needed.      acetaminophen-codeine 300-60mg (TYLENOL #4) 300-60 mg Tab Take 1 tablet by mouth 3 (three) times daily.      aspirin 81 mg Cap Take 81 mg by mouth once daily. Will stop on the 7/28 /23      cephALEXin (KEFLEX) 500 MG capsule Take 1 capsule (500 mg total) by mouth 4 (four) times daily. for 7 days 28 capsule 0    chlorzoxazone (PARAFON FORTE) 500 mg Tab Take 1 tablet (500 mg total) by mouth 2 (two) times daily as needed (muscle spasms). 60 tablet 0    cholecalciferol, vitamin D3, 125 mcg (5,000 unit) Tab Take 5,000 Units by mouth nightly.      cyclobenzaprine (FLEXERIL) 10 MG tablet Take 10 mg by mouth 3 (three) times daily.      diphenhydrAMINE (BENADRYL) 25 mg capsule Take 25 mg by mouth nightly.      fluticasone propionate (FLONASE) 50 mcg/actuation  nasal spray 2 sprays by Each Nostril route.      HYDROcodone-acetaminophen (NORCO)  mg per tablet Take 1 tablet by mouth every 8 (eight) hours as needed.      ibuprofen (ADVIL,MOTRIN) 600 MG tablet Take 600 mg by mouth 2 (two) times daily.      levocetirizine (XYZAL) 5 MG tablet Take 5 mg by mouth Daily.      levothyroxine (SYNTHROID) 25 MCG tablet Take 25 mcg by mouth every morning.      LINZESS 290 mcg Cap capsule Take 290 mcg by mouth every morning.      loratadine (CLARITIN) 10 mg tablet Take 10 mg by mouth once daily.      MAGNESIUM CHLORIDE ORAL Take 1 tablet by mouth nightly.      multivitamin (THERAGRAN) per tablet Take 1 tablet by mouth once daily.      potassium bicarbonate (K-LYTE) disintegrating tablet Take 99 mEq by mouth nightly.      potassium chloride (MICRO-K) 10 MEQ CpSR Take 10 mEq by mouth once.      pregabalin (LYRICA) 75 MG capsule Take 75 mg by mouth 2 (two) times daily.      senna (SENOKOT) 8.6 mg tablet Take 17.2 mg by mouth once daily.      traZODone (DESYREL) 100 MG tablet Take 100 mg by mouth every evening.      venlafaxine (EFFEXOR) 37.5 MG Tab Take 37.5 mg by mouth 2 (two) times daily.      zolpidem (AMBIEN) 10 mg Tab Take 10 mg by mouth every evening.         Allergies  Review of patient's allergies indicates:  No Known Allergies    Physical Examination     Vitals:    10/23/23 1124   BP: 133/63   Pulse: 89       Spine Musculoskeletal Exam    Gait    Gait is normal.    Inspection    Cervical Spine    Cervical spine inspection is normal.    Palpation    Cervical Spine    Right      Masses: none      Spasms: none      Crepitus: none      Muscle tone: normal    Left      Masses: none      Spasms: none      Crepitus: none      Muscle tone: normal    Range of Motion    Cervical Spine        Cervical spine range of motion additional comments: Limited range of motion in the cervical spine secondary to pain    Strength    Cervical Spine    Cervical spine motor exam is normal.    Sensory     Cervical Spine    Cervical spine sensation is normal.    General      Constitutional: appears stated age, well-developed and well-nourished    Scleral icterus: no    Labored breathing: no    Psychiatric: normal mood and affect and no acute distress    Neurological: alert and oriented x3    Skin: intact    Lymphadenopathy: none       Assessment and Plan (including Health Maintenance)      Problem List  Smart Sets  Document Outside HM   :    Plan:   Lumbar degenerative disc disease  -     Ambulatory referral/consult to Neurosurgery; Future; Expected date: 10/30/2023    Chronic back pain greater than 3 months duration  -     Ambulatory referral/consult to Neurosurgery; Future; Expected date: 10/30/2023    Lumbar spondylosis    Lumbar radiculitis  -     Ambulatory referral/consult to Neurosurgery; Future; Expected date: 10/30/2023       SCS trial leads removed today. Site clean, dry, intact. Bandaid applied.    Referral for SCS implant.    Problem List Items Addressed This Visit       Chronic back pain greater than 3 months duration    Relevant Orders    Ambulatory referral/consult to Neurosurgery    Lumbar degenerative disc disease - Primary    Relevant Orders    Ambulatory referral/consult to Neurosurgery    Lumbar spondylosis    Lumbar radiculitis    Relevant Orders    Ambulatory referral/consult to Neurosurgery         No future appointments.       There are no Patient Instructions on file for this visit.  No follow-ups on file.     Signature:  Erin Mock MD      Date of encounter: 10/23/23

## 2023-11-08 ENCOUNTER — TELEPHONE (OUTPATIENT)
Dept: ORTHOPEDICS | Facility: HOSPITAL | Age: 58
End: 2023-11-08
Payer: MEDICARE

## 2023-11-08 DIAGNOSIS — G89.29 CHRONIC BACK PAIN GREATER THAN 3 MONTHS DURATION: Primary | ICD-10-CM

## 2023-11-08 DIAGNOSIS — M54.9 CHRONIC BACK PAIN GREATER THAN 3 MONTHS DURATION: Primary | ICD-10-CM

## 2023-11-08 DIAGNOSIS — M51.36 LUMBAR DEGENERATIVE DISC DISEASE: ICD-10-CM

## 2023-11-08 DIAGNOSIS — M47.816 LUMBAR SPONDYLOSIS: ICD-10-CM

## 2023-11-08 NOTE — TELEPHONE ENCOUNTER
----- Message from Kim Quiñones sent at 11/6/2023  1:11 PM CST -----  Regarding: referral for IV[facility]N Rodas came by the office today stating that Deejay Mcghee MD does not take medicare under 65.  We called and checked with Dr. Hector Marques's office and he does take medicare under 65. Can please order new referral? Thanks

## 2023-11-13 ENCOUNTER — TELEPHONE (OUTPATIENT)
Dept: PAIN MEDICINE | Facility: CLINIC | Age: 58
End: 2023-11-13
Payer: MEDICARE

## 2023-11-13 DIAGNOSIS — G89.29 CHRONIC BACK PAIN GREATER THAN 3 MONTHS DURATION: Primary | ICD-10-CM

## 2023-11-13 DIAGNOSIS — M54.9 CHRONIC BACK PAIN GREATER THAN 3 MONTHS DURATION: Primary | ICD-10-CM

## 2023-11-13 DIAGNOSIS — M51.36 LUMBAR DEGENERATIVE DISC DISEASE: ICD-10-CM

## 2023-11-13 NOTE — TELEPHONE ENCOUNTER
----- Message from Kim Quiñones sent at 11/13/2023  1:21 PM CST -----  Regarding: Referral  Mrs. Beltran called to follow up on the referral for SCS to Dr. Gonsalez and spoke to [place] she advised that Dr. Gonsalez denied the referral.  Mrs. Rodas was verbally notified of this and she is requesting a referral to another provider?

## 2023-11-16 ENCOUNTER — TELEPHONE (OUTPATIENT)
Dept: PAIN MEDICINE | Facility: CLINIC | Age: 58
End: 2023-11-16
Payer: MEDICARE

## 2024-02-05 ENCOUNTER — LAB VISIT (OUTPATIENT)
Dept: LAB | Facility: HOSPITAL | Age: 59
End: 2024-02-05
Attending: FAMILY MEDICINE
Payer: MEDICARE

## 2024-02-05 DIAGNOSIS — M54.50 LOW BACK PAIN: Primary | ICD-10-CM

## 2024-02-05 DIAGNOSIS — N18.9 VITAMIN D DEFICIENCY DUE TO CHRONIC KIDNEY DISEASE: ICD-10-CM

## 2024-02-05 DIAGNOSIS — Z13.220 SCREENING FOR LIPOID DISORDERS: ICD-10-CM

## 2024-02-05 DIAGNOSIS — M54.50 LOW BACK PAIN: ICD-10-CM

## 2024-02-05 DIAGNOSIS — Z13.828 SCREENING FOR RHEUMATOID ARTHRITIS: ICD-10-CM

## 2024-02-05 DIAGNOSIS — E55.9 VITAMIN D DEFICIENCY: ICD-10-CM

## 2024-02-05 DIAGNOSIS — E55.9 VITAMIN D DEFICIENCY DUE TO CHRONIC KIDNEY DISEASE: ICD-10-CM

## 2024-02-05 DIAGNOSIS — R53.83 FATIGUE, UNSPECIFIED TYPE: ICD-10-CM

## 2024-02-05 DIAGNOSIS — Z13.21 SCREENING FOR MALNUTRITION: Primary | ICD-10-CM

## 2024-02-05 LAB
ALBUMIN SERPL-MCNC: 3.5 G/DL (ref 3.5–5)
ALBUMIN/GLOB SERPL: 1 RATIO (ref 1.1–2)
ALP SERPL-CCNC: 78 UNIT/L (ref 40–150)
ALT SERPL-CCNC: 22 UNIT/L (ref 0–55)
AST SERPL-CCNC: 20 UNIT/L (ref 5–34)
BASOPHILS # BLD AUTO: 0.03 X10(3)/MCL
BASOPHILS NFR BLD AUTO: 0.4 %
BILIRUB SERPL-MCNC: 0.2 MG/DL
BUN SERPL-MCNC: 21 MG/DL (ref 9.8–20.1)
CALCIUM SERPL-MCNC: 9.2 MG/DL (ref 8.4–10.2)
CHLORIDE SERPL-SCNC: 107 MMOL/L (ref 98–107)
CHOLEST SERPL-MCNC: 190 MG/DL
CHOLEST/HDLC SERPL: 5 {RATIO} (ref 0–5)
CO2 SERPL-SCNC: 29 MMOL/L (ref 22–29)
CREAT SERPL-MCNC: 0.78 MG/DL (ref 0.55–1.02)
DEPRECATED CALCIDIOL+CALCIFEROL SERPL-MC: 47 NG/ML (ref 30–80)
EOSINOPHIL # BLD AUTO: 0.2 X10(3)/MCL (ref 0–0.9)
EOSINOPHIL NFR BLD AUTO: 3 %
ERYTHROCYTE [DISTWIDTH] IN BLOOD BY AUTOMATED COUNT: 13.5 % (ref 11.5–17)
ERYTHROCYTE [SEDIMENTATION RATE] IN BLOOD: 27 MM/HR (ref 0–20)
GFR SERPLBLD CREATININE-BSD FMLA CKD-EPI: >60 MLS/MIN/1.73/M2
GLOBULIN SER-MCNC: 3.6 GM/DL (ref 2.4–3.5)
GLUCOSE SERPL-MCNC: 81 MG/DL (ref 74–100)
HCT VFR BLD AUTO: 37.8 % (ref 37–47)
HDLC SERPL-MCNC: 42 MG/DL (ref 35–60)
HGB BLD-MCNC: 12.2 G/DL (ref 12–16)
IMM GRANULOCYTES # BLD AUTO: 0.03 X10(3)/MCL (ref 0–0.04)
IMM GRANULOCYTES NFR BLD AUTO: 0.4 %
LDLC SERPL CALC-MCNC: 112 MG/DL (ref 50–140)
LYMPHOCYTES # BLD AUTO: 3.38 X10(3)/MCL (ref 0.6–4.6)
LYMPHOCYTES NFR BLD AUTO: 50.4 %
MCH RBC QN AUTO: 29.5 PG (ref 27–31)
MCHC RBC AUTO-ENTMCNC: 32.3 G/DL (ref 33–36)
MCV RBC AUTO: 91.3 FL (ref 80–94)
MONOCYTES # BLD AUTO: 0.4 X10(3)/MCL (ref 0.1–1.3)
MONOCYTES NFR BLD AUTO: 6 %
NEUTROPHILS # BLD AUTO: 2.67 X10(3)/MCL (ref 2.1–9.2)
NEUTROPHILS NFR BLD AUTO: 39.8 %
NRBC BLD AUTO-RTO: 0 %
OHS QRS DURATION: 116 MS
OHS QTC CALCULATION: 459 MS
PLATELET # BLD AUTO: 268 X10(3)/MCL (ref 130–400)
PMV BLD AUTO: 10.3 FL (ref 7.4–10.4)
POTASSIUM SERPL-SCNC: 3.7 MMOL/L (ref 3.5–5.1)
PROT SERPL-MCNC: 7.1 GM/DL (ref 6.4–8.3)
RBC # BLD AUTO: 4.14 X10(6)/MCL (ref 4.2–5.4)
RHEUMATOID FACT SERPL-ACNC: <13 IU/ML
SODIUM SERPL-SCNC: 140 MMOL/L (ref 136–145)
T4 SERPL-MCNC: 6.94 UG/DL (ref 4.87–11.72)
TRIGL SERPL-MCNC: 182 MG/DL (ref 37–140)
TSH SERPL-ACNC: 3.01 UIU/ML (ref 0.35–4.94)
VLDLC SERPL CALC-MCNC: 36 MG/DL
WBC # SPEC AUTO: 6.71 X10(3)/MCL (ref 4.5–11.5)

## 2024-02-05 PROCEDURE — 85025 COMPLETE CBC W/AUTO DIFF WBC: CPT

## 2024-02-05 PROCEDURE — 82306 VITAMIN D 25 HYDROXY: CPT

## 2024-02-05 PROCEDURE — 84436 ASSAY OF TOTAL THYROXINE: CPT

## 2024-02-05 PROCEDURE — 93010 ELECTROCARDIOGRAM REPORT: CPT | Mod: ,,, | Performed by: STUDENT IN AN ORGANIZED HEALTH CARE EDUCATION/TRAINING PROGRAM

## 2024-02-05 PROCEDURE — 84480 ASSAY TRIIODOTHYRONINE (T3): CPT

## 2024-02-05 PROCEDURE — 93005 ELECTROCARDIOGRAM TRACING: CPT

## 2024-02-05 PROCEDURE — 86431 RHEUMATOID FACTOR QUANT: CPT

## 2024-02-05 PROCEDURE — 84443 ASSAY THYROID STIM HORMONE: CPT

## 2024-02-05 PROCEDURE — 80061 LIPID PANEL: CPT

## 2024-02-05 PROCEDURE — 36415 COLL VENOUS BLD VENIPUNCTURE: CPT

## 2024-02-05 PROCEDURE — 86225 DNA ANTIBODY NATIVE: CPT

## 2024-02-05 PROCEDURE — 85652 RBC SED RATE AUTOMATED: CPT

## 2024-02-05 PROCEDURE — 80053 COMPREHEN METABOLIC PANEL: CPT

## 2024-02-06 LAB — T3 SERPL IA-MCNC: 146 NG/DL (ref 80–200)

## 2024-02-07 LAB
ANTINUCLEAR ANTIBODY SCREEN (OHS): NEGATIVE
DSDNA AB QUANT (OHS): 2.4 IU/ML

## 2024-03-06 ENCOUNTER — LAB VISIT (OUTPATIENT)
Dept: LAB | Facility: HOSPITAL | Age: 59
End: 2024-03-06
Attending: FAMILY MEDICINE
Payer: MEDICARE

## 2024-03-06 DIAGNOSIS — R53.83 FATIGUE, UNSPECIFIED TYPE: Primary | ICD-10-CM

## 2024-03-06 DIAGNOSIS — Z00.00 WELLNESS EXAMINATION: ICD-10-CM

## 2024-03-06 DIAGNOSIS — E03.9 MYXEDEMA HEART DISEASE: ICD-10-CM

## 2024-03-06 DIAGNOSIS — I51.9 MYXEDEMA HEART DISEASE: ICD-10-CM

## 2024-03-06 DIAGNOSIS — Z13.21 SCREENING FOR MALNUTRITION: ICD-10-CM

## 2024-03-06 LAB
ALBUMIN SERPL-MCNC: 3.6 G/DL (ref 3.5–5)
ALBUMIN/GLOB SERPL: 1 RATIO (ref 1.1–2)
ALP SERPL-CCNC: 94 UNIT/L (ref 40–150)
ALT SERPL-CCNC: 29 UNIT/L (ref 0–55)
AST SERPL-CCNC: 29 UNIT/L (ref 5–34)
BASOPHILS # BLD AUTO: 0.03 X10(3)/MCL
BASOPHILS NFR BLD AUTO: 0.5 %
BILIRUB SERPL-MCNC: 0.3 MG/DL
BUN SERPL-MCNC: 20.9 MG/DL (ref 9.8–20.1)
CALCIUM SERPL-MCNC: 9.1 MG/DL (ref 8.4–10.2)
CHLORIDE SERPL-SCNC: 110 MMOL/L (ref 98–107)
CHOLEST SERPL-MCNC: 228 MG/DL
CHOLEST/HDLC SERPL: 5 {RATIO} (ref 0–5)
CO2 SERPL-SCNC: 25 MMOL/L (ref 22–29)
CREAT SERPL-MCNC: 0.7 MG/DL (ref 0.55–1.02)
EOSINOPHIL # BLD AUTO: 0.15 X10(3)/MCL (ref 0–0.9)
EOSINOPHIL NFR BLD AUTO: 2.5 %
ERYTHROCYTE [DISTWIDTH] IN BLOOD BY AUTOMATED COUNT: 14 % (ref 11.5–17)
GFR SERPLBLD CREATININE-BSD FMLA CKD-EPI: >60 MLS/MIN/1.73/M2
GLOBULIN SER-MCNC: 3.5 GM/DL (ref 2.4–3.5)
GLUCOSE SERPL-MCNC: 98 MG/DL (ref 74–100)
HCT VFR BLD AUTO: 38.2 % (ref 37–47)
HDLC SERPL-MCNC: 44 MG/DL (ref 35–60)
HGB BLD-MCNC: 12.3 G/DL (ref 12–16)
IMM GRANULOCYTES # BLD AUTO: 0.03 X10(3)/MCL (ref 0–0.04)
IMM GRANULOCYTES NFR BLD AUTO: 0.5 %
LDLC SERPL CALC-MCNC: 148 MG/DL (ref 50–140)
LYMPHOCYTES # BLD AUTO: 2.93 X10(3)/MCL (ref 0.6–4.6)
LYMPHOCYTES NFR BLD AUTO: 48.8 %
MCH RBC QN AUTO: 29.6 PG (ref 27–31)
MCHC RBC AUTO-ENTMCNC: 32.2 G/DL (ref 33–36)
MCV RBC AUTO: 92 FL (ref 80–94)
MONOCYTES # BLD AUTO: 0.34 X10(3)/MCL (ref 0.1–1.3)
MONOCYTES NFR BLD AUTO: 5.7 %
NEUTROPHILS # BLD AUTO: 2.53 X10(3)/MCL (ref 2.1–9.2)
NEUTROPHILS NFR BLD AUTO: 42 %
NRBC BLD AUTO-RTO: 0 %
PLATELET # BLD AUTO: 300 X10(3)/MCL (ref 130–400)
PMV BLD AUTO: 10.6 FL (ref 7.4–10.4)
POTASSIUM SERPL-SCNC: 3.8 MMOL/L (ref 3.5–5.1)
PROT SERPL-MCNC: 7.1 GM/DL (ref 6.4–8.3)
RBC # BLD AUTO: 4.15 X10(6)/MCL (ref 4.2–5.4)
SODIUM SERPL-SCNC: 141 MMOL/L (ref 136–145)
T3FREE SERPL-MCNC: 2.91 PG/ML (ref 1.58–3.91)
T4 SERPL-MCNC: 6.55 UG/DL (ref 4.87–11.72)
TRIGL SERPL-MCNC: 181 MG/DL (ref 37–140)
TSH SERPL-ACNC: 1.12 UIU/ML (ref 0.35–4.94)
VLDLC SERPL CALC-MCNC: 36 MG/DL
WBC # SPEC AUTO: 6.01 X10(3)/MCL (ref 4.5–11.5)

## 2024-03-06 PROCEDURE — 84436 ASSAY OF TOTAL THYROXINE: CPT

## 2024-03-06 PROCEDURE — 80061 LIPID PANEL: CPT

## 2024-03-06 PROCEDURE — 36415 COLL VENOUS BLD VENIPUNCTURE: CPT

## 2024-03-06 PROCEDURE — 80053 COMPREHEN METABOLIC PANEL: CPT

## 2024-03-06 PROCEDURE — 85025 COMPLETE CBC W/AUTO DIFF WBC: CPT

## 2024-03-06 PROCEDURE — 84481 FREE ASSAY (FT-3): CPT

## 2024-03-06 PROCEDURE — 84443 ASSAY THYROID STIM HORMONE: CPT

## 2024-04-02 ENCOUNTER — OFFICE VISIT (OUTPATIENT)
Dept: PAIN MEDICINE | Facility: CLINIC | Age: 59
End: 2024-04-02
Payer: MEDICARE

## 2024-04-02 VITALS
SYSTOLIC BLOOD PRESSURE: 122 MMHG | HEART RATE: 104 BPM | WEIGHT: 230 LBS | HEIGHT: 62 IN | DIASTOLIC BLOOD PRESSURE: 76 MMHG | BODY MASS INDEX: 42.33 KG/M2

## 2024-04-02 DIAGNOSIS — G89.4 CHRONIC PAIN SYNDROME: ICD-10-CM

## 2024-04-02 DIAGNOSIS — M51.36 LUMBAR DEGENERATIVE DISC DISEASE: Primary | ICD-10-CM

## 2024-04-02 DIAGNOSIS — M46.1 SACROILIITIS: ICD-10-CM

## 2024-04-02 DIAGNOSIS — M54.9 CHRONIC BACK PAIN GREATER THAN 3 MONTHS DURATION: ICD-10-CM

## 2024-04-02 DIAGNOSIS — M53.3 SACROILIAC JOINT PAIN: ICD-10-CM

## 2024-04-02 DIAGNOSIS — G89.29 CHRONIC BACK PAIN GREATER THAN 3 MONTHS DURATION: ICD-10-CM

## 2024-04-02 DIAGNOSIS — M54.16 LUMBAR RADICULITIS: ICD-10-CM

## 2024-04-02 PROCEDURE — 3074F SYST BP LT 130 MM HG: CPT | Mod: CPTII,,, | Performed by: ANESTHESIOLOGY

## 2024-04-02 PROCEDURE — 3008F BODY MASS INDEX DOCD: CPT | Mod: CPTII,,, | Performed by: ANESTHESIOLOGY

## 2024-04-02 PROCEDURE — 1160F RVW MEDS BY RX/DR IN RCRD: CPT | Mod: CPTII,,, | Performed by: ANESTHESIOLOGY

## 2024-04-02 PROCEDURE — 3078F DIAST BP <80 MM HG: CPT | Mod: CPTII,,, | Performed by: ANESTHESIOLOGY

## 2024-04-02 PROCEDURE — 1159F MED LIST DOCD IN RCRD: CPT | Mod: CPTII,,, | Performed by: ANESTHESIOLOGY

## 2024-04-02 PROCEDURE — 99213 OFFICE O/P EST LOW 20 MIN: CPT | Mod: ,,, | Performed by: ANESTHESIOLOGY

## 2024-04-02 RX ORDER — PANTOPRAZOLE SODIUM 40 MG/1
40 TABLET, DELAYED RELEASE ORAL
COMMUNITY
Start: 2024-02-08

## 2024-04-02 RX ORDER — IBUPROFEN 800 MG/1
800 TABLET ORAL 3 TIMES DAILY
COMMUNITY
Start: 2024-03-11

## 2024-04-02 RX ORDER — MELOXICAM 15 MG/1
15 TABLET ORAL DAILY
COMMUNITY
Start: 2024-02-02

## 2024-04-02 RX ORDER — TRAMADOL HYDROCHLORIDE 50 MG/1
50 TABLET ORAL 3 TIMES DAILY
COMMUNITY
Start: 2024-02-03

## 2024-04-02 RX ORDER — PREGABALIN 100 MG/1
100 CAPSULE ORAL 3 TIMES DAILY
COMMUNITY
Start: 2024-03-06

## 2024-04-02 RX ORDER — TIZANIDINE 2 MG/1
2 TABLET ORAL NIGHTLY
COMMUNITY
Start: 2024-03-25

## 2024-04-02 RX ORDER — HYDROCODONE BITARTRATE AND ACETAMINOPHEN 5; 325 MG/1; MG/1
1 TABLET ORAL 3 TIMES DAILY
COMMUNITY
Start: 2024-03-11

## 2024-04-02 NOTE — PROGRESS NOTES
Erin Mock MD        PATIENT NAME: Adrianna Childers  : 1965  DATE: 24  MRN: [id number]      Billing Provider: Erin Mock MD  Level of Service:   Patient PCP Information       Provider PCP Type    Juan Pablo Boo MD General            Reason for Visit / Chief Complaint: Pain (Pt would like to discuss nerve block for cervical and lower back pain C/O pain level 3, Taking Norco Taking 5 mgs TID, had SCS sx 24.)       Update PCP  Update Chief Complaint         History of Present Illness / Problem Focused Workflow     Adrianna Childers presents to the clinic with Pain (Pt would like to discuss nerve block for cervical and lower back pain C/O pain level 3, Taking Norco Taking 5 mgs TID, had SCS sx 24.)     This is a 58-year-old female who returns to clinic today for follow up of her chronic low back pain.  She had a [employer] spinal cord stimulator implanted in February with Dr. Martino.  She states that it does help with her leg pain, however she is still complaining of low back pain as well as neck pain.  She sees Dr. Ha in his prescribed Norco, but states that the dose has been weaned and she is now having increased back pain.  She would like to have an injection to help with her continued back pain.    Neck Pain     Back Pain    Pain  Associated symptoms include neck pain.       Review of Systems     Review of Systems   Musculoskeletal:  Positive for back pain, neck pain and neck stiffness.   Psychiatric/Behavioral:  Positive for sleep disturbance.    All other systems reviewed and are negative.       Medical / Social / Family History     Past Medical History:   Diagnosis Date    Anxiety     Arthritis     Depression     Digestive disorder     Insomnia     Seasonal allergies     Thyroid disease        Past Surgical History:   Procedure Laterality Date     SECTION      x 2    COLONOSCOPY      EPIDURAL STEROID INJECTION INTO CERVICAL SPINE N/A 2023    Procedure:  INJECTION, STEROID, SPINE, CERVICAL, EPIDURAL C7 T1;  Surgeon: Erin Mock MD;  Location: [facility] OR;  Service: Pain Management;  Laterality: N/A;  Cervical Steroid Injection C7-T1    JOINT REPLACEMENT Right     total knee    TRANSFORAMINAL EPIDURAL INJECTION OF STEROID Bilateral 6/7/2023    Procedure: Injection,steroid,epidural,transforaminal approach;  Surgeon: Erin Mock MD;  Location: [facility] OR;  Service: Pain Management;  Laterality: Bilateral;  TFESI Left L3, Right L4    TRIAL OF SPINAL CORD NERVE STIMULATOR N/A 10/16/2023    Procedure: TRIAL, NEUROSTIMULATOR, SPINAL CORD;  Surgeon: Erin Mock MD;  Location: [facility] OR;  Service: Pain Management;  Laterality: N/A;  Spincal cord stimulator trail    TUBAL LIGATION         Social History  Ms. Childers  reports that she has quit smoking. Her smoking use included cigarettes. She has never used smokeless tobacco. She reports that she does not currently use alcohol. She reports that she does not use drugs.    Family History  Ms.'s Childers family history includes Heart attack in her mother; Seizures in her father; Stroke in her father.    Medications and Allergies     Medications  Outpatient Medications Marked as Taking for the 4/2/24 encounter (Office Visit) with Erin Mock MD   Medication Sig Dispense Refill    acetaminophen (TYLENOL) 650 MG TbSR Take 650 mg by mouth every 8 (eight) hours as needed.      acetaminophen-codeine 300-60mg (TYLENOL #4) 300-60 mg Tab Take 1 tablet by mouth 3 (three) times daily.      aspirin 81 mg Cap Take 81 mg by mouth once daily. Will stop on the 7/28 /23      chlorzoxazone (PARAFON FORTE) 500 mg Tab Take 1 tablet (500 mg total) by mouth 2 (two) times daily as needed (muscle spasms). 60 tablet 0    cholecalciferol, vitamin D3, 125 mcg (5,000 unit) Tab Take 5,000 Units by mouth nightly.      diphenhydrAMINE (BENADRYL) 25 mg capsule Take 25 mg by mouth nightly.      fluticasone propionate (FLONASE) 50 mcg/actuation nasal  spray 2 sprays by Each Nostril route.      HYDROcodone-acetaminophen (NORCO) 5-325 mg per tablet Take 1 tablet by mouth 3 (three) times daily.      ibuprofen (ADVIL,MOTRIN) 800 MG tablet Take 800 mg by mouth 3 (three) times daily.      levocetirizine (XYZAL) 5 MG tablet Take 5 mg by mouth Daily.      levothyroxine (SYNTHROID) 25 MCG tablet Take 25 mcg by mouth every morning.      LINZESS 290 mcg Cap capsule Take 290 mcg by mouth every morning.      loratadine (CLARITIN) 10 mg tablet Take 10 mg by mouth once daily.      MAGNESIUM CHLORIDE ORAL Take 1 tablet by mouth nightly.      meloxicam (MOBIC) 15 MG tablet Take 15 mg by mouth.      multivitamin (THERAGRAN) per tablet Take 1 tablet by mouth once daily.      pantoprazole (PROTONIX) 40 MG tablet Take 40 mg by mouth.      potassium bicarbonate (K-LYTE) disintegrating tablet Take 99 mEq by mouth nightly.      potassium chloride (MICRO-K) 10 MEQ CpSR Take 10 mEq by mouth once.      pregabalin (LYRICA) 100 MG capsule Take 100 mg by mouth 3 (three) times daily.      senna (SENOKOT) 8.6 mg tablet Take 17.2 mg by mouth once daily.      tiZANidine (ZANAFLEX) 2 MG tablet Take 2 mg by mouth every evening.      traMADoL (ULTRAM) 50 mg tablet Take 50 mg by mouth 3 (three) times daily.      traZODone (DESYREL) 100 MG tablet Take 100 mg by mouth every evening.      venlafaxine (EFFEXOR) 37.5 MG Tab Take 37.5 mg by mouth 2 (two) times daily.      zolpidem (AMBIEN) 10 mg Tab Take 10 mg by mouth every evening.      [DISCONTINUED] cyclobenzaprine (FLEXERIL) 10 MG tablet Take 10 mg by mouth 3 (three) times daily.      [DISCONTINUED] HYDROcodone-acetaminophen (NORCO)  mg per tablet Take 1 tablet by mouth every 8 (eight) hours as needed.      [DISCONTINUED] ibuprofen (ADVIL,MOTRIN) 600 MG tablet Take 600 mg by mouth 2 (two) times daily.      [DISCONTINUED] pregabalin (LYRICA) 75 MG capsule Take 75 mg by mouth 2 (two) times daily.         Allergies  Review of patient's allergies  indicates:  No Known Allergies    Physical Examination     Vitals:    04/02/24 0858   BP: 122/76   Pulse: 104       Spine Musculoskeletal Exam    Gait    Gait is normal.    Inspection    Cervical Spine    Cervical spine inspection is normal.    Thoracolumbar        Prior incision: midline thoracic    Incision: clean, dry, intact and well-healed    Incisional drainage: none    Palpation    Cervical Spine    Right      Masses: none      Spasms: none      Crepitus: none      Muscle tone: normal    Left      Masses: none      Spasms: none      Crepitus: none      Muscle tone: normal    Thoracolumbar    Tenderness: present      SI Joint: right and left    Range of Motion    Cervical Spine        Cervical spine range of motion additional comments: Limited range of motion in the cervical spine secondary to pain    Thoracolumbar        Thoracolumbar range of motion additional comments: Limited ROM in lumbar spine due to pain.    Strength    Cervical Spine    Cervical spine motor exam is normal.    Thoracolumbar    Thoracolumbar motor exam is normal.       Sensory    Cervical Spine    Cervical spine sensation is normal.    Thoracolumbar    Thoracolumbar sensation is normal.    Special Tests    Thoracolumbar      Right      BRITTNEY test: positive      Gaenslen's: positive      Left      BRITTNEY test: positive      Gaenslen's: positive    General      Constitutional: appears stated age, well-developed and well-nourished    Scleral icterus: no    Labored breathing: no    Psychiatric: normal mood and affect and no acute distress    Neurological: alert and oriented x3    Skin: intact    Lymphadenopathy: none       Assessment and Plan (including Health Maintenance)      Problem List  Smart Sets  Document Outside HM   :    Plan:   Lumbar degenerative disc disease    Lumbar radiculitis    Lumbar spondylosis    Chronic back pain greater than 3 months duration       She is being scheduled for bilateral sacroiliac joint injections today to  help with her sacroiliitis.  The plan was discussed with the patient and she wishes to proceed.    Problem List Items Addressed This Visit       Chronic back pain greater than 3 months duration    Lumbar degenerative disc disease - Primary    Lumbar spondylosis    Lumbar radiculitis         No future appointments.       There are no Patient Instructions on file for this visit.  No follow-ups on file.     Signature:  Erin Mock MD      Date of encounter: 4/2/24

## 2024-04-10 RX ORDER — CYCLOBENZAPRINE HCL 10 MG
10 TABLET ORAL 3 TIMES DAILY
COMMUNITY

## 2024-04-10 RX ORDER — AMOXICILLIN 500 MG
1 CAPSULE ORAL 2 TIMES DAILY
COMMUNITY

## 2024-04-16 ENCOUNTER — ANESTHESIA EVENT (OUTPATIENT)
Dept: SURGERY | Facility: HOSPITAL | Age: 59
End: 2024-04-16
Payer: MEDICARE

## 2024-04-24 ENCOUNTER — ANESTHESIA (OUTPATIENT)
Dept: SURGERY | Facility: HOSPITAL | Age: 59
End: 2024-04-24
Payer: MEDICARE

## 2024-05-10 ENCOUNTER — OFFICE VISIT (OUTPATIENT)
Dept: PAIN MEDICINE | Facility: CLINIC | Age: 59
End: 2024-05-10
Payer: MEDICARE

## 2024-05-10 VITALS
DIASTOLIC BLOOD PRESSURE: 85 MMHG | TEMPERATURE: 98 F | BODY MASS INDEX: 44.16 KG/M2 | SYSTOLIC BLOOD PRESSURE: 141 MMHG | WEIGHT: 240 LBS | HEIGHT: 62 IN | HEART RATE: 71 BPM

## 2024-05-10 DIAGNOSIS — G89.29 CHRONIC BACK PAIN GREATER THAN 3 MONTHS DURATION: ICD-10-CM

## 2024-05-10 DIAGNOSIS — M54.2 CHRONIC NECK PAIN: ICD-10-CM

## 2024-05-10 DIAGNOSIS — G89.29 CHRONIC NECK PAIN: ICD-10-CM

## 2024-05-10 DIAGNOSIS — M46.1 SACROILIITIS: ICD-10-CM

## 2024-05-10 DIAGNOSIS — M50.30 DDD (DEGENERATIVE DISC DISEASE), CERVICAL: Primary | ICD-10-CM

## 2024-05-10 DIAGNOSIS — M54.9 CHRONIC BACK PAIN GREATER THAN 3 MONTHS DURATION: ICD-10-CM

## 2024-05-10 PROCEDURE — 99213 OFFICE O/P EST LOW 20 MIN: CPT | Mod: ,,, | Performed by: ANESTHESIOLOGY

## 2024-05-10 PROCEDURE — 3077F SYST BP >= 140 MM HG: CPT | Mod: CPTII,,, | Performed by: ANESTHESIOLOGY

## 2024-05-10 PROCEDURE — 3008F BODY MASS INDEX DOCD: CPT | Mod: CPTII,,, | Performed by: ANESTHESIOLOGY

## 2024-05-10 PROCEDURE — 3079F DIAST BP 80-89 MM HG: CPT | Mod: CPTII,,, | Performed by: ANESTHESIOLOGY

## 2024-05-10 PROCEDURE — 1160F RVW MEDS BY RX/DR IN RCRD: CPT | Mod: CPTII,,, | Performed by: ANESTHESIOLOGY

## 2024-05-10 PROCEDURE — 1159F MED LIST DOCD IN RCRD: CPT | Mod: CPTII,,, | Performed by: ANESTHESIOLOGY

## 2024-05-10 RX ORDER — VENLAFAXINE HYDROCHLORIDE 37.5 MG/1
37.5 CAPSULE, EXTENDED RELEASE ORAL
COMMUNITY
Start: 2024-05-03

## 2024-05-10 RX ORDER — METHOCARBAMOL 750 MG/1
750 TABLET, FILM COATED ORAL EVERY 8 HOURS PRN
COMMUNITY
Start: 2024-05-06

## 2024-05-10 RX ORDER — METHYLPREDNISOLONE 4 MG/1
TABLET ORAL
COMMUNITY
Start: 2024-05-06

## 2024-05-10 NOTE — H&P (VIEW-ONLY)
Erin Mock MD        PATIENT NAME: Adrianna Childers  : 1965  DATE: 5/10/24  MRN: [id number]      Billing Provider: Erin Mock MD  Level of Service:   Patient PCP Information       Provider PCP Type    Juan Pablo Boo MD General            Reason for Visit / Chief Complaint: Back Pain (Pre-op Bilateral SI Joint Injection 24, no P.T., OTC and RX medication for relief, pain level 10/10 when moving.)       Update PCP  Update Chief Complaint         History of Present Illness / Problem Focused Workflow     Adrianna Childers presents to the clinic with Back Pain (Pre-op Bilateral SI Joint Injection 24, no P.T., OTC and RX medication for relief, pain level 10/10 when moving.)     This is a 58-year-old female who returns to clinic today for follow up of her chronic low back pain.  She had a [employer] spinal cord stimulator implanted in February with Dr. Martino.  She states that it does help with her leg pain, however she is still complaining of low back pain as well as neck pain.  She sees Dr. Ha and he prescribed Norco, but states that the dose has been weaned and she is now having increased back pain.  She is scheduled for bilateral sacroiliac joint injections in a couple of weeks.  She would also like to go ahead and get scheduled for an injection for her neck pain.    Neck Pain     Back Pain    Pain  Associated symptoms include neck pain.       Review of Systems     Review of Systems   Musculoskeletal:  Positive for back pain, neck pain and neck stiffness.   Psychiatric/Behavioral:  Positive for sleep disturbance.    All other systems reviewed and are negative.       Medical / Social / Family History     Past Medical History:   Diagnosis Date    Anxiety     Arthritis     Depression     Digestive disorder     Insomnia     Seasonal allergies     Thyroid disease        Past Surgical History:   Procedure Laterality Date     SECTION      x 2    COLONOSCOPY      EPIDURAL  STEROID INJECTION INTO CERVICAL SPINE N/A 8/7/2023    Procedure: INJECTION, STEROID, SPINE, CERVICAL, EPIDURAL C7 T1;  Surgeon: Erin Mock MD;  Location: [facility] OR;  Service: Pain Management;  Laterality: N/A;  Cervical Steroid Injection C7-T1    JOINT REPLACEMENT Right     total knee    TRANSFORAMINAL EPIDURAL INJECTION OF STEROID Bilateral 6/7/2023    Procedure: Injection,steroid,epidural,transforaminal approach;  Surgeon: Erin Mock MD;  Location: [facility] OR;  Service: Pain Management;  Laterality: Bilateral;  TFESI Left L3, Right L4    TRIAL OF SPINAL CORD NERVE STIMULATOR N/A 10/16/2023    Procedure: TRIAL, NEUROSTIMULATOR, SPINAL CORD;  Surgeon: Erin Mock MD;  Location: [facility] OR;  Service: Pain Management;  Laterality: N/A;  Spincal cord stimulator trail    TUBAL LIGATION         Social History  Ms. Childers  reports that she has quit smoking. Her smoking use included cigarettes. She has never used smokeless tobacco. She reports that she does not currently use alcohol. She reports that she does not use drugs.    Family History  Ms.'s Childers family history includes Heart attack in her mother; Seizures in her father; Stroke in her father.    Medications and Allergies     Medications  Outpatient Medications Marked as Taking for the 5/10/24 encounter (Office Visit) with Erin Mock MD   Medication Sig Dispense Refill    acetaminophen (TYLENOL) 650 MG TbSR Take 650 mg by mouth every 8 (eight) hours as needed.      acetaminophen-codeine 300-60mg (TYLENOL #4) 300-60 mg Tab Take 1 tablet by mouth 3 (three) times daily.      ASHWAGANDHA EXTRACT ORAL Take 1 tablet by mouth Daily.      aspirin 81 mg Cap Take 81 mg by mouth once daily. Will stop on the 7/28 /23      chlorzoxazone (PARAFON FORTE) 500 mg Tab Take 1 tablet (500 mg total) by mouth 2 (two) times daily as needed (muscle spasms). 60 tablet 0    cholecalciferol, vitamin D3, 125 mcg (5,000 unit) Tab Take 5,000 Units by mouth nightly.       cyclobenzaprine (FLEXERIL) 10 MG tablet Take 10 mg by mouth 3 (three) times daily.      diphenhydrAMINE (BENADRYL) 25 mg capsule Take 25 mg by mouth nightly.      fluticasone propionate (FLONASE) 50 mcg/actuation nasal spray 2 sprays by Each Nostril route as needed.      HYDROcodone-acetaminophen (NORCO) 5-325 mg per tablet Take 1 tablet by mouth 3 (three) times daily.      ibuprofen (ADVIL,MOTRIN) 800 MG tablet Take 800 mg by mouth 3 (three) times daily.      levocetirizine (XYZAL) 5 MG tablet Take 5 mg by mouth Daily.      levothyroxine (SYNTHROID) 25 MCG tablet Take 25 mcg by mouth every morning.      LINZESS 290 mcg Cap capsule Take 290 mcg by mouth every morning.      loratadine (CLARITIN) 10 mg tablet Take 10 mg by mouth once daily.      MAGNESIUM CHLORIDE ORAL Take 1 tablet by mouth nightly.      meloxicam (MOBIC) 15 MG tablet Take 15 mg by mouth once daily.      methocarbamoL (ROBAXIN) 750 MG Tab Take 750 mg by mouth every 8 (eight) hours as needed.      methylPREDNISolone (MEDROL DOSEPACK) 4 mg tablet FOLLOW PACKAGE DIRECTIONS      multivitamin (THERAGRAN) per tablet Take 1 tablet by mouth once daily.      omega-3 fatty acids/fish oil (FISH OIL-OMEGA-3 FATTY ACIDS) 300-1,000 mg capsule Take 1 capsule by mouth 2 (two) times a day.      pantoprazole (PROTONIX) 40 MG tablet Take 40 mg by mouth.      potassium bicarbonate (K-LYTE) disintegrating tablet Take 99 mEq by mouth nightly.      potassium chloride (MICRO-K) 10 MEQ CpSR Take 10 mEq by mouth once daily.      pregabalin (LYRICA) 100 MG capsule Take 100 mg by mouth 3 (three) times daily.      senna (SENOKOT) 8.6 mg tablet Take 17.2 mg by mouth once daily.      tiZANidine (ZANAFLEX) 2 MG tablet Take 2 mg by mouth every evening.      traZODone (DESYREL) 100 MG tablet Take 100 mg by mouth every evening.      venlafaxine (EFFEXOR) 37.5 MG Tab Take 37.5 mg by mouth 2 (two) times daily.      venlafaxine (EFFEXOR-XR) 37.5 MG 24 hr capsule Take 37.5 mg by mouth.       zolpidem (AMBIEN) 10 mg Tab Take 10 mg by mouth every evening.         Allergies  Review of patient's allergies indicates:  No Known Allergies    Physical Examination     Vitals:    05/10/24 0932   BP: (!) 141/85   Pulse: 71   Temp: 98 °F (36.7 °C)       Spine Musculoskeletal Exam    Gait    Gait is normal.    Inspection    Cervical Spine    Cervical spine inspection is normal.    Thoracolumbar        Prior incision: midline thoracic    Incision: clean, dry, intact and well-healed    Incisional drainage: none    Palpation    Cervical Spine    Right      Masses: none      Spasms: none      Crepitus: none      Muscle tone: normal    Left      Masses: none      Spasms: none      Crepitus: none      Muscle tone: normal    Thoracolumbar    Tenderness: present      SI Joint: right and left    Range of Motion    Cervical Spine        Cervical spine range of motion additional comments: Limited range of motion in the cervical spine secondary to pain    Thoracolumbar        Thoracolumbar range of motion additional comments: Limited ROM in lumbar spine due to pain.    Strength    Cervical Spine    Cervical spine motor exam is normal.    Thoracolumbar    Thoracolumbar motor exam is normal.       Sensory    Cervical Spine    Cervical spine sensation is normal.    Thoracolumbar    Thoracolumbar sensation is normal.    Special Tests    Thoracolumbar      Right      BRITTNEY test: positive      Gaenslen's: positive      Left      BRITTNEY test: positive      Gaenslen's: positive    General      Constitutional: appears stated age, well-developed and well-nourished    Scleral icterus: no    Labored breathing: no    Psychiatric: normal mood and affect and no acute distress    Neurological: alert and oriented x3    Skin: intact    Lymphadenopathy: none       Assessment and Plan (including Health Maintenance)      Problem List  Smart Sets  Document Outside HM   :    Plan:   DDD (degenerative disc disease), cervical    Chronic back pain  greater than 3 months duration    Sacroiliitis    Chronic neck pain       She is scheduled for bilateral sacroiliac joint injections in a couple of weeks.  The plan was discussed with the patient and she wishes to proceed.  She would also like to be scheduled for an injection to help with her neck pain.  She is being scheduled for a cervical epidural steroid injection (C7-T1) to help with the neck pain radiating into the upper extremities, consistent with findings of degenerative disc disease seen on her MRI of the cervical spine.    Problem List Items Addressed This Visit          Neuro    DDD (degenerative disc disease), cervical - Primary       Orthopedic    Chronic neck pain    Chronic back pain greater than 3 months duration    Sacroiliitis         No future appointments.       There are no Patient Instructions on file for this visit.  No follow-ups on file.     Signature:  Erin Mock MD      Date of encounter: 5/10/24

## 2024-05-10 NOTE — PROGRESS NOTES
Erin Mock MD        PATIENT NAME: Adrianna Childers  : 1965  DATE: 5/10/24  MRN: [id number]      Billing Provider: Erin Mock MD  Level of Service:   Patient PCP Information       Provider PCP Type    Juan Pablo Boo MD General            Reason for Visit / Chief Complaint: Back Pain (Pre-op Bilateral SI Joint Injection 24, no P.T., OTC and RX medication for relief, pain level 10/10 when moving.)       Update PCP  Update Chief Complaint         History of Present Illness / Problem Focused Workflow     Adrianna Childers presents to the clinic with Back Pain (Pre-op Bilateral SI Joint Injection 24, no P.T., OTC and RX medication for relief, pain level 10/10 when moving.)     This is a 58-year-old female who returns to clinic today for follow up of her chronic low back pain.  She had a [employer] spinal cord stimulator implanted in February with Dr. Martino.  She states that it does help with her leg pain, however she is still complaining of low back pain as well as neck pain.  She sees Dr. Ha and he prescribed Norco, but states that the dose has been weaned and she is now having increased back pain.  She is scheduled for bilateral sacroiliac joint injections in a couple of weeks.  She would also like to go ahead and get scheduled for an injection for her neck pain.    Neck Pain     Back Pain    Pain  Associated symptoms include neck pain.       Review of Systems     Review of Systems   Musculoskeletal:  Positive for back pain, neck pain and neck stiffness.   Psychiatric/Behavioral:  Positive for sleep disturbance.    All other systems reviewed and are negative.       Medical / Social / Family History     Past Medical History:   Diagnosis Date    Anxiety     Arthritis     Depression     Digestive disorder     Insomnia     Seasonal allergies     Thyroid disease        Past Surgical History:   Procedure Laterality Date     SECTION      x 2    COLONOSCOPY      EPIDURAL  STEROID INJECTION INTO CERVICAL SPINE N/A 8/7/2023    Procedure: INJECTION, STEROID, SPINE, CERVICAL, EPIDURAL C7 T1;  Surgeon: Erin Mock MD;  Location: [facility] OR;  Service: Pain Management;  Laterality: N/A;  Cervical Steroid Injection C7-T1    JOINT REPLACEMENT Right     total knee    TRANSFORAMINAL EPIDURAL INJECTION OF STEROID Bilateral 6/7/2023    Procedure: Injection,steroid,epidural,transforaminal approach;  Surgeon: Erin Mock MD;  Location: [facility] OR;  Service: Pain Management;  Laterality: Bilateral;  TFESI Left L3, Right L4    TRIAL OF SPINAL CORD NERVE STIMULATOR N/A 10/16/2023    Procedure: TRIAL, NEUROSTIMULATOR, SPINAL CORD;  Surgeon: rEin Mock MD;  Location: [facility] OR;  Service: Pain Management;  Laterality: N/A;  Spincal cord stimulator trail    TUBAL LIGATION         Social History  Ms. Childers  reports that she has quit smoking. Her smoking use included cigarettes. She has never used smokeless tobacco. She reports that she does not currently use alcohol. She reports that she does not use drugs.    Family History  Ms.'s Childers family history includes Heart attack in her mother; Seizures in her father; Stroke in her father.    Medications and Allergies     Medications  Outpatient Medications Marked as Taking for the 5/10/24 encounter (Office Visit) with Erin Mock MD   Medication Sig Dispense Refill    acetaminophen (TYLENOL) 650 MG TbSR Take 650 mg by mouth every 8 (eight) hours as needed.      acetaminophen-codeine 300-60mg (TYLENOL #4) 300-60 mg Tab Take 1 tablet by mouth 3 (three) times daily.      ASHWAGANDHA EXTRACT ORAL Take 1 tablet by mouth Daily.      aspirin 81 mg Cap Take 81 mg by mouth once daily. Will stop on the 7/28 /23      chlorzoxazone (PARAFON FORTE) 500 mg Tab Take 1 tablet (500 mg total) by mouth 2 (two) times daily as needed (muscle spasms). 60 tablet 0    cholecalciferol, vitamin D3, 125 mcg (5,000 unit) Tab Take 5,000 Units by mouth nightly.       cyclobenzaprine (FLEXERIL) 10 MG tablet Take 10 mg by mouth 3 (three) times daily.      diphenhydrAMINE (BENADRYL) 25 mg capsule Take 25 mg by mouth nightly.      fluticasone propionate (FLONASE) 50 mcg/actuation nasal spray 2 sprays by Each Nostril route as needed.      HYDROcodone-acetaminophen (NORCO) 5-325 mg per tablet Take 1 tablet by mouth 3 (three) times daily.      ibuprofen (ADVIL,MOTRIN) 800 MG tablet Take 800 mg by mouth 3 (three) times daily.      levocetirizine (XYZAL) 5 MG tablet Take 5 mg by mouth Daily.      levothyroxine (SYNTHROID) 25 MCG tablet Take 25 mcg by mouth every morning.      LINZESS 290 mcg Cap capsule Take 290 mcg by mouth every morning.      loratadine (CLARITIN) 10 mg tablet Take 10 mg by mouth once daily.      MAGNESIUM CHLORIDE ORAL Take 1 tablet by mouth nightly.      meloxicam (MOBIC) 15 MG tablet Take 15 mg by mouth once daily.      methocarbamoL (ROBAXIN) 750 MG Tab Take 750 mg by mouth every 8 (eight) hours as needed.      methylPREDNISolone (MEDROL DOSEPACK) 4 mg tablet FOLLOW PACKAGE DIRECTIONS      multivitamin (THERAGRAN) per tablet Take 1 tablet by mouth once daily.      omega-3 fatty acids/fish oil (FISH OIL-OMEGA-3 FATTY ACIDS) 300-1,000 mg capsule Take 1 capsule by mouth 2 (two) times a day.      pantoprazole (PROTONIX) 40 MG tablet Take 40 mg by mouth.      potassium bicarbonate (K-LYTE) disintegrating tablet Take 99 mEq by mouth nightly.      potassium chloride (MICRO-K) 10 MEQ CpSR Take 10 mEq by mouth once daily.      pregabalin (LYRICA) 100 MG capsule Take 100 mg by mouth 3 (three) times daily.      senna (SENOKOT) 8.6 mg tablet Take 17.2 mg by mouth once daily.      tiZANidine (ZANAFLEX) 2 MG tablet Take 2 mg by mouth every evening.      traZODone (DESYREL) 100 MG tablet Take 100 mg by mouth every evening.      venlafaxine (EFFEXOR) 37.5 MG Tab Take 37.5 mg by mouth 2 (two) times daily.      venlafaxine (EFFEXOR-XR) 37.5 MG 24 hr capsule Take 37.5 mg by mouth.       zolpidem (AMBIEN) 10 mg Tab Take 10 mg by mouth every evening.         Allergies  Review of patient's allergies indicates:  No Known Allergies    Physical Examination     Vitals:    05/10/24 0932   BP: (!) 141/85   Pulse: 71   Temp: 98 °F (36.7 °C)       Spine Musculoskeletal Exam    Gait    Gait is normal.    Inspection    Cervical Spine    Cervical spine inspection is normal.    Thoracolumbar        Prior incision: midline thoracic    Incision: clean, dry, intact and well-healed    Incisional drainage: none    Palpation    Cervical Spine    Right      Masses: none      Spasms: none      Crepitus: none      Muscle tone: normal    Left      Masses: none      Spasms: none      Crepitus: none      Muscle tone: normal    Thoracolumbar    Tenderness: present      SI Joint: right and left    Range of Motion    Cervical Spine        Cervical spine range of motion additional comments: Limited range of motion in the cervical spine secondary to pain    Thoracolumbar        Thoracolumbar range of motion additional comments: Limited ROM in lumbar spine due to pain.    Strength    Cervical Spine    Cervical spine motor exam is normal.    Thoracolumbar    Thoracolumbar motor exam is normal.       Sensory    Cervical Spine    Cervical spine sensation is normal.    Thoracolumbar    Thoracolumbar sensation is normal.    Special Tests    Thoracolumbar      Right      BRITTNEY test: positive      Gaenslen's: positive      Left      BRITTNEY test: positive      Gaenslen's: positive    General      Constitutional: appears stated age, well-developed and well-nourished    Scleral icterus: no    Labored breathing: no    Psychiatric: normal mood and affect and no acute distress    Neurological: alert and oriented x3    Skin: intact    Lymphadenopathy: none       Assessment and Plan (including Health Maintenance)      Problem List  Smart Sets  Document Outside HM   :    Plan:   DDD (degenerative disc disease), cervical    Chronic back pain  greater than 3 months duration    Sacroiliitis    Chronic neck pain       She is scheduled for bilateral sacroiliac joint injections in a couple of weeks.  The plan was discussed with the patient and she wishes to proceed.  She would also like to be scheduled for an injection to help with her neck pain.  She is being scheduled for a cervical epidural steroid injection (C7-T1) to help with the neck pain radiating into the upper extremities, consistent with findings of degenerative disc disease seen on her MRI of the cervical spine.    Problem List Items Addressed This Visit          Neuro    DDD (degenerative disc disease), cervical - Primary       Orthopedic    Chronic neck pain    Chronic back pain greater than 3 months duration    Sacroiliitis         No future appointments.       There are no Patient Instructions on file for this visit.  No follow-ups on file.     Signature:  Erin Mock MD      Date of encounter: 5/10/24

## 2024-05-20 ENCOUNTER — HOSPITAL ENCOUNTER (OUTPATIENT)
Facility: HOSPITAL | Age: 59
Discharge: HOME | End: 2024-05-20
Attending: ANESTHESIOLOGY | Admitting: ANESTHESIOLOGY
Payer: MEDICARE

## 2024-05-20 DIAGNOSIS — M46.1 SACROILIITIS: ICD-10-CM

## 2024-05-20 PROBLEM — E66.01 MORBID OBESITY WITH BMI OF 40.0-44.9, ADULT: Status: ACTIVE | Noted: 2024-05-20

## 2024-05-20 PROCEDURE — 25000003 PHARM REV CODE 250: Performed by: NURSE ANESTHETIST, CERTIFIED REGISTERED

## 2024-05-20 PROCEDURE — 27096 INJECT SACROILIAC JOINT: CPT | Mod: 50 | Performed by: ANESTHESIOLOGY

## 2024-05-20 PROCEDURE — 25000003 PHARM REV CODE 250: Performed by: ANESTHESIOLOGY

## 2024-05-20 PROCEDURE — 63600175 PHARM REV CODE 636 W HCPCS: Performed by: ANESTHESIOLOGY

## 2024-05-20 PROCEDURE — 27096 INJECT SACROILIAC JOINT: CPT | Mod: 50,,, | Performed by: ANESTHESIOLOGY

## 2024-05-20 PROCEDURE — 37000008 HC ANESTHESIA 1ST 15 MINUTES: Performed by: ANESTHESIOLOGY

## 2024-05-20 PROCEDURE — D9220A PRA ANESTHESIA: Mod: ,,, | Performed by: NURSE ANESTHETIST, CERTIFIED REGISTERED

## 2024-05-20 PROCEDURE — 63600175 PHARM REV CODE 636 W HCPCS: Performed by: NURSE ANESTHETIST, CERTIFIED REGISTERED

## 2024-05-20 RX ORDER — LIDOCAINE HYDROCHLORIDE 10 MG/ML
INJECTION, SOLUTION EPIDURAL; INFILTRATION; INTRACAUDAL; PERINEURAL
Status: DISCONTINUED | OUTPATIENT
Start: 2024-05-20 | End: 2024-05-20

## 2024-05-20 RX ORDER — TRIAMCINOLONE ACETONIDE 40 MG/ML
INJECTION, SUSPENSION INTRA-ARTICULAR; INTRAMUSCULAR
Status: DISCONTINUED
Start: 2024-05-20 | End: 2024-05-20 | Stop reason: HOSPADM

## 2024-05-20 RX ORDER — TRIAMCINOLONE ACETONIDE 40 MG/ML
INJECTION, SUSPENSION INTRA-ARTICULAR; INTRAMUSCULAR
Status: DISCONTINUED
Start: 2024-05-20 | End: 2024-05-20 | Stop reason: WASHOUT

## 2024-05-20 RX ORDER — SODIUM CHLORIDE, SODIUM GLUCONATE, SODIUM ACETATE, POTASSIUM CHLORIDE AND MAGNESIUM CHLORIDE 30; 37; 368; 526; 502 MG/100ML; MG/100ML; MG/100ML; MG/100ML; MG/100ML
INJECTION, SOLUTION INTRAVENOUS CONTINUOUS
Status: CANCELLED | OUTPATIENT
Start: 2024-05-20 | End: 2024-06-19

## 2024-05-20 RX ORDER — LIDOCAINE HYDROCHLORIDE 10 MG/ML
INJECTION, SOLUTION EPIDURAL; INFILTRATION; INTRACAUDAL; PERINEURAL
Status: DISCONTINUED | OUTPATIENT
Start: 2024-05-20 | End: 2024-05-20 | Stop reason: HOSPADM

## 2024-05-20 RX ORDER — SODIUM CHLORIDE, SODIUM GLUCONATE, SODIUM ACETATE, POTASSIUM CHLORIDE AND MAGNESIUM CHLORIDE 30; 37; 368; 526; 502 MG/100ML; MG/100ML; MG/100ML; MG/100ML; MG/100ML
INJECTION, SOLUTION INTRAVENOUS CONTINUOUS
Status: DISCONTINUED | OUTPATIENT
Start: 2024-05-20 | End: 2024-05-20 | Stop reason: HOSPADM

## 2024-05-20 RX ORDER — BUPIVACAINE HYDROCHLORIDE 2.5 MG/ML
INJECTION, SOLUTION EPIDURAL; INFILTRATION; INTRACAUDAL
Status: DISCONTINUED | OUTPATIENT
Start: 2024-05-20 | End: 2024-05-20 | Stop reason: HOSPADM

## 2024-05-20 RX ORDER — PROPOFOL 10 MG/ML
VIAL (ML) INTRAVENOUS
Status: DISCONTINUED | OUTPATIENT
Start: 2024-05-20 | End: 2024-05-20

## 2024-05-20 RX ORDER — SODIUM CHLORIDE, SODIUM LACTATE, POTASSIUM CHLORIDE, CALCIUM CHLORIDE 600; 310; 30; 20 MG/100ML; MG/100ML; MG/100ML; MG/100ML
INJECTION, SOLUTION INTRAVENOUS CONTINUOUS
Status: DISCONTINUED | OUTPATIENT
Start: 2024-05-20 | End: 2024-05-20 | Stop reason: HOSPADM

## 2024-05-20 RX ORDER — ONDANSETRON HYDROCHLORIDE 2 MG/ML
4 INJECTION, SOLUTION INTRAVENOUS DAILY PRN
Status: CANCELLED | OUTPATIENT
Start: 2024-05-20

## 2024-05-20 RX ORDER — LIDOCAINE HYDROCHLORIDE 10 MG/ML
1 INJECTION, SOLUTION EPIDURAL; INFILTRATION; INTRACAUDAL; PERINEURAL ONCE AS NEEDED
Status: DISCONTINUED | OUTPATIENT
Start: 2024-05-20 | End: 2024-05-20 | Stop reason: HOSPADM

## 2024-05-20 RX ORDER — SODIUM CHLORIDE 9 MG/ML
INJECTION, SOLUTION INTRAVENOUS CONTINUOUS
Status: DISCONTINUED | OUTPATIENT
Start: 2024-05-20 | End: 2024-05-20 | Stop reason: HOSPADM

## 2024-05-20 RX ORDER — BUPIVACAINE HYDROCHLORIDE 2.5 MG/ML
INJECTION, SOLUTION EPIDURAL; INFILTRATION; INTRACAUDAL
Status: DISCONTINUED
Start: 2024-05-20 | End: 2024-05-20 | Stop reason: WASHOUT

## 2024-05-20 RX ORDER — TRIAMCINOLONE ACETONIDE 40 MG/ML
INJECTION, SUSPENSION INTRA-ARTICULAR; INTRAMUSCULAR
Status: DISCONTINUED | OUTPATIENT
Start: 2024-05-20 | End: 2024-05-20 | Stop reason: HOSPADM

## 2024-05-20 RX ORDER — LIDOCAINE HYDROCHLORIDE 10 MG/ML
INJECTION, SOLUTION EPIDURAL; INFILTRATION; INTRACAUDAL; PERINEURAL
Status: DISCONTINUED
Start: 2024-05-20 | End: 2024-05-20 | Stop reason: HOSPADM

## 2024-05-20 RX ADMIN — PROPOFOL 75 MG: 10 INJECTION, EMULSION INTRAVENOUS at 11:05

## 2024-05-20 RX ADMIN — LIDOCAINE HYDROCHLORIDE 50 MG: 10 INJECTION, SOLUTION EPIDURAL; INFILTRATION; INTRACAUDAL; PERINEURAL at 11:05

## 2024-05-20 NOTE — PLAN OF CARE
Vitals signs stable. Pain and nausea well controlled. Discharge criteria met.  Discharged via wheelchair to private auto accompanied by family member.    Problem: Pain Acute  Goal: Optimal Pain Control and Function  Outcome: Met     Problem: Fall Injury Risk  Goal: Absence of Fall and Fall-Related Injury  Outcome: Met     Problem: Infection  Goal: Absence of Infection Signs and Symptoms  Outcome: Met     Problem: Adult Inpatient Plan of Care  Goal: Plan of Care Review  Outcome: Met  Goal: Patient-Specific Goal (Individualized)  Outcome: Met  Goal: Absence of Hospital-Acquired Illness or Injury  Outcome: Met  Goal: Optimal Comfort and Wellbeing  Outcome: Met  Goal: Readiness for Transition of Care  Outcome: Met     Problem: Anxiety Signs/Symptoms  Goal: Optimized Energy Level (Anxiety Signs/Symptoms)  Outcome: Met  Goal: Optimized Cognitive Function (Anxiety Signs/Symptoms)  Outcome: Met  Goal: Improved Mood Symptoms (Anxiety Signs/Symptoms)  Outcome: Met  Goal: Improved Sleep (Anxiety Signs/Symptoms)  Outcome: Met  Goal: Enhanced Social, Occupational or Functional Skills (Anxiety Signs/Symptoms)  Outcome: Met  Goal: Improved Somatic Symptoms (Anxiety Signs/Symptoms)  Outcome: Met     Problem: Depressive Signs/Symptoms  Goal: Optimized Energy Level (Depressive Signs/Symptoms)  Outcome: Met  Goal: Optimized Cognitive Function (Depressive Signs/Symptoms)  Outcome: Met  Goal: Increased Participation and Engagement (Depressive Signs/Symptoms)  Outcome: Met  Goal: Enhanced Self-Esteem and Confidence (Depressive Signs/Symptoms)  Outcome: Met  Goal: Improved Mood Symptoms (Depressive Signs/Symptoms)  Outcome: Met  Goal: Optimized Nutrition Intake (Depressive Signs/Symptoms)  Outcome: Met  Goal: Improved Psychomotor Symptoms (Depressive Signs/Symptoms)  Outcome: Met  Goal: Improved Sleep (Depressive Signs/Symptoms)  Outcome: Met  Goal: Enhanced Social, Occupational or Functional Skills (Depressive Signs/Symptoms)  Outcome:  Met     Problem: Bariatric Environmental Safety  Goal: Safety Maintained with Care  Outcome: Met     Problem: Wound  Goal: Optimal Coping  Outcome: Met  Goal: Optimal Functional Ability  Outcome: Met  Goal: Absence of Infection Signs and Symptoms  Outcome: Met  Goal: Improved Oral Intake  Outcome: Met  Goal: Optimal Pain Control and Function  Outcome: Met  Goal: Skin Health and Integrity  Outcome: Met  Goal: Optimal Wound Healing  Outcome: Met

## 2024-05-20 NOTE — DISCHARGE SUMMARY
[facility] Surgical - Periop Services  Discharge Note  Short Stay    Procedure(s) (LRB):  INJECTION,SACROILIAC JOINT Bilateral (Bilateral)      OUTCOME: Patient tolerated treatment/procedure well without complication and is now ready for discharge.    DISPOSITION: Home or Self Care    FINAL DIAGNOSIS:  <principal problem not specified>    FOLLOWUP: In clinic    DISCHARGE INSTRUCTIONS:  No discharge procedures on file.     TIME SPENT ON DISCHARGE: 5 minutes

## 2024-05-20 NOTE — ANESTHESIA PREPROCEDURE EVALUATION
2024  Adrianna Childers is a 58 y.o., female with   -------------------------------------    Anxiety    Arthritis    Chronic back pain    Depression    Digestive disorder    Insomnia    Seasonal allergies    Thyroid disease       And   ----------------------------     section    x 2    Colonoscopy    Epidural steroid injection into cervical spine    Procedure: INJECTION, STEROID, SPINE, CERVICAL, EPIDURAL C7 T1;  Surgeon: Erin Mock MD;  Location: [facility] OR;  Service: Pain Management;  Laterality: N/A;  Cervical Steroid Injection C7-T1    Joint replacement    total knee    Transforaminal epidural injection of steroid    Procedure: Injection,steroid,epidural,transforaminal approach;  Surgeon: Erin Mock MD;  Location: [facility] OR;  Service: Pain Management;  Laterality: Bilateral;  TFESI Left L3, Right L4    Trial of spinal cord nerve stimulator    Procedure: TRIAL, NEUROSTIMULATOR, SPINAL CORD;  Surgeon: Erin Mock MD;  Location: [facility] OR;  Service: Pain Management;  Laterality: N/A;  Spincal cord stimulator trail    Tubal ligation       Presents for bilateral SI joint injection       Pre-op Assessment    I have reviewed the NPO Status.      Review of Systems  Musculoskeletal:  Arthritis        Arthritis          Neurological:    Neuromuscular Disease,           Arthritis                         Neuromuscular Disease   Psych:  Psychiatric History                  Physical Exam  General: Well nourished, Cooperative, Alert and Oriented    Airway:  Mallampati: III   Mouth Opening: Normal  TM Distance: Normal  Tongue: Normal  Neck ROM: Normal ROM  Neck: Girth Increased    Dental:  Intact    Chest/Lungs:  Clear to auscultation, Normal Respiratory Rate    Heart:  Rate: Normal  Rhythm: Regular Rhythm        Anesthesia Plan  Type of Anesthesia, risks & benefits discussed:    Anesthesia  Type: Gen Natural Airway  Intra-op Monitoring Plan: Standard ASA Monitors  Post Op Pain Control Plan: IV/PO Opioids PRN  Induction:  IV  Airway Plan: Direct  Informed Consent: Informed consent signed with the Patient and all parties understand the risks and agree with anesthesia plan.  All questions answered. Patient consented to blood products? No  ASA Score: 3  Day of Surgery Review of History & Physical: H&P Update referred to the surgeon/provider.  Anesthesia Plan Notes: Nasal cannula vs facemask supplemental oxygenation   For patients with KARAN/obesity, may consider SuperNoval Nasal CPAP      Ready For Surgery From Anesthesia Perspective.     .

## 2024-05-20 NOTE — ANESTHESIA POSTPROCEDURE EVALUATION
Anesthesia Post Evaluation    Patient: Adrianna Childers    Procedure(s) Performed: Procedure(s) (LRB):  INJECTION,SACROILIAC JOINT Bilateral (Bilateral)    Final Anesthesia Type: MAC      Patient location during evaluation: OPS  Patient participation: Yes- Able to Participate  Level of consciousness: awake and alert  Post-procedure vital signs: reviewed and stable  Pain management: adequate  Airway patency: patent    PONV status at discharge: No PONV  Anesthetic complications: no      Cardiovascular status: blood pressure returned to baseline  Respiratory status: unassisted and room air  Hydration status: euvolemic  Follow-up not needed.              Vitals Value Taken Time   /75 05/20/24 1055   Temp 37 °C (98.6 °F) 05/20/24 1055   Pulse 74 05/20/24 1055   Resp 18 05/20/24 1149   SpO2 95 % 05/20/24 1055         No case tracking events are documented in the log.      Pain/Janice Score: No data recorded

## 2024-05-22 VITALS
TEMPERATURE: 99 F | BODY MASS INDEX: 44.22 KG/M2 | HEART RATE: 71 BPM | RESPIRATION RATE: 16 BRPM | DIASTOLIC BLOOD PRESSURE: 69 MMHG | OXYGEN SATURATION: 99 % | SYSTOLIC BLOOD PRESSURE: 112 MMHG | HEIGHT: 62 IN | WEIGHT: 240.31 LBS

## 2024-05-24 DIAGNOSIS — M54.12 RADICULOPATHY, CERVICAL REGION: ICD-10-CM

## 2024-05-24 DIAGNOSIS — M54.2 CHRONIC NECK PAIN: ICD-10-CM

## 2024-05-24 DIAGNOSIS — G89.29 CHRONIC NECK PAIN: ICD-10-CM

## 2024-05-24 DIAGNOSIS — M50.30 DDD (DEGENERATIVE DISC DISEASE), CERVICAL: Primary | ICD-10-CM

## 2024-05-24 DIAGNOSIS — M47.812 CERVICAL SPONDYLOSIS: ICD-10-CM

## 2024-06-03 ENCOUNTER — OFFICE VISIT (OUTPATIENT)
Dept: PAIN MEDICINE | Facility: CLINIC | Age: 59
End: 2024-06-03
Payer: MEDICARE

## 2024-06-03 VITALS
DIASTOLIC BLOOD PRESSURE: 69 MMHG | WEIGHT: 240 LBS | BODY MASS INDEX: 44.16 KG/M2 | HEART RATE: 80 BPM | SYSTOLIC BLOOD PRESSURE: 126 MMHG | HEIGHT: 62 IN

## 2024-06-03 DIAGNOSIS — M46.1 SACROILIITIS: Primary | ICD-10-CM

## 2024-06-03 DIAGNOSIS — M54.12 CERVICAL RADICULOPATHY: ICD-10-CM

## 2024-06-03 DIAGNOSIS — M54.2 CERVICALGIA: ICD-10-CM

## 2024-06-03 PROCEDURE — 3078F DIAST BP <80 MM HG: CPT | Mod: CPTII,,, | Performed by: NURSE PRACTITIONER

## 2024-06-03 PROCEDURE — 99214 OFFICE O/P EST MOD 30 MIN: CPT | Mod: ,,, | Performed by: NURSE PRACTITIONER

## 2024-06-03 PROCEDURE — 3008F BODY MASS INDEX DOCD: CPT | Mod: CPTII,,, | Performed by: NURSE PRACTITIONER

## 2024-06-03 PROCEDURE — 1159F MED LIST DOCD IN RCRD: CPT | Mod: CPTII,,, | Performed by: NURSE PRACTITIONER

## 2024-06-03 PROCEDURE — 3074F SYST BP LT 130 MM HG: CPT | Mod: CPTII,,, | Performed by: NURSE PRACTITIONER

## 2024-06-03 PROCEDURE — 1160F RVW MEDS BY RX/DR IN RCRD: CPT | Mod: CPTII,,, | Performed by: NURSE PRACTITIONER

## 2024-06-03 NOTE — PATIENT INSTRUCTIONS
Preop instructions for cervical epidural steroid:  hold Ibuprofen, Mobic. Omega 3/Fish oil 7 days prior to procedure and resume post.    Alternate treatments for arthritis in your spine (facet arthropathy)  You have arthritis in your facet joints of your low back. See treatment options below.     Consider future diagnostic medial branch blocks  (MBBs) (X 2 sets) and future Radio Frequency ablation to these facet joints housing arthritis. See educational information.     This MBB is a temporary anesthetic medication designed to block the burning/sharp pain caused from irritation to a spinal nerve root.   This anesthetic block will be injected in the joint where the nerve root is being irritated.   The anticipated pain relief from this MBB is temporary and designed to last up to a week.     Following your discharge, I would like you to test this block out by performing normal house hold chores or activities of daily living for a week  Pay attention if you have reduced pain and which activities were improved.   Insurance will typically expect an 80% or greater improvement with pain to approve a burning of the nerve, or Radiofrequency Ablation (RFA) to the same level.

## 2024-06-03 NOTE — PROGRESS NOTES
Subjective:      Patient ID: Adrianna Childers is a 58 y.o. female.    Chief Complaint: Post-op Evaluation (Post op Pedrito SI joint injection 5/202/24  pre op WINIFRED C7-T1 7/1/24 C/O pain level 0 sitting, walking/standing 8, states procedure helped, Taking Norco for pain. Pre op for WINIFRED C7-T1 .)    Referred by: No ref. provider found     HPI: This is a pleasant 58-year-old established female patient presenting as a postoperative follow-up for bilateral SI joint injections on 05/20/2024 and a preop visit for her pending cervical epidural steroid injection C7-T1 scheduled on 07/01/2024.  Receive a proximally 50% reduction to her bilateral buttock pain after completing her bilateral SI joint injections.  Over all she is very satisfied with this pain and states it has reduced with sitting, her sleeping has improved household chores she can do longer especially mopping and sweeping and she feels like she walks straighter and can work in her garden little longer.  However she has notable pain to her lumbar axial spine that is more pronounced with mopping and sweeping and standing too long will exacerbate this pain.  This will elevate to a 10/10 in the lumbar axial spine.  Her pain to bilateral buttocks averages anywhere from a 0 to a 4/10 and again is very manageable since she completed the bilateral SI joint injections.  She would also like to move forward with her cervical epidural steroid injection as she has ongoing neck pain  .  Her cervical MRI imaging is consistent with cervical degenerative disc disease and foraminal narrowing that has a pain generator.     She had a [employer] spinal cord stimulator implanted in February with Dr. Martino. She states that it does help with her leg pain, however she is still complaining of low back pain as well as neck pain.  Review of her lumbar MRI shows notable facet arthropathy throughout.  She sees Dr. Ha and he prescribed Norco, but states that the dose has been weaned  "and she is now having increased back pain.  She would also like to go ahead and proceed with her cervical epidural steroid injection at C7-T1 scheduled on 07/01/2024.  She is also interested in discussing future considerations of diagnostic lumbar medial branch blocks and possible radiofrequency ablation.  Now that she has a spinal cord stimulator if that is something she wants to move forward with she will need to turn the spinal cord stimulator off if she advanced his to the point where she would have a radiofrequency ablation.  She was also interested in teaching sheets about this last procedure.    Vital signs:   Vitals:    06/03/24 1033   BP: 126/69   Pulse: 80   Weight: 108.9 kg (240 lb)   Height: 5' 2" (1.575 m)     Body mass index is 43.9 kg/m².  Pain Disability Index (PDI): 39       Interventional Pain History  05/20/2024:  Bilateral SI joint injections  (pending) cervical epidural steroid injection C7-T1 scheduled on 07/01/2023    ROS: Bilateral buttock pain + neck pain    Cervical MRI 2024     The cervical vertebral body heights and alignment of the cervical vertebrae in the AP plane are well maintained.  No acute fractures or subluxations are identified.  There is no prevertebral soft tissue swelling.  There are hemangiomata at several levels, and there is heterogeneous marrow signal due to asymmetric marrow conversion.  No other non degenerative marrow signal changes are present.  The visualized cervical and upper thoracic spinal cord has normal signal and morphology. The visualized cervical and upper thoracic spinal cord signal is unremarkable.  The thyroid gland is partially obscured but could be enlarged, and thyroid nodules/lesions cannot be excluded.  There is fluid in the posterior nasopharynx versus oropharynx.     At C2-C3, there is a similar minimal broad disc bulge-marginal osteophyte complex.  The canal and foramina are patent.  Minimal broad disc bulge     At C3-C4, there is a similar mild " broad disc bulge-marginal osteophyte complex.  The canal and foramina are widely patent.     At C4-C5, there is decreased disc height with similar appearing broad disc bulge-marginal osteophyte complex slightly asymmetric to the right that contacts the ventral cord.  Although there is also some ligamentum flavum hypertrophy, there is no canal stenosis.  Uncovertebral hypertrophy and facet arthropathy are causing similar at least moderate or greater bilateral foraminal stenosis.     At C5-C6, there is some interval desiccation of the previously seen right paracentral disc bulge now with disc bulge-marginal osteophyte complex the most prominent right paracentral canal.  There is no canal stenosis.  Uncovertebral hypertrophy and facet arthropathy are causing similar at least moderate or greater left and mild or greater right foraminal stenosis.     At C6-C7, there is interval desiccation of the previously seen broad disc protrusion-marginal osteophyte complex extending across the ventral canal but more prominent on the right.  There is no significant central canal stenosis even given some ligamentum flavum hypertrophy.  There are at least mild to moderate or greater left and moderate or greater right foraminal stenosis.     At C7-T1, there is similar appearance of the broad disc bulge asymmetric to the left that with prominent ligamentum flavum hypertrophy is causing no canal stenosis.  There are at least mild to moderate or greater left and milder greater right foraminal stenosis.     Impression:        Changes from cervical spondylosis are mostly not simply changed in the previous exam, but there is interval decrease in the previously seen right paracentral disc bulge at C5-C6 and the broad disc protrusion-marginal osteophyte complex extending across the ventral canal more prominent a right at C6-C7.  There is no significant cervical spinal canal stenosis, but there are multilevel bilateral foraminal stenoses, as  above.        Electronically signed by:Nando Bates MD  Date:                                            03/29/2023        Objective:          Physical Exam    General: Well developed; overweight; A&O x 3; No anxiety/depression; NAD  Mental Status: Oriented to person, palce and time. Displays appropriate mood & affect.  Head: Norm cephalic and atraumatic  Neck:  No cervical paraspinal banding.  Full range of motion with lateral turning and cervical flexion +extension.  Eyes: normal conjunctiva, normal lids, normal pupils  ENT and mouth: normal external ear, nose, and no lesions noted on the lips.  Respiratory: Symmetrical, Unlabored. No dyspnea  CV: normal rhythm and rate. No peripheral edema.   Abdomen: Non-distended    Extremities:  Gen: No cyanosis or tenderness to palpation bilateral upper and lower extremities  Skin: Warm, pink, dry, no rashes, no lesions on the lumbar spine  Strength: 5/5 motor strength bilateral upper and lower extremities  ROM: Full ROM in bilateral knees and ankles without pain or instability.    Neuro:  Gait: no altalgic lean, normal toe and heel raise. Independent ambulator.  DTR's: 2+ in bilateral patellar, and ankle  Sensory: Intact to light touch bilateral  upper and lower extremities    Spine: Normal lordosis. No scoliosis  L-spine ROM:  Limited and painful ROM to flexion, extension, bilateral rotation,   Straight Leg Raise:  Negative bilaterally  SI Joint: No tenderness to palpation bilaterally.  Negative BRITTNEY bilaterally, negative thigh thrust bilaterally.        Assessment:     This is a pleasant 58-year-old established female patient presenting as a postoperative follow-up for bilateral SI joint injections on 05/20/2024 and a preop visit for her pending cervical epidural steroid injection C7-T1 scheduled on 07/01/2024.  Receive a proximally 50% reduction to her bilateral buttock pain after completing her bilateral SI joint injections.  Over all she is very satisfied with this  pain and states it has reduced with sitting, her sleeping has improved household chores she can do longer especially mopping and sweeping and she feels like she walks straighter and can work in her garden little longer.  However she has notable pain to her lumbar axial spine that is more pronounced with mopping and sweeping and standing too long will exacerbate this pain.  This will elevate to a 10/10 in the lumbar axial spine.  Her pain to bilateral buttocks averages anywhere from a 0 to a 4/10 and again is very manageable since she completed the bilateral SI joint injections.  She would also like to move forward with her cervical epidural steroid injection as she has ongoing neck pain  .  Her cervical MRI imaging is consistent with cervical degenerative disc disease and foraminal narrowing that has a pain generator.     She had a [employer] spinal cord stimulator implanted in February with Dr. Martino. She states that it does help with her leg pain, however she is still complaining of low back pain as well as neck pain.  Review of her lumbar MRI shows notable facet arthropathy throughout.  She sees Dr. Ha and he prescribed Norco, but states that the dose has been weaned and she is now having increased back pain.  She would also like to go ahead and proceed with her cervical epidural steroid injection at C7-T1 scheduled on 07/01/2024.  She is also interested in discussing future considerations of diagnostic lumbar medial branch blocks and possible radiofrequency ablation.  Now that she has a spinal cord stimulator if that is something she wants to move forward with she will need to turn the spinal cord stimulator off if she advanced his to the point where she would have a radiofrequency ablation.  She was also interested in teaching sheets about this last procedure.  Plan of care:   Patient to hold Ibuprofen, Mobic. Omega 3/Fish oil 7 days prior to procedure and resume post.     Encounter Diagnoses    Name Primary?    Sacroiliitis Yes    Cervicalgia     Cervical radiculopathy          Plan:       Adrianna was seen today for post-op evaluation.    Diagnoses and all orders for this visit:    Sacroiliitis    Cervicalgia    Cervical radiculopathy               Past Medical History:   Diagnosis Date    Anxiety     Arthritis     Chronic back pain     Depression     Digestive disorder     Insomnia     Seasonal allergies     Thyroid disease        Past Surgical History:   Procedure Laterality Date     SECTION      x 2    COLONOSCOPY      EPIDURAL STEROID INJECTION INTO CERVICAL SPINE N/A 2023    Procedure: INJECTION, STEROID, SPINE, CERVICAL, EPIDURAL C7 T1;  Surgeon: Erin Mock MD;  Location: [facility] OR;  Service: Pain Management;  Laterality: N/A;  Cervical Steroid Injection C7-T1    INJECTION, SACROILIAC JOINT Bilateral 2024    Procedure: INJECTION,SACROILIAC JOINT Bilateral;  Surgeon: Erin Mock MD;  Location: [facility] OR;  Service: Pain Management;  Laterality: Bilateral;  Bilateral SI Joint Injection    JOINT REPLACEMENT Right     total knee    SPINAL CORD STIMULATOR IMPLANT Left 2024    pt states is turned off    TRANSFORAMINAL EPIDURAL INJECTION OF STEROID Bilateral 2023    Procedure: Injection,steroid,epidural,transforaminal approach;  Surgeon: Erin Mock MD;  Location: [facility] OR;  Service: Pain Management;  Laterality: Bilateral;  TFESI Left L3, Right L4    TRIAL OF SPINAL CORD NERVE STIMULATOR N/A 10/16/2023    Procedure: TRIAL, NEUROSTIMULATOR, SPINAL CORD;  Surgeon: Erin Mock MD;  Location: [facility] OR;  Service: Pain Management;  Laterality: N/A;  Spincal cord stimulator trail    TUBAL LIGATION         Family History   Problem Relation Name Age of Onset    Heart attack Mother      Stroke Father      Seizures Father         Social History     Socioeconomic History    Marital status:    Tobacco Use    Smoking status: Former     Types: Cigarettes     Smokeless tobacco: Never   Substance and Sexual Activity    Alcohol use: Not Currently     Comment: rarely    Drug use: Never    Sexual activity: Not Currently     Partners: Male       Current Outpatient Medications   Medication Sig Dispense Refill    acetaminophen (TYLENOL) 650 MG TbSR Take 650 mg by mouth every 8 (eight) hours as needed.      acetaminophen-codeine 300-60mg (TYLENOL #4) 300-60 mg Tab Take 1 tablet by mouth 3 (three) times daily.      ASHWAGANDHA EXTRACT ORAL Take 1 tablet by mouth Daily.      aspirin 81 mg Cap Take 81 mg by mouth once daily. Will stop on the 7/28 /23      chlorzoxazone (PARAFON FORTE) 500 mg Tab Take 1 tablet (500 mg total) by mouth 2 (two) times daily as needed (muscle spasms). 60 tablet 0    cholecalciferol, vitamin D3, 125 mcg (5,000 unit) Tab Take 5,000 Units by mouth nightly.      cyclobenzaprine (FLEXERIL) 10 MG tablet Take 10 mg by mouth 3 (three) times daily.      diphenhydrAMINE (BENADRYL) 25 mg capsule Take 25 mg by mouth nightly.      fluticasone propionate (FLONASE) 50 mcg/actuation nasal spray 2 sprays by Each Nostril route as needed.      HYDROcodone-acetaminophen (NORCO) 5-325 mg per tablet Take 1 tablet by mouth 3 (three) times daily.      ibuprofen (ADVIL,MOTRIN) 800 MG tablet Take 800 mg by mouth 3 (three) times daily.      levocetirizine (XYZAL) 5 MG tablet Take 5 mg by mouth Daily.      levothyroxine (SYNTHROID) 25 MCG tablet Take 25 mcg by mouth every morning.      LINZESS 290 mcg Cap capsule Take 290 mcg by mouth every morning.      loratadine (CLARITIN) 10 mg tablet Take 10 mg by mouth once daily.      MAGNESIUM CHLORIDE ORAL Take 1 tablet by mouth nightly.      meloxicam (MOBIC) 15 MG tablet Take 15 mg by mouth once daily.      methocarbamoL (ROBAXIN) 750 MG Tab Take 750 mg by mouth every 8 (eight) hours as needed.      methylPREDNISolone (MEDROL DOSEPACK) 4 mg tablet FOLLOW PACKAGE DIRECTIONS      multivitamin (THERAGRAN) per tablet Take 1 tablet by mouth  once daily.      omega-3 fatty acids/fish oil (FISH OIL-OMEGA-3 FATTY ACIDS) 300-1,000 mg capsule Take 1 capsule by mouth 2 (two) times a day.      pantoprazole (PROTONIX) 40 MG tablet Take 40 mg by mouth.      potassium bicarbonate (K-LYTE) disintegrating tablet Take 99 mEq by mouth nightly.      potassium chloride (MICRO-K) 10 MEQ CpSR Take 10 mEq by mouth once daily.      pregabalin (LYRICA) 100 MG capsule Take 100 mg by mouth 3 (three) times daily.      senna (SENOKOT) 8.6 mg tablet Take 17.2 mg by mouth once daily.      tiZANidine (ZANAFLEX) 2 MG tablet Take 2 mg by mouth every evening.      traMADoL (ULTRAM) 50 mg tablet Take 50 mg by mouth 3 (three) times daily.      traZODone (DESYREL) 100 MG tablet Take 100 mg by mouth every evening.      venlafaxine (EFFEXOR) 37.5 MG Tab Take 37.5 mg by mouth 2 (two) times daily.      venlafaxine (EFFEXOR-XR) 37.5 MG 24 hr capsule Take 37.5 mg by mouth.      zolpidem (AMBIEN) 10 mg Tab Take 10 mg by mouth every evening.       No current facility-administered medications for this visit.       Review of patient's allergies indicates:  No Known Allergies

## 2024-06-03 NOTE — H&P (VIEW-ONLY)
Subjective:      Patient ID: Adrianna Childers is a 58 y.o. female.    Chief Complaint: Post-op Evaluation (Post op Pedrito SI joint injection 5/202/24  pre op WINIFRED C7-T1 7/1/24 C/O pain level 0 sitting, walking/standing 8, states procedure helped, Taking Norco for pain. Pre op for WINIFRED C7-T1 .)    Referred by: No ref. provider found     HPI: This is a pleasant 58-year-old established female patient presenting as a postoperative follow-up for bilateral SI joint injections on 05/20/2024 and a preop visit for her pending cervical epidural steroid injection C7-T1 scheduled on 07/01/2024.  Receive a proximally 50% reduction to her bilateral buttock pain after completing her bilateral SI joint injections.  Over all she is very satisfied with this pain and states it has reduced with sitting, her sleeping has improved household chores she can do longer especially mopping and sweeping and she feels like she walks straighter and can work in her garden little longer.  However she has notable pain to her lumbar axial spine that is more pronounced with mopping and sweeping and standing too long will exacerbate this pain.  This will elevate to a 10/10 in the lumbar axial spine.  Her pain to bilateral buttocks averages anywhere from a 0 to a 4/10 and again is very manageable since she completed the bilateral SI joint injections.  She would also like to move forward with her cervical epidural steroid injection as she has ongoing neck pain  .  Her cervical MRI imaging is consistent with cervical degenerative disc disease and foraminal narrowing that has a pain generator.     She had a [employer] spinal cord stimulator implanted in February with Dr. Martino. She states that it does help with her leg pain, however she is still complaining of low back pain as well as neck pain.  Review of her lumbar MRI shows notable facet arthropathy throughout.  She sees Dr. Ha and he prescribed Norco, but states that the dose has been weaned  "and she is now having increased back pain.  She would also like to go ahead and proceed with her cervical epidural steroid injection at C7-T1 scheduled on 07/01/2024.  She is also interested in discussing future considerations of diagnostic lumbar medial branch blocks and possible radiofrequency ablation.  Now that she has a spinal cord stimulator if that is something she wants to move forward with she will need to turn the spinal cord stimulator off if she advanced his to the point where she would have a radiofrequency ablation.  She was also interested in teaching sheets about this last procedure.    Vital signs:   Vitals:    06/03/24 1033   BP: 126/69   Pulse: 80   Weight: 108.9 kg (240 lb)   Height: 5' 2" (1.575 m)     Body mass index is 43.9 kg/m².  Pain Disability Index (PDI): 39       Interventional Pain History  05/20/2024:  Bilateral SI joint injections  (pending) cervical epidural steroid injection C7-T1 scheduled on 07/01/2023    ROS: Bilateral buttock pain + neck pain    Cervical MRI 2024     The cervical vertebral body heights and alignment of the cervical vertebrae in the AP plane are well maintained.  No acute fractures or subluxations are identified.  There is no prevertebral soft tissue swelling.  There are hemangiomata at several levels, and there is heterogeneous marrow signal due to asymmetric marrow conversion.  No other non degenerative marrow signal changes are present.  The visualized cervical and upper thoracic spinal cord has normal signal and morphology. The visualized cervical and upper thoracic spinal cord signal is unremarkable.  The thyroid gland is partially obscured but could be enlarged, and thyroid nodules/lesions cannot be excluded.  There is fluid in the posterior nasopharynx versus oropharynx.     At C2-C3, there is a similar minimal broad disc bulge-marginal osteophyte complex.  The canal and foramina are patent.  Minimal broad disc bulge     At C3-C4, there is a similar mild " broad disc bulge-marginal osteophyte complex.  The canal and foramina are widely patent.     At C4-C5, there is decreased disc height with similar appearing broad disc bulge-marginal osteophyte complex slightly asymmetric to the right that contacts the ventral cord.  Although there is also some ligamentum flavum hypertrophy, there is no canal stenosis.  Uncovertebral hypertrophy and facet arthropathy are causing similar at least moderate or greater bilateral foraminal stenosis.     At C5-C6, there is some interval desiccation of the previously seen right paracentral disc bulge now with disc bulge-marginal osteophyte complex the most prominent right paracentral canal.  There is no canal stenosis.  Uncovertebral hypertrophy and facet arthropathy are causing similar at least moderate or greater left and mild or greater right foraminal stenosis.     At C6-C7, there is interval desiccation of the previously seen broad disc protrusion-marginal osteophyte complex extending across the ventral canal but more prominent on the right.  There is no significant central canal stenosis even given some ligamentum flavum hypertrophy.  There are at least mild to moderate or greater left and moderate or greater right foraminal stenosis.     At C7-T1, there is similar appearance of the broad disc bulge asymmetric to the left that with prominent ligamentum flavum hypertrophy is causing no canal stenosis.  There are at least mild to moderate or greater left and milder greater right foraminal stenosis.     Impression:        Changes from cervical spondylosis are mostly not simply changed in the previous exam, but there is interval decrease in the previously seen right paracentral disc bulge at C5-C6 and the broad disc protrusion-marginal osteophyte complex extending across the ventral canal more prominent a right at C6-C7.  There is no significant cervical spinal canal stenosis, but there are multilevel bilateral foraminal stenoses, as  above.        Electronically signed by:Nando Bates MD  Date:                                            03/29/2023        Objective:          Physical Exam    General: Well developed; overweight; A&O x 3; No anxiety/depression; NAD  Mental Status: Oriented to person, palce and time. Displays appropriate mood & affect.  Head: Norm cephalic and atraumatic  Neck:  No cervical paraspinal banding.  Full range of motion with lateral turning and cervical flexion +extension.  Eyes: normal conjunctiva, normal lids, normal pupils  ENT and mouth: normal external ear, nose, and no lesions noted on the lips.  Respiratory: Symmetrical, Unlabored. No dyspnea  CV: normal rhythm and rate. No peripheral edema.   Abdomen: Non-distended    Extremities:  Gen: No cyanosis or tenderness to palpation bilateral upper and lower extremities  Skin: Warm, pink, dry, no rashes, no lesions on the lumbar spine  Strength: 5/5 motor strength bilateral upper and lower extremities  ROM: Full ROM in bilateral knees and ankles without pain or instability.    Neuro:  Gait: no altalgic lean, normal toe and heel raise. Independent ambulator.  DTR's: 2+ in bilateral patellar, and ankle  Sensory: Intact to light touch bilateral  upper and lower extremities    Spine: Normal lordosis. No scoliosis  L-spine ROM:  Limited and painful ROM to flexion, extension, bilateral rotation,   Straight Leg Raise:  Negative bilaterally  SI Joint: No tenderness to palpation bilaterally.  Negative BRITTNEY bilaterally, negative thigh thrust bilaterally.        Assessment:     This is a pleasant 58-year-old established female patient presenting as a postoperative follow-up for bilateral SI joint injections on 05/20/2024 and a preop visit for her pending cervical epidural steroid injection C7-T1 scheduled on 07/01/2024.  Receive a proximally 50% reduction to her bilateral buttock pain after completing her bilateral SI joint injections.  Over all she is very satisfied with this  pain and states it has reduced with sitting, her sleeping has improved household chores she can do longer especially mopping and sweeping and she feels like she walks straighter and can work in her garden little longer.  However she has notable pain to her lumbar axial spine that is more pronounced with mopping and sweeping and standing too long will exacerbate this pain.  This will elevate to a 10/10 in the lumbar axial spine.  Her pain to bilateral buttocks averages anywhere from a 0 to a 4/10 and again is very manageable since she completed the bilateral SI joint injections.  She would also like to move forward with her cervical epidural steroid injection as she has ongoing neck pain  .  Her cervical MRI imaging is consistent with cervical degenerative disc disease and foraminal narrowing that has a pain generator.     She had a [employer] spinal cord stimulator implanted in February with Dr. Martino. She states that it does help with her leg pain, however she is still complaining of low back pain as well as neck pain.  Review of her lumbar MRI shows notable facet arthropathy throughout.  She sees Dr. Ha and he prescribed Norco, but states that the dose has been weaned and she is now having increased back pain.  She would also like to go ahead and proceed with her cervical epidural steroid injection at C7-T1 scheduled on 07/01/2024.  She is also interested in discussing future considerations of diagnostic lumbar medial branch blocks and possible radiofrequency ablation.  Now that she has a spinal cord stimulator if that is something she wants to move forward with she will need to turn the spinal cord stimulator off if she advanced his to the point where she would have a radiofrequency ablation.  She was also interested in teaching sheets about this last procedure.  Plan of care:   Patient to hold Ibuprofen, Mobic. Omega 3/Fish oil 7 days prior to procedure and resume post.     Encounter Diagnoses    Name Primary?    Sacroiliitis Yes    Cervicalgia     Cervical radiculopathy          Plan:       Adrianna was seen today for post-op evaluation.    Diagnoses and all orders for this visit:    Sacroiliitis    Cervicalgia    Cervical radiculopathy               Past Medical History:   Diagnosis Date    Anxiety     Arthritis     Chronic back pain     Depression     Digestive disorder     Insomnia     Seasonal allergies     Thyroid disease        Past Surgical History:   Procedure Laterality Date     SECTION      x 2    COLONOSCOPY      EPIDURAL STEROID INJECTION INTO CERVICAL SPINE N/A 2023    Procedure: INJECTION, STEROID, SPINE, CERVICAL, EPIDURAL C7 T1;  Surgeon: Erin Mock MD;  Location: [facility] OR;  Service: Pain Management;  Laterality: N/A;  Cervical Steroid Injection C7-T1    INJECTION, SACROILIAC JOINT Bilateral 2024    Procedure: INJECTION,SACROILIAC JOINT Bilateral;  Surgeon: Erin Mock MD;  Location: [facility] OR;  Service: Pain Management;  Laterality: Bilateral;  Bilateral SI Joint Injection    JOINT REPLACEMENT Right     total knee    SPINAL CORD STIMULATOR IMPLANT Left 2024    pt states is turned off    TRANSFORAMINAL EPIDURAL INJECTION OF STEROID Bilateral 2023    Procedure: Injection,steroid,epidural,transforaminal approach;  Surgeon: Erin Mock MD;  Location: [facility] OR;  Service: Pain Management;  Laterality: Bilateral;  TFESI Left L3, Right L4    TRIAL OF SPINAL CORD NERVE STIMULATOR N/A 10/16/2023    Procedure: TRIAL, NEUROSTIMULATOR, SPINAL CORD;  Surgeon: Erin Mock MD;  Location: [facility] OR;  Service: Pain Management;  Laterality: N/A;  Spincal cord stimulator trail    TUBAL LIGATION         Family History   Problem Relation Name Age of Onset    Heart attack Mother      Stroke Father      Seizures Father         Social History     Socioeconomic History    Marital status:    Tobacco Use    Smoking status: Former     Types: Cigarettes     Smokeless tobacco: Never   Substance and Sexual Activity    Alcohol use: Not Currently     Comment: rarely    Drug use: Never    Sexual activity: Not Currently     Partners: Male       Current Outpatient Medications   Medication Sig Dispense Refill    acetaminophen (TYLENOL) 650 MG TbSR Take 650 mg by mouth every 8 (eight) hours as needed.      acetaminophen-codeine 300-60mg (TYLENOL #4) 300-60 mg Tab Take 1 tablet by mouth 3 (three) times daily.      ASHWAGANDHA EXTRACT ORAL Take 1 tablet by mouth Daily.      aspirin 81 mg Cap Take 81 mg by mouth once daily. Will stop on the 7/28 /23      chlorzoxazone (PARAFON FORTE) 500 mg Tab Take 1 tablet (500 mg total) by mouth 2 (two) times daily as needed (muscle spasms). 60 tablet 0    cholecalciferol, vitamin D3, 125 mcg (5,000 unit) Tab Take 5,000 Units by mouth nightly.      cyclobenzaprine (FLEXERIL) 10 MG tablet Take 10 mg by mouth 3 (three) times daily.      diphenhydrAMINE (BENADRYL) 25 mg capsule Take 25 mg by mouth nightly.      fluticasone propionate (FLONASE) 50 mcg/actuation nasal spray 2 sprays by Each Nostril route as needed.      HYDROcodone-acetaminophen (NORCO) 5-325 mg per tablet Take 1 tablet by mouth 3 (three) times daily.      ibuprofen (ADVIL,MOTRIN) 800 MG tablet Take 800 mg by mouth 3 (three) times daily.      levocetirizine (XYZAL) 5 MG tablet Take 5 mg by mouth Daily.      levothyroxine (SYNTHROID) 25 MCG tablet Take 25 mcg by mouth every morning.      LINZESS 290 mcg Cap capsule Take 290 mcg by mouth every morning.      loratadine (CLARITIN) 10 mg tablet Take 10 mg by mouth once daily.      MAGNESIUM CHLORIDE ORAL Take 1 tablet by mouth nightly.      meloxicam (MOBIC) 15 MG tablet Take 15 mg by mouth once daily.      methocarbamoL (ROBAXIN) 750 MG Tab Take 750 mg by mouth every 8 (eight) hours as needed.      methylPREDNISolone (MEDROL DOSEPACK) 4 mg tablet FOLLOW PACKAGE DIRECTIONS      multivitamin (THERAGRAN) per tablet Take 1 tablet by mouth  once daily.      omega-3 fatty acids/fish oil (FISH OIL-OMEGA-3 FATTY ACIDS) 300-1,000 mg capsule Take 1 capsule by mouth 2 (two) times a day.      pantoprazole (PROTONIX) 40 MG tablet Take 40 mg by mouth.      potassium bicarbonate (K-LYTE) disintegrating tablet Take 99 mEq by mouth nightly.      potassium chloride (MICRO-K) 10 MEQ CpSR Take 10 mEq by mouth once daily.      pregabalin (LYRICA) 100 MG capsule Take 100 mg by mouth 3 (three) times daily.      senna (SENOKOT) 8.6 mg tablet Take 17.2 mg by mouth once daily.      tiZANidine (ZANAFLEX) 2 MG tablet Take 2 mg by mouth every evening.      traMADoL (ULTRAM) 50 mg tablet Take 50 mg by mouth 3 (three) times daily.      traZODone (DESYREL) 100 MG tablet Take 100 mg by mouth every evening.      venlafaxine (EFFEXOR) 37.5 MG Tab Take 37.5 mg by mouth 2 (two) times daily.      venlafaxine (EFFEXOR-XR) 37.5 MG 24 hr capsule Take 37.5 mg by mouth.      zolpidem (AMBIEN) 10 mg Tab Take 10 mg by mouth every evening.       No current facility-administered medications for this visit.       Review of patient's allergies indicates:  No Known Allergies

## 2024-07-01 ENCOUNTER — HOSPITAL ENCOUNTER (OUTPATIENT)
Facility: HOSPITAL | Age: 59
Discharge: HOME | End: 2024-07-01
Attending: ANESTHESIOLOGY | Admitting: ANESTHESIOLOGY
Payer: MEDICARE

## 2024-07-01 ENCOUNTER — ANESTHESIA EVENT (OUTPATIENT)
Dept: SURGERY | Facility: HOSPITAL | Age: 59
End: 2024-07-01
Payer: MEDICARE

## 2024-07-01 ENCOUNTER — ANESTHESIA (OUTPATIENT)
Dept: SURGERY | Facility: HOSPITAL | Age: 59
End: 2024-07-01
Payer: MEDICARE

## 2024-07-01 DIAGNOSIS — G89.29 CHRONIC NECK PAIN: ICD-10-CM

## 2024-07-01 DIAGNOSIS — M54.2 CHRONIC NECK PAIN: ICD-10-CM

## 2024-07-01 PROCEDURE — 63600175 PHARM REV CODE 636 W HCPCS: Performed by: NURSE ANESTHETIST, CERTIFIED REGISTERED

## 2024-07-01 PROCEDURE — 63600175 PHARM REV CODE 636 W HCPCS: Performed by: ANESTHESIOLOGY

## 2024-07-01 PROCEDURE — 25000003 PHARM REV CODE 250: Performed by: ANESTHESIOLOGY

## 2024-07-01 PROCEDURE — 62321 NJX INTERLAMINAR CRV/THRC: CPT | Mod: ,,, | Performed by: ANESTHESIOLOGY

## 2024-07-01 PROCEDURE — A4216 STERILE WATER/SALINE, 10 ML: HCPCS | Performed by: ANESTHESIOLOGY

## 2024-07-01 PROCEDURE — 37000008 HC ANESTHESIA 1ST 15 MINUTES: Performed by: ANESTHESIOLOGY

## 2024-07-01 PROCEDURE — 62321 NJX INTERLAMINAR CRV/THRC: CPT | Performed by: ANESTHESIOLOGY

## 2024-07-01 PROCEDURE — 25000003 PHARM REV CODE 250: Performed by: NURSE ANESTHETIST, CERTIFIED REGISTERED

## 2024-07-01 RX ORDER — LIDOCAINE HYDROCHLORIDE 10 MG/ML
INJECTION, SOLUTION EPIDURAL; INFILTRATION; INTRACAUDAL; PERINEURAL
Status: DISCONTINUED | OUTPATIENT
Start: 2024-07-01 | End: 2024-07-01

## 2024-07-01 RX ORDER — DEXAMETHASONE SODIUM PHOSPHATE 10 MG/ML
INJECTION INTRAMUSCULAR; INTRAVENOUS
Status: DISCONTINUED
Start: 2024-07-01 | End: 2024-07-01 | Stop reason: HOSPADM

## 2024-07-01 RX ORDER — PROPOFOL 10 MG/ML
VIAL (ML) INTRAVENOUS
Status: DISCONTINUED | OUTPATIENT
Start: 2024-07-01 | End: 2024-07-01

## 2024-07-01 RX ORDER — LIDOCAINE HYDROCHLORIDE 10 MG/ML
INJECTION, SOLUTION EPIDURAL; INFILTRATION; INTRACAUDAL; PERINEURAL
Status: DISCONTINUED | OUTPATIENT
Start: 2024-07-01 | End: 2024-07-01 | Stop reason: HOSPADM

## 2024-07-01 RX ORDER — LIDOCAINE HYDROCHLORIDE 10 MG/ML
INJECTION, SOLUTION EPIDURAL; INFILTRATION; INTRACAUDAL; PERINEURAL
Status: DISCONTINUED
Start: 2024-07-01 | End: 2024-07-01 | Stop reason: HOSPADM

## 2024-07-01 RX ORDER — DEXAMETHASONE SODIUM PHOSPHATE 10 MG/ML
INJECTION INTRAMUSCULAR; INTRAVENOUS
Status: DISCONTINUED | OUTPATIENT
Start: 2024-07-01 | End: 2024-07-01 | Stop reason: HOSPADM

## 2024-07-01 RX ORDER — SODIUM CHLORIDE 0.9 % (FLUSH) 0.9 %
SYRINGE (ML) INJECTION
Status: DISCONTINUED | OUTPATIENT
Start: 2024-07-01 | End: 2024-07-01 | Stop reason: HOSPADM

## 2024-07-01 RX ADMIN — LIDOCAINE HYDROCHLORIDE 50 MG: 10 INJECTION, SOLUTION EPIDURAL; INFILTRATION; INTRACAUDAL; PERINEURAL at 11:07

## 2024-07-01 RX ADMIN — SODIUM CHLORIDE, POTASSIUM CHLORIDE, SODIUM LACTATE AND CALCIUM CHLORIDE: 600; 310; 30; 20 INJECTION, SOLUTION INTRAVENOUS at 11:07

## 2024-07-01 RX ADMIN — PROPOFOL 50 MG: 10 INJECTION, EMULSION INTRAVENOUS at 11:07

## 2024-07-01 NOTE — ANESTHESIA POSTPROCEDURE EVALUATION
Anesthesia Post Evaluation    Patient: Adrianna Childers    Procedure(s) Performed: Procedure(s) (LRB):  INJECTION, STEROID, SPINE, CERVICAL, EPIDURAL C7 T1 (N/A)    Final Anesthesia Type: general      Patient location during evaluation: OPS  Patient participation: Yes- Able to Participate  Level of consciousness: awake and alert  Post-procedure vital signs: reviewed and stable  Pain management: adequate  Airway patency: patent    PONV status at discharge: No PONV  Anesthetic complications: no      Cardiovascular status: blood pressure returned to baseline  Respiratory status: unassisted  Hydration status: euvolemic  Follow-up not needed.              Vitals Value Taken Time   /77 07/01/24 1121   Temp  07/01/24 1121   Pulse 74 07/01/24 1121   Resp 16 07/01/24 1121   SpO2 99 % 07/01/24 1121         No case tracking events are documented in the log.      Pain/Janice Score: No data recorded

## 2024-07-01 NOTE — ANESTHESIA PREPROCEDURE EVALUATION
2024  Adrianna Childers is a 59 y.o., female presents with chronic neck pain due to:.  Diagnosis:      Chronic neck pain [M54.2, G89.29]      Cervical spondylosis [M47.812]      DDD (degenerative disc disease), cervical [M50.30]      Radiculopathy, cervical region [M54.12]     The pt. Comes to \[facility]\"" for the noted pain management procedure under IV Sedation / TIVA w/ Local.  Case: [id number] Date/Time: 24 1255   Procedure:   INJECTION, STEROID, SPINE, CERVICAL, EPIDURAL C7 T1 (Spine Cervical) - WINIFRED C7-T1   Anesthesia type: Local MAC         PMHx:  Chronic back pain    Other Medical History   Depression Anxiety   Thyroid disease Insomnia   Arthritis Seasonal allergies   Digestive disorder      Problem List  Current as of 24 0919  Cervical spondylosis Chronic back pain greater than 3 months duration   Chronic neck pain DDD (degenerative disc disease), cervical   Lumbar degenerative disc disease Lumbar radiculitis   Lumbar spondylosis Morbid obesity with BMI of 40.0-44.9, adult   Sacroiliitis              PSHx:  Surgical History:  TUBAL LIGATION JOINT REPLACEMENT    SECTION COLONOSCOPY   TRANSFORAMINAL EPIDURAL INJECTION OF STEROID EPIDURAL STEROID INJECTION INTO CERVICAL SPINE   TRIAL OF SPINAL CORD NERVE STIMULATOR SPINAL CORD STIMULATOR IMPLANT   INJECTION, SACROILIAC JOINT        Vital signs:        Lab Data:      EKG:          Pre-op Assessment    I have reviewed the Patient Summary Reports.     I have reviewed the Nursing Notes. I have reviewed the NPO Status.   I have reviewed the Medications.     Review of Systems  Anesthesia Hx:  No problems with previous Anesthesia                Social:  Non-Smoker       Hematology/Oncology:  Hematology Normal   Oncology Normal                                   EENT/Dental:  EENT/Dental Normal           Cardiovascular:  Cardiovascular Normal  Exercise tolerance: good                   Functional Capacity good / => 4 METS                         Pulmonary:  Pulmonary Normal                       Renal/:  Renal/ Normal                 Hepatic/GI:  Hepatic/GI Normal                 Musculoskeletal:  Arthritis               Neurological:    Neuromuscular Disease,                                   Endocrine:  Endocrine Normal            Dermatological:  Skin Normal    Psych:  Psychiatric History                  Physical Exam  General: Alert, Oriented, Well nourished and Cooperative    Airway:  Mallampati: II   Mouth Opening: Normal  TM Distance: Normal  Tongue: Normal  Neck ROM: Normal ROM    Dental:  Intact    Chest/Lungs:  Clear to auscultation, Normal Respiratory Rate    Heart:  Rate: Normal  Rhythm: Regular Rhythm        Anesthesia Plan  Type of Anesthesia, risks & benefits discussed:    Anesthesia Type: MAC, Gen Natural Airway  Intra-op Monitoring Plan: Standard ASA Monitors  Post Op Pain Control Plan: IV/PO Opioids PRN and multimodal analgesia  Induction:  IV  ASA Score: 2  Day of Surgery Review of History & Physical: H&P Update referred to the surgeon/provider.  Anesthesia Plan Notes: IV Sedation with IV Propofol    Ready For Surgery From Anesthesia Perspective.     .

## 2024-07-01 NOTE — OP NOTE
Cervical Epidural Steroid Injection (C7-T1)    Pre-Procedure Diagnoses:  1. Chronic pain syndrome  2. Cervicalgia  3. Cervical radiculopathy  4. Cervical disc displacement    Post-Procedure Diagnoses:  1. Chronic pain syndrome  2. Cervicalgia  3. Cervical radiculopathy  4. Cervical disc displacement    Anesthesia:  Local and MAC    Estimated Blood Loss:  None    Complications:  None    Informed Consent:  The procedure, risks, benefits, and alternatives were discussed with the patient. There were no contraindications to the procedure. The patient expressed understanding and agreed to proceed. Fully informed written consent was obtained.     Description of the Procedure:  The patient was taken to the operating room. IV access was obtained prior to the start of the procedure. The patient was positioned prone on the fluoroscopy table. Continuous hemodynamic monitoring was initiated and continued throughout the duration of the procedure. The skin overlying the cervicothoracic spine was prepped with Chloraprep and draped into a sterile field. Fluoroscopy was used to identify the location of the C7-T1 interspace. Skin anesthesia was achieved using 3 mL of 1% lidocaine. An 18 gauge 3.5 inch Tuohy needle was slowly inserted and advanced under intermittent fluoroscopy and into the epidural space by loss of resistance to saline. Proper needle position was confirmed under AP, oblique, and lateral fluoroscopic views. Negative aspiration for blood or CSF was confirmed. 1 mL of Isovue contrast was injected. Fluoroscopic imaging revealed a clear spread of agent from C5 to C7. Then a combination of 10 mg of dexamethasone and 2 mL of preservative-free normal saline was easily injected. There was no pain on injection. The needle was removed intact and bleeding was nil. Sterile bandages were applied. The patient was taken to the recovery room for further observation in stable condition. The patient was then discharged home without any  complications.

## 2024-07-01 NOTE — TRANSFER OF CARE
"Anesthesia Transfer of Care Note    Patient: Adrianna Childers    Procedure(s) Performed: Procedure(s) (LRB):  INJECTION, STEROID, SPINE, CERVICAL, EPIDURAL C7 T1 (N/A)    Patient location: OPS    Anesthesia Type: general    Transport from OR: Transported from OR on room air with adequate spontaneous ventilation    Post pain: adequate analgesia    Post assessment: no apparent anesthetic complications    Post vital signs: stable    Level of consciousness: sedated and awake    Nausea/Vomiting: no nausea/vomiting    Complications: none    Transfer of care protocol was followed      Last vitals: Visit Vitals  /77   Pulse 74   Temp 36.8 °C (98.3 °F) (Oral)   Resp 16   Ht 5' 2" (1.575 m)   Wt 102.5 kg (225 lb 15.5 oz)   SpO2 99%   Breastfeeding No   BMI 41.33 kg/m²     "

## 2024-07-01 NOTE — PLAN OF CARE
Problem: Pain Acute  Goal: Optimal Pain Control and Function  Outcome: Met     Problem: Fall Injury Risk  Goal: Absence of Fall and Fall-Related Injury  Outcome: Met     Problem: Comorbidity Management  Goal: Maintenance of Behavioral Health Symptom Control  Outcome: Progressing  Goal: Maintenance of Osteoarthritis Symptom Control  Outcome: Progressing     Problem: Adult Inpatient Plan of Care  Goal: Plan of Care Review  Outcome: Met  Goal: Patient-Specific Goal (Individualized)  Outcome: Met  Goal: Absence of Hospital-Acquired Illness or Injury  Outcome: Met  Goal: Optimal Comfort and Wellbeing  Outcome: Met  Goal: Readiness for Transition of Care  Outcome: Met     Problem: Bariatric Environmental Safety  Goal: Safety Maintained with Care  Outcome: Progressing     Problem: Wound  Goal: Optimal Coping  Outcome: Met  Goal: Optimal Functional Ability  Outcome: Met  Goal: Absence of Infection Signs and Symptoms  Outcome: Met  Goal: Improved Oral Intake  Outcome: Met  Goal: Optimal Pain Control and Function  Outcome: Met  Goal: Skin Health and Integrity  Outcome: Met  Goal: Optimal Wound Healing  Outcome: Met

## 2024-07-01 NOTE — PLAN OF CARE
Problem: Pain Acute  Goal: Optimal Pain Control and Function  Outcome: Met     Problem: Fall Injury Risk  Goal: Absence of Fall and Fall-Related Injury  Outcome: Met     Problem: Infection  Goal: Absence of Infection Signs and Symptoms  Outcome: Met     Problem: Comorbidity Management  Goal: Maintenance of Behavioral Health Symptom Control  7/1/2024 1151 by Keysha Freed RN  Outcome: Progressing  7/1/2024 1136 by Keysha Freed RN  Outcome: Progressing  Goal: Maintenance of Osteoarthritis Symptom Control  7/1/2024 1151 by Keysha Freed RN  Outcome: Progressing  7/1/2024 1136 by Keysha Freed RN  Outcome: Progressing     Problem: Adult Inpatient Plan of Care  Goal: Plan of Care Review  Outcome: Met  Goal: Patient-Specific Goal (Individualized)  Outcome: Met  Goal: Absence of Hospital-Acquired Illness or Injury  Outcome: Met  Goal: Optimal Comfort and Wellbeing  Outcome: Met  Goal: Readiness for Transition of Care  Outcome: Met     Problem: Wound  Goal: Optimal Coping  Outcome: Met  Goal: Optimal Functional Ability  Outcome: Met  Goal: Absence of Infection Signs and Symptoms  Outcome: Met  Goal: Improved Oral Intake  Outcome: Met  Goal: Optimal Pain Control and Function  Outcome: Met  Goal: Skin Health and Integrity  Outcome: Met  Goal: Optimal Wound Healing  Outcome: Met     Problem: Bariatric Environmental Safety  Goal: Safety Maintained with Care  7/1/2024 1151 by Keysha Freed RN  Outcome: Progressing  7/1/2024 1136 by Keysha Freed RN  Outcome: Progressing

## 2024-07-03 VITALS
HEART RATE: 72 BPM | OXYGEN SATURATION: 98 % | DIASTOLIC BLOOD PRESSURE: 78 MMHG | TEMPERATURE: 98 F | HEIGHT: 62 IN | SYSTOLIC BLOOD PRESSURE: 113 MMHG | RESPIRATION RATE: 16 BRPM | WEIGHT: 226 LBS | BODY MASS INDEX: 41.59 KG/M2

## 2024-07-15 ENCOUNTER — OFFICE VISIT (OUTPATIENT)
Dept: PAIN MEDICINE | Facility: CLINIC | Age: 59
End: 2024-07-15
Payer: MEDICARE

## 2024-07-15 VITALS
HEIGHT: 62 IN | HEART RATE: 93 BPM | WEIGHT: 225 LBS | BODY MASS INDEX: 41.41 KG/M2 | SYSTOLIC BLOOD PRESSURE: 119 MMHG | DIASTOLIC BLOOD PRESSURE: 72 MMHG

## 2024-07-15 DIAGNOSIS — M48.02 FORAMINAL STENOSIS OF CERVICAL REGION: ICD-10-CM

## 2024-07-15 DIAGNOSIS — M54.16 RADICULOPATHY, LUMBAR REGION: ICD-10-CM

## 2024-07-15 DIAGNOSIS — M47.816 LUMBAR SPONDYLOSIS: ICD-10-CM

## 2024-07-15 DIAGNOSIS — M54.12 CERVICAL RADICULOPATHY: ICD-10-CM

## 2024-07-15 DIAGNOSIS — M47.812 CERVICAL SPONDYLOSIS: ICD-10-CM

## 2024-07-15 DIAGNOSIS — M50.30 DDD (DEGENERATIVE DISC DISEASE), CERVICAL: ICD-10-CM

## 2024-07-15 DIAGNOSIS — M47.816 LUMBAR FACET ARTHROPATHY: Primary | ICD-10-CM

## 2024-07-15 DIAGNOSIS — M54.12 RADICULOPATHY, CERVICAL REGION: ICD-10-CM

## 2024-07-15 PROCEDURE — 1160F RVW MEDS BY RX/DR IN RCRD: CPT | Mod: CPTII,,, | Performed by: NURSE PRACTITIONER

## 2024-07-15 PROCEDURE — 3078F DIAST BP <80 MM HG: CPT | Mod: CPTII,,, | Performed by: NURSE PRACTITIONER

## 2024-07-15 PROCEDURE — 3074F SYST BP LT 130 MM HG: CPT | Mod: CPTII,,, | Performed by: NURSE PRACTITIONER

## 2024-07-15 PROCEDURE — 99214 OFFICE O/P EST MOD 30 MIN: CPT | Mod: ,,, | Performed by: NURSE PRACTITIONER

## 2024-07-15 PROCEDURE — 1159F MED LIST DOCD IN RCRD: CPT | Mod: CPTII,,, | Performed by: NURSE PRACTITIONER

## 2024-07-15 PROCEDURE — 3008F BODY MASS INDEX DOCD: CPT | Mod: CPTII,,, | Performed by: NURSE PRACTITIONER

## 2024-07-15 NOTE — PROGRESS NOTES
"  [facility]    Subjective:     Chief Complaint: Follow-up (F/u procedure 7/1/24 C/O pain level 0, states procedure helped, Taking pain meds)    Referred by: No ref. provider found     History of Present Illness: Adrianna Childers is a 59 y.o. female presents today for a postoperative follow-up of pain associated with cervical degenerative disc disease and radiculopathy after receiving a cervical epidural steroid injection C7-T1 on 07/01/2024 patient states this is the 1st time in years that she has been able to enjoy doing arts and crafts work at home as well as work in her flower bed.  Prior to this cervical epidural steroid injection she would have severe pain moving her neck in that position and bending down in her flower bed to the neck and arms.  She has no pain in that area.      Since her neck pain has reduced notably she would like to focus on pain to her lumbar axial region caused by facet arthropathy throughout.  Unfortunately this is debilitating pain as she can no longer mop or sweep or walk for prolonged periods of time without notable pain in her low back.  These activities will elevate her pain score to a 10/10.  She also relays she is no longer having sciatica since she has been using her spinal cord stimulator. She had a [employer] spinal cord stimulator implanted in February with Dr. Martino.  Review of her lumbar MRI shows notable facet arthropathy throughout. She sees Dr. Ha and he prescribed Norco, but states that the dose has been weaned and she is now having increased back pain.       Visit Vitals  /72 (BP Location: Right arm, Patient Position: Sitting, BP Method: Medium (Automatic))   Pulse 93   Ht 5' 2" (1.575 m)   Wt 102.1 kg (225 lb)   BMI 41.15 kg/m²    There were no vitals filed for this visit.  Pain Disability Index (PDI): 26       Interventional Pain History  07/01/2024:  WINIFRED C7-T1  05/20/2024: Bilateral SI joint injections     ROS: Neck pain + back " pain    Cervical MRI 2024     The cervical vertebral body heights and alignment of the cervical vertebrae in the AP plane are well maintained.  No acute fractures or subluxations are identified.  There is no prevertebral soft tissue swelling.  There are hemangiomata at several levels, and there is heterogeneous marrow signal due to asymmetric marrow conversion.  No other non degenerative marrow signal changes are present.  The visualized cervical and upper thoracic spinal cord has normal signal and morphology. The visualized cervical and upper thoracic spinal cord signal is unremarkable.  The thyroid gland is partially obscured but could be enlarged, and thyroid nodules/lesions cannot be excluded.  There is fluid in the posterior nasopharynx versus oropharynx.     At C2-C3, there is a similar minimal broad disc bulge-marginal osteophyte complex.  The canal and foramina are patent.  Minimal broad disc bulge     At C3-C4, there is a similar mild broad disc bulge-marginal osteophyte complex.  The canal and foramina are widely patent.     At C4-C5, there is decreased disc height with similar appearing broad disc bulge-marginal osteophyte complex slightly asymmetric to the right that contacts the ventral cord.  Although there is also some ligamentum flavum hypertrophy, there is no canal stenosis.  Uncovertebral hypertrophy and facet arthropathy are causing similar at least moderate or greater bilateral foraminal stenosis.     At C5-C6, there is some interval desiccation of the previously seen right paracentral disc bulge now with disc bulge-marginal osteophyte complex the most prominent right paracentral canal.  There is no canal stenosis.  Uncovertebral hypertrophy and facet arthropathy are causing similar at least moderate or greater left and mild or greater right foraminal stenosis.     At C6-C7, there is interval desiccation of the previously seen broad disc protrusion-marginal osteophyte complex extending across the  ventral canal but more prominent on the right.  There is no significant central canal stenosis even given some ligamentum flavum hypertrophy.  There are at least mild to moderate or greater left and moderate or greater right foraminal stenosis.     At C7-T1, there is similar appearance of the broad disc bulge asymmetric to the left that with prominent ligamentum flavum hypertrophy is causing no canal stenosis.  There are at least mild to moderate or greater left and milder greater right foraminal stenosis.     Impression:        Changes from cervical spondylosis are mostly not simply changed in the previous exam, but there is interval decrease in the previously seen right paracentral disc bulge at C5-C6 and the broad disc protrusion-marginal osteophyte complex extending across the ventral canal more prominent a right at C6-C7.  There is no significant cervical spinal canal stenosis, but there are multilevel bilateral foraminal stenoses, as above.        Electronically signed by:Nando Bates MD  Date:                                            03/29/2023    MRI Lumbar Spine  FINDINGS:2023  The lumbar vertebral body alignment and vertebral body heights are maintained.  Marrow endplate degenerative changes are seen.  No acute fracture or marrow replacement or proliferative process.     The visualized distal spinal cord and conus medullaris are within normal limits.  The conus terminates at T12.  The visualized portions of the abdominal aorta normal caliber.  Mild lumbar dextrocurvature noted.  The paravertebral soft tissues appear normal.     Seen on sagittal images only, there is disc desiccation, disc space narrowing, and endplate degenerative changes of the visualized lower thoracic spine.  At T12-L1, there is moderate disc space narrowing with Schmorl's nodes.  There is no significant disc herniation or bulge.  No central canal or stenosis.  L1-2: There is moderate severe disc space narrowing.  There is mild  moderate circumferential disc bulge.  There is a spur post small left paracentral disc protrusion.  There is no central canal or foraminal stenosis evident     L2-3: Moderate diffuse disc space narrowing noted.  Moderate diffuse disc bulge is seen.  There is indentation of ventral thecal sac.  There is mild left lateral recess stenosis.  There is mild left-sided foraminal stenosis.     L3-L4: There is moderate severe disc space narrowing.  There are endplate degenerative changes seen.  There is moderate diffuse disc bulge with superimposed central left paracentral disc protrusion with 5 mm of caudal migration.  Mild bilateral facet arthrosis noted.  There is mild moderate bilateral foraminal stenosis.  No central canal stenosis.     L4-5: Severe disc space narrowing noted.  There is moderate diffuse disc bulge.  There is mild bilateral facet arthrosis.  There is moderate severe right and mild left neural foraminal stenosis.     L5-S1: Moderate disc space narrowing noted.  Mild disc bulge.  Mild bilateral facet arthrosis noted.  There is mild moderate bilateral foraminal stenosis Multilevel degenerative disc disease and facet arthrosis with multilevel canal     Impression:     Multilevel degenerative disc disease and facet arthrosis with multilevel foraminal stenosis as described above.  No central canal stenosis.              Objective:        Physical Exam  General: Well developed; overweight; A&O x 3; No anxiety/depression; NAD  Mental Status: Oriented to person, palce and time. Displays appropriate mood & affect.  Head: Norm cephalic and atraumatic  Neck:  No cervical paraspinal banding.  Full range of motion with lateral turning and cervical flexion +extension.  Eyes: normal conjunctiva, normal lids, normal pupils  ENT and mouth: normal external ear, nose, and no lesions noted on the lips.  Respiratory: Symmetrical, Unlabored. No dyspnea  CV: normal rhythm and rate. No peripheral edema.   Abdomen:  Non-distended    Extremities:  Gen: No cyanosis or tenderness to palpation bilateral upper and lower extremities  Skin: Warm, pink, dry, no rashes, no lesions on the lumbar spine  Strength: 5/5 motor strength bilateral upper and lower extremities  ROM: Full ROM in bilateral knees and ankles without pain or instability.    Neuro:  Gait: no altalgic lean, normal toe and heel raise. Independent ambulator.  DTR's: 2+ in bilateral patellar, and ankle  Sensory: Intact to light touch bilateral  upper and lower extremities    Spine: Normal lordosis. No scoliosis  L-spine ROM: limited and painful ROM to flexion, extension, bilateral rotation,   Straight Leg Raise:  neg bilaterally  SI Joint: No tenderness to palpation bilaterally.      Assessment:     Adrianna Childers is a 59 y.o. female presents today for a postoperative follow-up of pain associated with cervical degenerative disc disease and radiculopathy after receiving a cervical epidural steroid injection C7-T1 on 07/01/2024 patient states this is the 1st time in years that she has been able to enjoy doing arts and crafts work at home as well as work in her flower bed.  Prior to this cervical epidural steroid injection she would have severe pain moving her neck in that position and bending down in her flower bed to the neck and arms.  She has no pain in that area.      Since her neck pain has reduced notably she would like to focus on pain to her lumbar axial region caused by facet arthropathy throughout.  Unfortunately this is debilitating pain as she can no longer mop or sweep or walk for prolonged periods of time without notable pain in her low back.  These activities will elevate her pain score to a 10/10.  She also relays she is no longer having sciatica since she has been using her spinal cord stimulator. She had a [employer] spinal cord stimulator implanted in February with Dr. Martino.  Review of her lumbar MRI shows notable facet arthropathy throughout.  She sees Dr. Ha and he prescribed Norco, but states that the dose has been weaned and she is now having increased lumbar axial back pain.     Plan of care:   Request sent for 1st diagnostic MBB to bilateral L2-L4.  I also sent a request to Dr. Mock for her opinion to confirm Pt has + SCS. This will be turned off prior to procedure.  Firm spinal cord stimulator is fine as long she turns it off before.  Additionally, Dr. Mock thought she would benefit more from physical therapy as she has very mild facet arthropathy.  I called patient back to relayed the message he she has minimal arthritis in the facet joints.  And recommended physical therapy.  Patient relayed she has completed PT 4-5 times without pain relief to hr lumbar axial spine.  She also relayed that Dr. Mai told her she has a lot of arthritis in her back.  I am reaching back out to Dr. Mock for the next step other than Voltaren gel, etc. since patient relayed   relayed the message above about her arthritis.    Encounter Diagnoses   Name Primary?    Cervical spondylosis     DDD (degenerative disc disease), cervical     Foraminal stenosis of cervical region     Cervical radiculopathy     Lumbar facet arthropathy Yes         Plan:       Adrianna was seen today for follow-up.    Diagnoses and all orders for this visit:    Lumbar facet arthropathy    Cervical spondylosis    DDD (degenerative disc disease), cervical    Foraminal stenosis of cervical region    Cervical radiculopathy             Past Medical History:   Diagnosis Date    Anxiety     Arthritis     Chronic back pain     Depression     Digestive disorder     Insomnia     Seasonal allergies     Thyroid disease        Past Surgical History:   Procedure Laterality Date     SECTION      x 2    COLONOSCOPY      EPIDURAL STEROID INJECTION INTO CERVICAL SPINE N/A 2023    Procedure: INJECTION, STEROID, SPINE, CERVICAL, EPIDURAL C7 T1;  Surgeon: Erin Mock,  MD;  Location: [facility] OR;  Service: Pain Management;  Laterality: N/A;  Cervical Steroid Injection C7-T1    EPIDURAL STEROID INJECTION INTO CERVICAL SPINE N/A 7/1/2024    Procedure: INJECTION, STEROID, SPINE, CERVICAL, EPIDURAL C7 T1;  Surgeon: Erin Mock MD;  Location: [facility] OR;  Service: Pain Management;  Laterality: N/A;  WINIFRED C7-T1    INJECTION, SACROILIAC JOINT Bilateral 5/20/2024    Procedure: INJECTION,SACROILIAC JOINT Bilateral;  Surgeon: Erni Mock MD;  Location: [facility] OR;  Service: Pain Management;  Laterality: Bilateral;  Bilateral SI Joint Injection    JOINT REPLACEMENT Right     total knee    SPINAL CORD STIMULATOR IMPLANT Left 02/12/2024    pt states is turned off    TRANSFORAMINAL EPIDURAL INJECTION OF STEROID Bilateral 06/07/2023    Procedure: Injection,steroid,epidural,transforaminal approach;  Surgeon: Erin Mock MD;  Location: [facility] OR;  Service: Pain Management;  Laterality: Bilateral;  TFESI Left L3, Right L4    TRIAL OF SPINAL CORD NERVE STIMULATOR N/A 10/16/2023    Procedure: TRIAL, NEUROSTIMULATOR, SPINAL CORD;  Surgeon: Erin Mock MD;  Location: [facility] OR;  Service: Pain Management;  Laterality: N/A;  Spincal cord stimulator trail    TUBAL LIGATION         Family History   Problem Relation Name Age of Onset    Heart attack Mother      Stroke Father      Seizures Father         Social History     Socioeconomic History    Marital status:    Tobacco Use    Smoking status: Former     Types: Cigarettes    Smokeless tobacco: Never   Substance and Sexual Activity    Alcohol use: Not Currently     Comment: rarely    Drug use: Never    Sexual activity: Not Currently     Partners: Male       Current Outpatient Medications   Medication Sig Dispense Refill    acetaminophen (TYLENOL) 650 MG TbSR Take 650 mg by mouth every 8 (eight) hours as needed.      ASHWAGANDHA EXTRACT ORAL Take 1 tablet by mouth Daily.      aspirin 81 mg Cap Take 81 mg by mouth once daily. Will stop on  the 7/28 /23      chlorzoxazone (PARAFON FORTE) 500 mg Tab Take 1 tablet (500 mg total) by mouth 2 (two) times daily as needed (muscle spasms). 60 tablet 0    cholecalciferol, vitamin D3, 125 mcg (5,000 unit) Tab Take 5,000 Units by mouth nightly.      cyclobenzaprine (FLEXERIL) 10 MG tablet Take 10 mg by mouth 3 (three) times daily.      diphenhydrAMINE (BENADRYL) 25 mg capsule Take 25 mg by mouth nightly.      fluticasone propionate (FLONASE) 50 mcg/actuation nasal spray 2 sprays by Each Nostril route as needed.      HYDROcodone-acetaminophen (NORCO) 5-325 mg per tablet Take 1 tablet by mouth 3 (three) times daily.      ibuprofen (ADVIL,MOTRIN) 800 MG tablet Take 800 mg by mouth 3 (three) times daily.      levocetirizine (XYZAL) 5 MG tablet Take 5 mg by mouth Daily.      levothyroxine (SYNTHROID) 25 MCG tablet Take 25 mcg by mouth every morning.      LINZESS 290 mcg Cap capsule Take 290 mcg by mouth every morning.      loratadine (CLARITIN) 10 mg tablet Take 10 mg by mouth once daily.      MAGNESIUM CHLORIDE ORAL Take 1 tablet by mouth nightly.      methocarbamoL (ROBAXIN) 750 MG Tab Take 750 mg by mouth every 8 (eight) hours as needed.      multivitamin (THERAGRAN) per tablet Take 1 tablet by mouth once daily.      omega-3 fatty acids/fish oil (FISH OIL-OMEGA-3 FATTY ACIDS) 300-1,000 mg capsule Take 1 capsule by mouth 2 (two) times a day.      pantoprazole (PROTONIX) 40 MG tablet Take 40 mg by mouth.      potassium bicarbonate (K-LYTE) disintegrating tablet Take 99 mEq by mouth nightly.      potassium chloride (MICRO-K) 10 MEQ CpSR Take 10 mEq by mouth once daily.      pregabalin (LYRICA) 100 MG capsule Take 100 mg by mouth 3 (three) times daily.      senna (SENOKOT) 8.6 mg tablet Take 17.2 mg by mouth once daily.      tiZANidine (ZANAFLEX) 2 MG tablet Take 2 mg by mouth every evening.      traZODone (DESYREL) 100 MG tablet Take 100 mg by mouth every evening.      venlafaxine (EFFEXOR) 37.5 MG Tab Take 37.5 mg by  mouth 2 (two) times daily.      zolpidem (AMBIEN) 10 mg Tab Take 10 mg by mouth every evening.      meloxicam (MOBIC) 15 MG tablet Take 15 mg by mouth once daily. (Patient not taking: Reported on 7/15/2024)      traMADoL (ULTRAM) 50 mg tablet Take 50 mg by mouth 3 (three) times daily. (Patient not taking: Reported on 7/15/2024)       No current facility-administered medications for this visit.       Review of patient's allergies indicates:  No Known Allergies

## 2024-08-19 ENCOUNTER — TELEPHONE (OUTPATIENT)
Facility: CLINIC | Age: 59
End: 2024-08-19

## 2024-08-19 NOTE — TELEPHONE ENCOUNTER
----- Message from Erin Mock MD sent at 7/30/2024  4:25 PM CDT -----  Regarding: RE: MBB follow-up with recommendation for physical therapy 1st  PT for 6 weeks within the past 3 months is required.  ----- Message -----  From: Kristina Marques MA  Sent: 7/17/2024  10:44 AM CDT  To: Cassie Richards NP; Erin Mock MD  Subject: FW: MBB follow-up with recommendation for ph#    Spoke with pt she states she has done P.T in the past it did not help, she states she is very active with outdoor chores daily, pt does not want to do P.T she would like to move forward with MBB, please advise thanks  ----- Message -----  From: Cassie Richards NP  Sent: 7/16/2024   8:26 AM CDT  To: [facility] Staff  Subject: MBB follow-up with recommendation for physic#    Please relayed to patient Dr. Natalia saavedra would recommend physical therapy 1st and cancel the medial branch blocks as she has minimal arthritis.  Thanks, Riya  ----- Message -----  From: Erin Mock MD  Sent: 7/15/2024  11:44 AM CDT  To: Cassie Richards NP  Subject: RE: MBBs & SCS                                   Can still do procedure with SCS, but looking at her MRI, her facets look pretty mild to me.  I would probably have her do PT first, I don't know how much MBB would really help.  ----- Message -----  From: Cassie Richards NP  Sent: 7/15/2024  11:33 AM CDT  To: Erin Mock MD  Subject: MBBs & SCS                                       Request sent for 1st diagnostic MBB to bilateral L2-L4  Pt has + SCS. This will be turned off prior to procedure.   She does not have spinal surgery. Just to clarify, SCS should not be a contraindication, correct? Thanks, Riya

## 2024-10-09 ENCOUNTER — TELEPHONE (OUTPATIENT)
Facility: CLINIC | Age: 59
End: 2024-10-09
Payer: MEDICARE

## 2024-10-09 NOTE — TELEPHONE ENCOUNTER
Spoke w/pt letting her know Dr Mock suggest continuing  physical therapy with a total of 4-6 weeks of complete therapy.

## 2024-12-17 ENCOUNTER — OFFICE VISIT (OUTPATIENT)
Facility: CLINIC | Age: 59
End: 2024-12-17
Payer: MEDICARE

## 2024-12-17 VITALS
HEART RATE: 89 BPM | OXYGEN SATURATION: 97 % | WEIGHT: 221 LBS | HEIGHT: 62 IN | DIASTOLIC BLOOD PRESSURE: 71 MMHG | BODY MASS INDEX: 40.67 KG/M2 | RESPIRATION RATE: 20 BRPM | SYSTOLIC BLOOD PRESSURE: 109 MMHG

## 2024-12-17 DIAGNOSIS — G89.29 CHRONIC BACK PAIN GREATER THAN 3 MONTHS DURATION: ICD-10-CM

## 2024-12-17 DIAGNOSIS — M50.30 DDD (DEGENERATIVE DISC DISEASE), CERVICAL: Primary | ICD-10-CM

## 2024-12-17 DIAGNOSIS — M51.16 INTERVERTEBRAL DISC DISORDERS WITH RADICULOPATHY, LUMBAR REGION: ICD-10-CM

## 2024-12-17 DIAGNOSIS — G89.29 CHRONIC NECK PAIN: ICD-10-CM

## 2024-12-17 DIAGNOSIS — M54.9 CHRONIC BACK PAIN GREATER THAN 3 MONTHS DURATION: ICD-10-CM

## 2024-12-17 DIAGNOSIS — M51.362 DEGENERATION OF INTERVERTEBRAL DISC OF LUMBAR REGION WITH DISCOGENIC BACK PAIN AND LOWER EXTREMITY PAIN: ICD-10-CM

## 2024-12-17 DIAGNOSIS — M54.2 CHRONIC NECK PAIN: ICD-10-CM

## 2024-12-17 PROCEDURE — 99213 OFFICE O/P EST LOW 20 MIN: CPT | Mod: ,,, | Performed by: ANESTHESIOLOGY

## 2024-12-17 PROCEDURE — 1160F RVW MEDS BY RX/DR IN RCRD: CPT | Mod: CPTII,,, | Performed by: ANESTHESIOLOGY

## 2024-12-17 PROCEDURE — 3078F DIAST BP <80 MM HG: CPT | Mod: CPTII,,, | Performed by: ANESTHESIOLOGY

## 2024-12-17 PROCEDURE — 1159F MED LIST DOCD IN RCRD: CPT | Mod: CPTII,,, | Performed by: ANESTHESIOLOGY

## 2024-12-17 PROCEDURE — 3008F BODY MASS INDEX DOCD: CPT | Mod: CPTII,,, | Performed by: ANESTHESIOLOGY

## 2024-12-17 PROCEDURE — 3074F SYST BP LT 130 MM HG: CPT | Mod: CPTII,,, | Performed by: ANESTHESIOLOGY

## 2024-12-17 RX ORDER — NALOXEGOL OXALATE 25 MG/1
25 TABLET, FILM COATED ORAL
COMMUNITY
Start: 2024-12-06

## 2024-12-17 RX ORDER — TERBINAFINE HYDROCHLORIDE 250 MG/1
250 TABLET ORAL
COMMUNITY
Start: 2024-12-06

## 2024-12-17 RX ORDER — SIMVASTATIN 20 MG/1
20 TABLET, FILM COATED ORAL NIGHTLY
COMMUNITY
Start: 2024-11-20

## 2024-12-17 NOTE — PROGRESS NOTES
Erin Mock MD        PATIENT NAME: Adrianna Childers  : 1965  DATE: 24  MRN: [id number]      Billing Provider: Erin Mock MD  Level of Service:   Patient PCP Information       Provider PCP Type    Juan Pablo Boo MD General            Reason for Visit / Chief Complaint: Follow-up (Follow up post PT. /Pt reports pain level at a 4, in the lower back with shooting pains down the Rt leg. )       Update PCP  Update Chief Complaint         History of Present Illness / Problem Focused Workflow     Adrianna Childers presents to the clinic with Follow-up (Follow up post PT. /Pt reports pain level at a 4, in the lower back with shooting pains down the Rt leg. )     This is a 59-year-old female who returns to clinic today for follow up of her chronic low back pain as well as neck pain.  She had a [employer] spinal cord stimulator implanted in February with Dr. Martino.  She states that it does help with her leg pain, however she is still complaining of low back pain as well as neck pain.  She sees Dr. Ha and he prescribes Norco, but states that the dose has been weaned and she is now having increased back pain.  She is continuing to work with her spinal cord stimulator representative but states that none of the adjustments her providing any better for relief.  She states that her neck pain is starting to increase and she would like to schedule an injection for that.      Neck Pain     Back Pain    Pain  Associated symptoms include neck pain.   Follow-up  Associated symptoms include neck pain.       Review of Systems     Review of Systems   Musculoskeletal:  Positive for back pain, neck pain and neck stiffness.   Psychiatric/Behavioral:  Positive for sleep disturbance.    All other systems reviewed and are negative.       Medical / Social / Family History     Past Medical History:   Diagnosis Date    Anxiety     Arthritis     Chronic back pain     Depression     Digestive disorder      Insomnia     Seasonal allergies     Thyroid disease        Past Surgical History:   Procedure Laterality Date     SECTION      x 2    COLONOSCOPY      EPIDURAL STEROID INJECTION INTO CERVICAL SPINE N/A 2023    Procedure: INJECTION, STEROID, SPINE, CERVICAL, EPIDURAL C7 T1;  Surgeon: Erin Mock MD;  Location: [facility] OR;  Service: Pain Management;  Laterality: N/A;  Cervical Steroid Injection C7-T1    EPIDURAL STEROID INJECTION INTO CERVICAL SPINE N/A 2024    Procedure: INJECTION, STEROID, SPINE, CERVICAL, EPIDURAL C7 T1;  Surgeon: Erin Mock MD;  Location: [facility] OR;  Service: Pain Management;  Laterality: N/A;  WINIFRED C7-T1    INJECTION, SACROILIAC JOINT Bilateral 2024    Procedure: INJECTION,SACROILIAC JOINT Bilateral;  Surgeon: Erin Mock MD;  Location: [facility] OR;  Service: Pain Management;  Laterality: Bilateral;  Bilateral SI Joint Injection    JOINT REPLACEMENT Right     total knee    SPINAL CORD STIMULATOR IMPLANT Left 2024    pt states is turned off    TRANSFORAMINAL EPIDURAL INJECTION OF STEROID Bilateral 2023    Procedure: Injection,steroid,epidural,transforaminal approach;  Surgeon: Erin Mock MD;  Location: [facility] OR;  Service: Pain Management;  Laterality: Bilateral;  TFESI Left L3, Right L4    TRIAL OF SPINAL CORD NERVE STIMULATOR N/A 10/16/2023    Procedure: TRIAL, NEUROSTIMULATOR, SPINAL CORD;  Surgeon: Erin Mock MD;  Location: [facility] OR;  Service: Pain Management;  Laterality: N/A;  Spincal cord stimulator trail    TUBAL LIGATION         Social History  Ms. Childers  reports that she has quit smoking. Her smoking use included cigarettes. She has never used smokeless tobacco. She reports that she does not currently use alcohol. She reports that she does not use drugs.    Family History  Ms.'s Childers family history includes Heart attack in her mother; Seizures in her father; Stroke in her father.    Medications and Allergies      Medications  Outpatient Medications Marked as Taking for the 12/17/24 encounter (Office Visit) with Erin Mock MD   Medication Sig Dispense Refill    ASHWAGANDHA EXTRACT ORAL Take 1 tablet by mouth Daily.      cholecalciferol, vitamin D3, 125 mcg (5,000 unit) Tab Take 5,000 Units by mouth nightly.      cyclobenzaprine (FLEXERIL) 10 MG tablet Take 10 mg by mouth 3 (three) times daily.      diphenhydrAMINE (BENADRYL) 25 mg capsule Take 25 mg by mouth nightly.      fluticasone propionate (FLONASE) 50 mcg/actuation nasal spray 2 sprays by Each Nostril route as needed.      HYDROcodone-acetaminophen (NORCO) 5-325 mg per tablet Take 1 tablet by mouth 3 (three) times daily.      ibuprofen (ADVIL,MOTRIN) 800 MG tablet Take 800 mg by mouth 3 (three) times daily.      levocetirizine (XYZAL) 5 MG tablet Take 5 mg by mouth Daily.      levothyroxine (SYNTHROID) 25 MCG tablet Take 25 mcg by mouth every morning.      LINZESS 290 mcg Cap capsule Take 290 mcg by mouth every morning.      loratadine (CLARITIN) 10 mg tablet Take 10 mg by mouth once daily.      MAGNESIUM CHLORIDE ORAL Take 1 tablet by mouth nightly.      multivitamin (THERAGRAN) per tablet Take 1 tablet by mouth once daily.      omega-3 fatty acids/fish oil (FISH OIL-OMEGA-3 FATTY ACIDS) 300-1,000 mg capsule Take 1 capsule by mouth 2 (two) times a day.      potassium chloride (MICRO-K) 10 MEQ CpSR Take 10 mEq by mouth once daily.      pregabalin (LYRICA) 100 MG capsule Take 100 mg by mouth 3 (three) times daily.      senna (SENOKOT) 8.6 mg tablet Take 17.2 mg by mouth once daily.      simvastatin (ZOCOR) 20 MG tablet Take 20 mg by mouth every evening.      terbinafine HCL (LAMISIL) 250 mg tablet Take 250 mg by mouth.      tiZANidine (ZANAFLEX) 2 MG tablet Take 2 mg by mouth every evening.      traZODone (DESYREL) 100 MG tablet Take 100 mg by mouth every evening.      venlafaxine (EFFEXOR) 37.5 MG Tab Take 37.5 mg by mouth 2 (two) times daily.      zolpidem  (AMBIEN) 10 mg Tab Take 10 mg by mouth every evening.         Allergies  Review of patient's allergies indicates:  No Known Allergies    Physical Examination     Vitals:    12/17/24 0810   BP: 109/71   Pulse: 89   Resp: 20       Spine Musculoskeletal Exam    Gait    Gait is normal.    Inspection    Cervical Spine    Cervical spine inspection is normal.    Thoracolumbar        Prior incision: midline thoracic    Incision: clean, dry, intact and well-healed    Incisional drainage: none    Palpation    Cervical Spine    Right      Masses: none      Spasms: none      Crepitus: none      Muscle tone: normal    Left      Masses: none      Spasms: none      Crepitus: none      Muscle tone: normal    Thoracolumbar    Tenderness: present      SI Joint: right and left    Range of Motion    Cervical Spine        Cervical spine range of motion additional comments: Limited range of motion in the cervical spine secondary to pain    Thoracolumbar        Thoracolumbar range of motion additional comments: Limited ROM in lumbar spine due to pain.    Strength    Cervical Spine    Cervical spine motor exam is normal.    Thoracolumbar    Thoracolumbar motor exam is normal.       Sensory    Cervical Spine    Cervical spine sensation is normal.    Thoracolumbar    Thoracolumbar sensation is normal.    Special Tests    Thoracolumbar      Right      BRITTNEY test: positive      Gaenslen's: positive      Left      BRITTNEY test: positive      Gaenslen's: positive    General      Constitutional: appears stated age, well-developed and well-nourished    Scleral icterus: no    Labored breathing: no    Psychiatric: normal mood and affect and no acute distress    Neurological: alert and oriented x3    Skin: intact    Lymphadenopathy: none       Assessment and Plan (including Health Maintenance)      Problem List  Smart Sets  Document Outside HM   :    Plan:   DDD (degenerative disc disease), cervical    Degeneration of intervertebral disc of lumbar  region with discogenic back pain and lower extremity pain    Chronic neck pain    Chronic back pain greater than 3 months duration         She is being scheduled for a cervical epidural steroid injection (C7-T1).  The plan was discussed with the patient and she wishes to proceed.    Problem List Items Addressed This Visit          Neuro    DDD (degenerative disc disease), cervical - Primary    Lumbar degenerative disc disease       Orthopedic    Chronic neck pain    Chronic back pain greater than 3 months duration           No future appointments.       There are no Patient Instructions on file for this visit.  No follow-ups on file.     Signature:  Erin Mock MD      Date of encounter: 12/17/24

## 2025-02-03 NOTE — DISCHARGE INSTRUCTIONS
EPIDURAL STEROID INJECTION, CARE AFTER      NO HEAVY LIFTING FOR ONE WEEK  NO HEAT FOR 24 HOURS  APPLY ICE PACK FOR COMFORT TO SITES  REMOVE BAND-AIDS TOMORROW AND THEN YOU MAY SHOWER  NO TUB SOAKING FOR 3-5 DAYS  No aspirin, blood thinners, or NSAIDs for 24 hours.    These instructions give you information on caring for yourself after your procedure. Your doctor may also give you specific instructions. Call your doctor if you have any problems or questions after your procedure.     HOME CARE  Do not drive or use any machinery for the next 24 hours.  Do not do any hard physical activity for the next 24 hours  Do not use a heating pad in area of injection  You may go back to eating as usual    SIDE EFFECTS THAT COULD HAPPEN UP TO 4 HOURS AFTER THE INJECTION  If you have leg weakness or numbness, have someone help you walk. If the weakness or numbness does not go away, or if it gets worse go to the emergency department.  If you have dizziness, lie down right away. This usually helps. Sit up slowly and then when you have been sitting for a few minutes then stand up.  If you have a mild headache, drink fluids (especially drinks with caffeine) Call your doctor if the headache gets worse or persists.  When the numbing medicine wears off you may feel some discomfort where you had the shot. It usually only lasts for a few days. You may put ice over the injection site. Leave ice on for 20 minutes at a time and protect your skin during use.   You may feel minor back pain and stiffness at the site of the shot. Call your doctor if this pain gets worse or does not improve. You may feel nauseous or vomit several hours after your procedure. If this happens, try drinking small amounts of clear liquids until you feel better. If you continue to feel nauseated or continue vomiting, get help right away.    Steroids may take several days to start working. The shot usually helps leg pain more than back pain. The shot will not fix what is  causing the pain but may take away some of the pain. This pain relief may last from 2 weeks to 6 months.     GET HELP RIGHT AWAY IF:  You have very bad pain, headache or neck pain or stiffness  There is a change in your vision (double or blurry vision)  You have a fever over 101 or chills  You have swelling or redness at the injection site  You get weaker  You are not able to control your bladder or bowels  You are not able to urinate

## 2025-02-05 ENCOUNTER — ANESTHESIA (OUTPATIENT)
Dept: SURGERY | Facility: HOSPITAL | Age: 60
End: 2025-02-05
Payer: MEDICARE

## 2025-02-05 ENCOUNTER — ANESTHESIA EVENT (OUTPATIENT)
Dept: SURGERY | Facility: HOSPITAL | Age: 60
End: 2025-02-05
Payer: MEDICARE

## 2025-02-05 ENCOUNTER — HOSPITAL ENCOUNTER (OUTPATIENT)
Facility: HOSPITAL | Age: 60
Discharge: HOME | End: 2025-02-05
Attending: ANESTHESIOLOGY | Admitting: ANESTHESIOLOGY
Payer: MEDICARE

## 2025-02-05 DIAGNOSIS — G89.29 CHRONIC NECK PAIN: Primary | ICD-10-CM

## 2025-02-05 DIAGNOSIS — M54.2 CHRONIC NECK PAIN: Primary | ICD-10-CM

## 2025-02-05 PROCEDURE — 63600175 PHARM REV CODE 636 W HCPCS: Performed by: NURSE ANESTHETIST, CERTIFIED REGISTERED

## 2025-02-05 PROCEDURE — 62323 NJX INTERLAMINAR LMBR/SAC: CPT | Performed by: ANESTHESIOLOGY

## 2025-02-05 PROCEDURE — 37000008 HC ANESTHESIA 1ST 15 MINUTES: Performed by: ANESTHESIOLOGY

## 2025-02-05 PROCEDURE — 63600175 PHARM REV CODE 636 W HCPCS: Performed by: ANESTHESIOLOGY

## 2025-02-05 PROCEDURE — 25000003 PHARM REV CODE 250: Performed by: ANESTHESIOLOGY

## 2025-02-05 PROCEDURE — D9220A PRA ANESTHESIA: Mod: ,,, | Performed by: NURSE ANESTHETIST, CERTIFIED REGISTERED

## 2025-02-05 PROCEDURE — 62323 NJX INTERLAMINAR LMBR/SAC: CPT | Mod: ,,, | Performed by: ANESTHESIOLOGY

## 2025-02-05 PROCEDURE — A4216 STERILE WATER/SALINE, 10 ML: HCPCS | Performed by: ANESTHESIOLOGY

## 2025-02-05 RX ORDER — LIDOCAINE HYDROCHLORIDE 10 MG/ML
INJECTION, SOLUTION EPIDURAL; INFILTRATION; INTRACAUDAL; PERINEURAL
Status: DISCONTINUED
Start: 2025-02-05 | End: 2025-02-05 | Stop reason: HOSPADM

## 2025-02-05 RX ORDER — LIDOCAINE HYDROCHLORIDE 10 MG/ML
INJECTION, SOLUTION EPIDURAL; INFILTRATION; INTRACAUDAL; PERINEURAL
Status: DISCONTINUED | OUTPATIENT
Start: 2025-02-05 | End: 2025-02-05 | Stop reason: HOSPADM

## 2025-02-05 RX ORDER — SODIUM CHLORIDE 0.9 % (FLUSH) 0.9 %
SYRINGE (ML) INJECTION
Status: DISCONTINUED | OUTPATIENT
Start: 2025-02-05 | End: 2025-02-05 | Stop reason: HOSPADM

## 2025-02-05 RX ORDER — PROPOFOL 10 MG/ML
VIAL (ML) INTRAVENOUS
Status: DISCONTINUED | OUTPATIENT
Start: 2025-02-05 | End: 2025-02-05

## 2025-02-05 RX ORDER — DEXAMETHASONE SODIUM PHOSPHATE 10 MG/ML
INJECTION, SOLUTION INTRA-ARTICULAR; INTRALESIONAL; INTRAMUSCULAR; INTRAVENOUS; SOFT TISSUE
Status: DISCONTINUED
Start: 2025-02-05 | End: 2025-02-05 | Stop reason: HOSPADM

## 2025-02-05 RX ORDER — DEXAMETHASONE SODIUM PHOSPHATE 10 MG/ML
INJECTION, SOLUTION INTRA-ARTICULAR; INTRALESIONAL; INTRAMUSCULAR; INTRAVENOUS; SOFT TISSUE
Status: DISCONTINUED | OUTPATIENT
Start: 2025-02-05 | End: 2025-02-05 | Stop reason: HOSPADM

## 2025-02-05 RX ORDER — LIDOCAINE HYDROCHLORIDE 20 MG/ML
INJECTION INTRAVENOUS
Status: DISCONTINUED | OUTPATIENT
Start: 2025-02-05 | End: 2025-02-05

## 2025-02-05 RX ADMIN — PROPOFOL 80 MG: 10 INJECTION, EMULSION INTRAVENOUS at 09:02

## 2025-02-05 RX ADMIN — LIDOCAINE HYDROCHLORIDE 50 MG: 20 INJECTION INTRAVENOUS at 09:02

## 2025-02-05 NOTE — ANESTHESIA PREPROCEDURE EVALUATION
2025  Adrianna Childers is a 59 y.o., female with   -------------------------------------    Anxiety    Arthritis    Chronic back pain    Depression    Digestive disorder    Insomnia    Seasonal allergies    Thyroid disease   Morbid Obesity     And   ----------------------------     section    x 2    Colonoscopy    Epidural steroid injection into cervical spine    Procedure: INJECTION, STEROID, SPINE, CERVICAL, EPIDURAL C7 T1;  Surgeon: Erin Mock MD;  Location: [facility] OR;  Service: Pain Management;  Laterality: N/A;  Cervical Steroid Injection C7-T1    Epidural steroid injection into cervical spine    Procedure: INJECTION, STEROID, SPINE, CERVICAL, EPIDURAL C7 T1;  Surgeon: Erin Mock MD;  Location: [facility] OR;  Service: Pain Management;  Laterality: N/A;  WINIFRED C7-T1    Injection, sacroiliac joint    Procedure: INJECTION,SACROILIAC JOINT Bilateral;  Surgeon: Erin Mock MD;  Location: [facility] OR;  Service: Pain Management;  Laterality: Bilateral;  Bilateral SI Joint Injection    Joint replacement    total knee    Spinal cord stimulator implant    pt states is turned off    Transforaminal epidural injection of steroid    Procedure: Injection,steroid,epidural,transforaminal approach;  Surgeon: Erin Mock MD;  Location: [facility] OR;  Service: Pain Management;  Laterality: Bilateral;  TFESI Left L3, Right L4    Trial of spinal cord nerve stimulator    Procedure: TRIAL, NEUROSTIMULATOR, SPINAL CORD;  Surgeon: Erin Mock MD;  Location: [facility] OR;  Service: Pain Management;  Laterality: N/A;  Spincal cord stimulator trail    Tubal ligation       Presents for WINIFRED  Many previous injections no anesth issues      Pre-op Assessment    I have reviewed the NPO Status.      Review of Systems  Musculoskeletal:  Arthritis        Arthritis          Neurological:    Neuromuscular Disease,            Arthritis                         Neuromuscular Disease   Psych:  Psychiatric History                  Physical Exam  General: Well nourished, Cooperative, Alert and Oriented    Airway:  Mallampati: III   Mouth Opening: Normal  TM Distance: Normal  Tongue: Normal  Neck ROM: Normal ROM  Neck: Girth Increased    Dental:  Edentulous    Chest/Lungs:  Clear to auscultation, Normal Respiratory Rate    Heart:  Rate: Normal  Rhythm: Regular Rhythm        Anesthesia Plan  Type of Anesthesia, risks & benefits discussed:    Anesthesia Type: Gen Natural Airway  Intra-op Monitoring Plan: Standard ASA Monitors  Post Op Pain Control Plan: IV/PO Opioids PRN  Induction:  IV  Airway Plan: Direct  Informed Consent: Informed consent signed with the Patient and all parties understand the risks and agree with anesthesia plan.  All questions answered. Patient consented to blood products? No  ASA Score: 3  Day of Surgery Review of History & Physical: H&P Update referred to the surgeon/provider.  Anesthesia Plan Notes: Nasal cannula vs facemask supplemental oxygenation   For patients with KARAN/obesity, may consider SuperNoval Nasal CPAP      Ready For Surgery From Anesthesia Perspective.     .

## 2025-02-05 NOTE — DISCHARGE SUMMARY
[facility] Surgical - Periop Services  Discharge Note  Short Stay    Procedure(s) (LRB):  INJECTION, STEROID, SPINE, CERVICAL, EPIDURAL / C7 T1 (N/A)      OUTCOME: Patient tolerated treatment/procedure well without complication and is now ready for discharge.    DISPOSITION: Home or Self Care    FINAL DIAGNOSIS:  <principal problem not specified>    FOLLOWUP: In clinic    DISCHARGE INSTRUCTIONS:  No discharge procedures on file.     TIME SPENT ON DISCHARGE: 5 minutes

## 2025-02-05 NOTE — ANESTHESIA POSTPROCEDURE EVALUATION
Anesthesia Post Evaluation    Patient: Adrianna Childers    Procedure(s) Performed: Procedure(s) (LRB):  INJECTION, STEROID, SPINE, CERVICAL, EPIDURAL / C7 T1 (N/A)    Final Anesthesia Type: general      Patient location during evaluation: OPS  Patient participation: Yes- Able to Participate  Level of consciousness: awake and alert, awake and oriented  Post-procedure vital signs: reviewed and stable  Pain management: adequate  Airway patency: patent    PONV status at discharge: No PONV  Anesthetic complications: no      Cardiovascular status: blood pressure returned to baseline  Respiratory status: unassisted, room air and spontaneous ventilation  Hydration status: euvolemic  Follow-up not needed.              Vitals Value    /64    Temp 36.6 °C (97.9 °F)    Pulse 79    Resp 12    SpO2 95 %          No case tracking events are documented in the log.      Pain/Janice Score: No data recorded

## 2025-02-05 NOTE — H&P
Erin Mock MD          PATIENT NAME: Adrianna Childers  : 1965  DATE: 24  MRN: [id number]       Billing Provider: Erin Mock MD  Level of Service:   Patient PCP Information         Provider PCP Type     Juan Pablo Boo MD General                Reason for Visit / Chief Complaint: Follow-up (Follow up post PT. /Pt reports pain level at a 4, in the lower back with shooting pains down the Rt leg. )         Update PCP   Update Chief Complaint             History of Present Illness / Problem Focused Workflow      Adrianna Childers presents to the clinic with Follow-up (Follow up post PT. /Pt reports pain level at a 4, in the lower back with shooting pains down the Rt leg. )     This is a 59-year-old female who returns to clinic today for follow up of her chronic low back pain as well as neck pain.  She had a [employer] spinal cord stimulator implanted in February with Dr. Martino.  She states that it does help with her leg pain, however she is still complaining of low back pain as well as neck pain.  She sees Dr. Ha and he prescribes Norco, but states that the dose has been weaned and she is now having increased back pain.  She is continuing to work with her spinal cord stimulator representative but states that none of the adjustments her providing any better for relief.  She states that her neck pain is starting to increase and she would like to schedule an injection for that.        Neck Pain      Back Pain     Pain  Associated symptoms include neck pain.   Follow-up  Associated symptoms include neck pain.         Review of Systems      Review of Systems   Musculoskeletal:  Positive for back pain, neck pain and neck stiffness.   Psychiatric/Behavioral:  Positive for sleep disturbance.    All other systems reviewed and are negative.        Medical / Social / Family History           Past Medical History:   Diagnosis Date    Anxiety      Arthritis      Chronic back pain      Depression       Digestive disorder      Insomnia      Seasonal allergies      Thyroid disease                 Past Surgical History:   Procedure Laterality Date     SECTION         x 2    COLONOSCOPY        EPIDURAL STEROID INJECTION INTO CERVICAL SPINE N/A 2023     Procedure: INJECTION, STEROID, SPINE, CERVICAL, EPIDURAL C7 T1;  Surgeon: Erin Mock MD;  Location: [facility] OR;  Service: Pain Management;  Laterality: N/A;  Cervical Steroid Injection C7-T1    EPIDURAL STEROID INJECTION INTO CERVICAL SPINE N/A 2024     Procedure: INJECTION, STEROID, SPINE, CERVICAL, EPIDURAL C7 T1;  Surgeon: Erin Mock MD;  Location: [facility] OR;  Service: Pain Management;  Laterality: N/A;  WINIFRED C7-T1    INJECTION, SACROILIAC JOINT Bilateral 2024     Procedure: INJECTION,SACROILIAC JOINT Bilateral;  Surgeon: Erin Mock MD;  Location: [facility] OR;  Service: Pain Management;  Laterality: Bilateral;  Bilateral SI Joint Injection    JOINT REPLACEMENT Right       total knee    SPINAL CORD STIMULATOR IMPLANT Left 2024     pt states is turned off    TRANSFORAMINAL EPIDURAL INJECTION OF STEROID Bilateral 2023     Procedure: Injection,steroid,epidural,transforaminal approach;  Surgeon: Erin Mock MD;  Location: [facility] OR;  Service: Pain Management;  Laterality: Bilateral;  TFESI Left L3, Right L4    TRIAL OF SPINAL CORD NERVE STIMULATOR N/A 10/16/2023     Procedure: TRIAL, NEUROSTIMULATOR, SPINAL CORD;  Surgeon: Erin Mock MD;  Location: [facility] OR;  Service: Pain Management;  Laterality: N/A;  Spincal cord stimulator trail    TUBAL LIGATION             Social History  Ms. Childers  reports that she has quit smoking. Her smoking use included cigarettes. She has never used smokeless tobacco. She reports that she does not currently use alcohol. She reports that she does not use drugs.     Family History  Ms.'s Childers family history includes Heart attack in her mother; Seizures in her father; Stroke in her  father.     Medications and Allergies      Medications         Outpatient Medications Marked as Taking for the 12/17/24 encounter (Office Visit) with Erin Mock MD   Medication Sig Dispense Refill    ASHWAGANDHA EXTRACT ORAL Take 1 tablet by mouth Daily.        cholecalciferol, vitamin D3, 125 mcg (5,000 unit) Tab Take 5,000 Units by mouth nightly.        cyclobenzaprine (FLEXERIL) 10 MG tablet Take 10 mg by mouth 3 (three) times daily.        diphenhydrAMINE (BENADRYL) 25 mg capsule Take 25 mg by mouth nightly.        fluticasone propionate (FLONASE) 50 mcg/actuation nasal spray 2 sprays by Each Nostril route as needed.        HYDROcodone-acetaminophen (NORCO) 5-325 mg per tablet Take 1 tablet by mouth 3 (three) times daily.        ibuprofen (ADVIL,MOTRIN) 800 MG tablet Take 800 mg by mouth 3 (three) times daily.        levocetirizine (XYZAL) 5 MG tablet Take 5 mg by mouth Daily.        levothyroxine (SYNTHROID) 25 MCG tablet Take 25 mcg by mouth every morning.        LINZESS 290 mcg Cap capsule Take 290 mcg by mouth every morning.        loratadine (CLARITIN) 10 mg tablet Take 10 mg by mouth once daily.        MAGNESIUM CHLORIDE ORAL Take 1 tablet by mouth nightly.        multivitamin (THERAGRAN) per tablet Take 1 tablet by mouth once daily.        omega-3 fatty acids/fish oil (FISH OIL-OMEGA-3 FATTY ACIDS) 300-1,000 mg capsule Take 1 capsule by mouth 2 (two) times a day.        potassium chloride (MICRO-K) 10 MEQ CpSR Take 10 mEq by mouth once daily.        pregabalin (LYRICA) 100 MG capsule Take 100 mg by mouth 3 (three) times daily.        senna (SENOKOT) 8.6 mg tablet Take 17.2 mg by mouth once daily.        simvastatin (ZOCOR) 20 MG tablet Take 20 mg by mouth every evening.        terbinafine HCL (LAMISIL) 250 mg tablet Take 250 mg by mouth.        tiZANidine (ZANAFLEX) 2 MG tablet Take 2 mg by mouth every evening.        traZODone (DESYREL) 100 MG tablet Take 100 mg by mouth every evening.         venlafaxine (EFFEXOR) 37.5 MG Tab Take 37.5 mg by mouth 2 (two) times daily.        zolpidem (AMBIEN) 10 mg Tab Take 10 mg by mouth every evening.             Allergies  Review of patient's allergies indicates:  No Known Allergies     Physical Examination          Vitals:     12/17/24 0810   BP: 109/71   Pulse: 89   Resp: 20         Spine Musculoskeletal Exam     Gait    Gait is normal.     Inspection    Cervical Spine    Cervical spine inspection is normal.    Thoracolumbar        Prior incision: midline thoracic    Incision: clean, dry, intact and well-healed    Incisional drainage: none     Palpation    Cervical Spine    Right      Masses: none      Spasms: none      Crepitus: none      Muscle tone: normal    Left      Masses: none      Spasms: none      Crepitus: none      Muscle tone: normal    Thoracolumbar    Tenderness: present      SI Joint: right and left     Range of Motion    Cervical Spine        Cervical spine range of motion additional comments: Limited range of motion in the cervical spine secondary to pain    Thoracolumbar        Thoracolumbar range of motion additional comments: Limited ROM in lumbar spine due to pain.     Strength    Cervical Spine    Cervical spine motor exam is normal.    Thoracolumbar    Thoracolumbar motor exam is normal.        Sensory    Cervical Spine    Cervical spine sensation is normal.    Thoracolumbar    Thoracolumbar sensation is normal.     Special Tests    Thoracolumbar      Right      BRITTNEY test: positive      Gaenslen's: positive      Left      BRITTNEY test: positive      Gaenslen's: positive     General      Constitutional: appears stated age, well-developed and well-nourished    Scleral icterus: no    Labored breathing: no    Psychiatric: normal mood and affect and no acute distress    Neurological: alert and oriented x3    Skin: intact    Lymphadenopathy: none        Assessment and Plan (including Health Maintenance)       Problem List   Smart Sets   Document  Outside HM   :     Plan:   DDD (degenerative disc disease), cervical     Degeneration of intervertebral disc of lumbar region with discogenic back pain and lower extremity pain     Chronic neck pain     Chronic back pain greater than 3 months duration           She is being scheduled for a cervical epidural steroid injection (C7-T1).  The plan was discussed with the patient and she wishes to proceed.     Problem List Items Addressed This Visit                  Neuro     DDD (degenerative disc disease), cervical - Primary     Lumbar degenerative disc disease          Orthopedic     Chronic neck pain     Chronic back pain greater than 3 months duration

## 2025-02-05 NOTE — TRANSFER OF CARE
"Anesthesia Transfer of Care Note    Patient: Adrianna Childers    Procedure(s) Performed: Procedure(s) (LRB):  INJECTION, STEROID, SPINE, CERVICAL, EPIDURAL / C7 T1 (N/A)    Patient location: OPS    Anesthesia Type: general    Transport from OR: Transported from OR on room air with adequate spontaneous ventilation    Post pain: adequate analgesia    Post assessment: no apparent anesthetic complications    Post vital signs: stable    Level of consciousness: responds to stimulation    Nausea/Vomiting: no nausea/vomiting    Complications: none    Transfer of care protocol was followed      Last vitals: Visit Vitals  /70   Pulse 82   Temp 36.6 °C (97.9 °F) (Oral)   Resp 20   Ht 5' 2" (1.575 m)   Wt 103.5 kg (228 lb 2.8 oz)   SpO2 95%   Breastfeeding No   BMI 41.73 kg/m²     "

## 2025-02-06 VITALS
OXYGEN SATURATION: 94 % | BODY MASS INDEX: 41.99 KG/M2 | SYSTOLIC BLOOD PRESSURE: 92 MMHG | RESPIRATION RATE: 20 BRPM | HEIGHT: 62 IN | TEMPERATURE: 98 F | HEART RATE: 75 BPM | WEIGHT: 228.19 LBS | DIASTOLIC BLOOD PRESSURE: 54 MMHG

## 2025-02-20 ENCOUNTER — OFFICE VISIT (OUTPATIENT)
Facility: CLINIC | Age: 60
End: 2025-02-20
Payer: MEDICARE

## 2025-02-20 VITALS
SYSTOLIC BLOOD PRESSURE: 135 MMHG | DIASTOLIC BLOOD PRESSURE: 85 MMHG | BODY MASS INDEX: 41.22 KG/M2 | WEIGHT: 224 LBS | HEIGHT: 62 IN | HEART RATE: 98 BPM

## 2025-02-20 DIAGNOSIS — M50.30 DDD (DEGENERATIVE DISC DISEASE), CERVICAL: ICD-10-CM

## 2025-02-20 DIAGNOSIS — G89.29 CHRONIC NECK PAIN: ICD-10-CM

## 2025-02-20 DIAGNOSIS — M47.812 CERVICAL SPONDYLOSIS: Primary | ICD-10-CM

## 2025-02-20 DIAGNOSIS — M54.2 CHRONIC NECK PAIN: ICD-10-CM

## 2025-02-20 RX ORDER — HYDROCODONE BITARTRATE AND ACETAMINOPHEN 10; 325 MG/1; MG/1
TABLET ORAL
COMMUNITY

## 2025-02-20 RX ORDER — BISACODYL 10 MG/1
SUPPOSITORY RECTAL
COMMUNITY
Start: 2025-01-31

## 2025-02-20 RX ORDER — OMEPRAZOLE 40 MG/1
CAPSULE, DELAYED RELEASE ORAL
COMMUNITY
Start: 2025-01-31

## 2025-02-20 RX ORDER — LACTULOSE 20 G/20G
POWDER, FOR SOLUTION ORAL
COMMUNITY
Start: 2025-01-31

## 2025-02-20 RX ORDER — KETOCONAZOLE 20 MG/G
CREAM TOPICAL
COMMUNITY

## 2025-02-20 RX ORDER — LACTULOSE 10 G/15ML
30 SOLUTION ORAL; RECTAL 2 TIMES DAILY
COMMUNITY
Start: 2024-12-26

## 2025-02-20 NOTE — PROGRESS NOTES
Erin Mock MD        PATIENT NAME: Adrianna Childers  : 1965  DATE: 25  MRN: [id number]      Billing Provider: Erin Mock MD  Level of Service:   Patient PCP Information       Provider PCP Type    Juan Pablo Boo MD General            Reason for Visit / Chief Complaint: Post-op Evaluation (Post op WINIFRED C7-T1 25 C/O pain level 0, states procedure helped, Taking pain meds)       Update PCP  Update Chief Complaint         History of Present Illness / Problem Focused Workflow     Adrianna Childers presents to the clinic with Post-op Evaluation (Post op WINIFRED C7-T1 25 C/O pain level 0, states procedure helped, Taking pain meds)     This is a 59-year-old female who returns to clinic today for follow up of her chronic low back pain as well as neck pain.  She had a [employer] spinal cord stimulator implanted in 2024 with Dr. Martino.  She states that it does help with her leg pain, however she is still complaining of low back pain as well as neck pain.  She sees Dr. Ha and he prescribes Norco, but states that the dose has been weaned and she is now having increased back pain.  She is continuing to work with her spinal cord stimulator representative but states that none of the adjustments her providing any better for relief.  She underwent a cervical epidural steroid injection a couple weeks ago.  She states that it started working pretty quickly, and her neck pain has essentially resolved.  She currently rates it 0/10 on the NRS.  She is continuing to complain of pain across her lower back.  She is inquiring about radiofrequency ablation.  She states that she just recently completed a CT [facility].      Neck Pain     Back Pain    Pain  Associated symptoms include neck pain.   Follow-up  Associated symptoms include neck pain.       Review of Systems     Review of Systems   Musculoskeletal:  Positive for back pain, neck pain and neck  stiffness.   Psychiatric/Behavioral:  Positive for sleep disturbance.    All other systems reviewed and are negative.       Medical / Social / Family History     Past Medical History:   Diagnosis Date    Anxiety     Arthritis     Chronic back pain     Depression     Digestive disorder     Insomnia     Seasonal allergies     Thyroid disease        Past Surgical History:   Procedure Laterality Date     SECTION      x 2    COLONOSCOPY      EPIDURAL STEROID INJECTION INTO CERVICAL SPINE N/A 2023    Procedure: INJECTION, STEROID, SPINE, CERVICAL, EPIDURAL C7 T1;  Surgeon: Erin Mock MD;  Location: [facility] OR;  Service: Pain Management;  Laterality: N/A;  Cervical Steroid Injection C7-T1    EPIDURAL STEROID INJECTION INTO CERVICAL SPINE N/A 2024    Procedure: INJECTION, STEROID, SPINE, CERVICAL, EPIDURAL C7 T1;  Surgeon: Erin Mock MD;  Location: [facility] OR;  Service: Pain Management;  Laterality: N/A;  WINIFRED C7-T1    EPIDURAL STEROID INJECTION INTO CERVICAL SPINE N/A 2025    Procedure: INJECTION, STEROID, SPINE, CERVICAL, EPIDURAL / C7 T1;  Surgeon: Erin Mock MD;  Location: [facility] OR;  Service: Pain Management;  Laterality: N/A;  WINIFRED C7-T1    INJECTION, SACROILIAC JOINT Bilateral 2024    Procedure: INJECTION,SACROILIAC JOINT Bilateral;  Surgeon: Erin Mock MD;  Location: [facility] OR;  Service: Pain Management;  Laterality: Bilateral;  Bilateral SI Joint Injection    JOINT REPLACEMENT Right     total knee    SPINAL CORD STIMULATOR IMPLANT Left 2024    pt states is turned off    TRANSFORAMINAL EPIDURAL INJECTION OF STEROID Bilateral 2023    Procedure: Injection,steroid,epidural,transforaminal approach;  Surgeon: Erin Mock MD;  Location: [facility] OR;  Service: Pain Management;  Laterality: Bilateral;  TFESI Left L3, Right L4    TRIAL OF SPINAL CORD NERVE STIMULATOR N/A 10/16/2023    Procedure: TRIAL, NEUROSTIMULATOR, SPINAL CORD;  Surgeon: Erin Mock MD;   Location: [facility] OR;  Service: Pain Management;  Laterality: N/A;  Spincal cord stimulator trail    TUBAL LIGATION         Social History  Ms. Childers  reports that she has quit smoking. Her smoking use included cigarettes. She has never used smokeless tobacco. She reports that she does not currently use alcohol. She reports that she does not use drugs.    Family History  Ms.'s Childers family history includes Heart attack in her mother; Seizures in her father; Stroke in her father.    Medications and Allergies     Medications  Outpatient Medications Marked as Taking for the 2/20/25 encounter (Office Visit) with Erin Mock MD   Medication Sig Dispense Refill    ASHWAGANDHA EXTRACT ORAL Take 1 tablet by mouth Daily.      bisacodyL (DULCOLAX, BISACODYL,) 10 mg Supp 2 suppositories rectally with MAg citrate dosing      cholecalciferol, vitamin D3, 125 mcg (5,000 unit) Tab Take 5,000 Units by mouth nightly.      cyclobenzaprine (FLEXERIL) 10 MG tablet Take 10 mg by mouth 3 (three) times daily.      diphenhydrAMINE (BENADRYL) 25 mg capsule Take 25 mg by mouth nightly.      fluticasone propionate (FLONASE) 50 mcg/actuation nasal spray 2 sprays by Each Nostril route as needed.      HYDROcodone-acetaminophen (NORCO)  mg per tablet 1 TABLET THREE TIMES  DAILY PRN      ibuprofen (ADVIL,MOTRIN) 800 MG tablet Take 800 mg by mouth 3 (three) times daily.      ketoconazole (NIZORAL) 2 % cream APPLY TO THE AFFECTED AREA(S) BY TOPICAL ROUTE ONCE DAILY      KRISTALOSE 20 gram Pack MIX AND DRINK 1 PACKET BY MOUTH EVERY DAY      lactulose (CHRONULAC) 10 gram/15 mL solution Take 30 mLs by mouth 2 (two) times daily.      levocetirizine (XYZAL) 5 MG tablet Take 5 mg by mouth Daily.      levothyroxine (SYNTHROID) 25 MCG tablet Take 25 mcg by mouth every morning.      LINZESS 290 mcg Cap capsule Take 290 mcg by mouth every morning.      loratadine (CLARITIN) 10 mg tablet Take 10 mg by mouth once daily.      MAGNESIUM CHLORIDE ORAL  Take 1 tablet by mouth nightly.      MOVANTIK 25 mg tablet Take 25 mg by mouth.      multivitamin (THERAGRAN) per tablet Take 1 tablet by mouth once daily.      omega-3 fatty acids/fish oil (FISH OIL-OMEGA-3 FATTY ACIDS) 300-1,000 mg capsule Take 1 capsule by mouth 2 (two) times a day.      omeprazole (PRILOSEC) 40 MG capsule Take 1 capsule every day by oral route.      potassium bicarbonate (K-LYTE) disintegrating tablet Take 99 mEq by mouth nightly.      pregabalin (LYRICA) 100 MG capsule Take 100 mg by mouth 3 (three) times daily.      senna (SENOKOT) 8.6 mg tablet Take 17.2 mg by mouth once daily.      simvastatin (ZOCOR) 20 MG tablet Take 20 mg by mouth every evening.      traZODone (DESYREL) 100 MG tablet Take 100 mg by mouth every evening.      venlafaxine (EFFEXOR) 37.5 MG Tab Take 37.5 mg by mouth 2 (two) times daily.      zolpidem (AMBIEN) 10 mg Tab Take 10 mg by mouth every evening.      [DISCONTINUED] HYDROcodone-acetaminophen (NORCO) 5-325 mg per tablet Take 1 tablet by mouth 3 (three) times daily.         Allergies  Review of patient's allergies indicates:  No Known Allergies    Physical Examination     Vitals:    02/20/25 0747   BP: 135/85   Pulse: 98       Spine Musculoskeletal Exam    Gait    Gait is normal.    Inspection    Cervical Spine    Cervical spine inspection is normal.    Thoracolumbar        Prior incision: midline thoracic    Incision: clean, dry, intact and well-healed    Incisional drainage: none    Palpation    Cervical Spine    Right      Masses: none      Spasms: none      Crepitus: none      Muscle tone: normal    Left      Masses: none      Spasms: none      Crepitus: none      Muscle tone: normal    Thoracolumbar    Tenderness: present      SI Joint: right and left    Range of Motion    Cervical Spine        Cervical spine range of motion additional comments: Limited range of motion in the cervical spine secondary to pain    Thoracolumbar        Thoracolumbar range of motion  additional comments: Limited ROM in lumbar spine due to pain.    Strength    Cervical Spine    Cervical spine motor exam is normal.    Thoracolumbar    Thoracolumbar motor exam is normal.       Sensory    Cervical Spine    Cervical spine sensation is normal.    Thoracolumbar    Thoracolumbar sensation is normal.    Special Tests    Thoracolumbar      Right      BRITTNEY test: positive      Gaenslen's: positive      Left      BRITTNEY test: positive      Gaenslen's: positive    General      Constitutional: appears stated age, well-developed and well-nourished    Scleral icterus: no    Labored breathing: no    Psychiatric: normal mood and affect and no acute distress    Neurological: alert and oriented x3    Skin: intact    Lymphadenopathy: none  CV: extremities warm, well-perfused  Resp: nonlabored breathing       Assessment and Plan (including Health Maintenance)      Problem List  Smart Sets  Document Outside HM   :    Plan:   Cervical spondylosis    DDD (degenerative disc disease), cervical    Chronic neck pain      I am requesting the CT of the lumbar spine from the hospital in [place].  Once I have reviewed it, I will let her know about possible medial branch blocks.  She does have a spinal cord stimulator and I encouraged her to meet with a representative for reprogramming.    Problem List Items Addressed This Visit          Neuro    DDD (degenerative disc disease), cervical    Cervical spondylosis - Primary       Orthopedic    Chronic neck pain           No future appointments.       There are no Patient Instructions on file for this visit.  No follow-ups on file.     Signature:  Erin Mock MD      Date of encounter: 2/20/25

## 2025-03-11 ENCOUNTER — TELEPHONE (OUTPATIENT)
Facility: CLINIC | Age: 60
End: 2025-03-11
Payer: MEDICARE

## 2025-03-11 DIAGNOSIS — M46.1 SACROILIITIS: Primary | ICD-10-CM

## 2025-03-11 NOTE — TELEPHONE ENCOUNTER
Dr Mock I have forwarded C-Spine CT report to you through , please advise if anything needs to be done.

## 2025-03-12 ENCOUNTER — HOSPITAL ENCOUNTER (OUTPATIENT)
Dept: RADIOLOGY | Facility: HOSPITAL | Age: 60
Discharge: HOME | End: 2025-03-12
Attending: ANESTHESIOLOGY
Payer: MEDICARE

## 2025-03-12 DIAGNOSIS — M46.1 SACROILIITIS: ICD-10-CM

## 2025-03-12 PROCEDURE — 72202 X-RAY EXAM SI JOINTS 3/> VWS: CPT | Mod: TC

## 2025-03-17 ENCOUNTER — RESULTS FOLLOW-UP (OUTPATIENT)
Facility: CLINIC | Age: 60
End: 2025-03-17

## 2025-03-19 ENCOUNTER — TELEPHONE (OUTPATIENT)
Facility: CLINIC | Age: 60
End: 2025-03-19
Payer: MEDICARE

## 2025-03-19 DIAGNOSIS — M46.1 SACROILIITIS: ICD-10-CM

## 2025-03-19 DIAGNOSIS — M54.9 CHRONIC BACK PAIN GREATER THAN 3 MONTHS DURATION: Primary | ICD-10-CM

## 2025-03-19 DIAGNOSIS — G89.29 CHRONIC BACK PAIN GREATER THAN 3 MONTHS DURATION: Primary | ICD-10-CM

## 2025-03-19 DIAGNOSIS — M51.360 DEGENERATION OF INTERVERTEBRAL DISC OF LUMBAR REGION WITH DISCOGENIC BACK PAIN: ICD-10-CM

## 2025-03-19 NOTE — TELEPHONE ENCOUNTER
----- Message from Med Assistant Emely sent at 3/17/2025 11:46 AM CDT -----  Pt states she would like to go to [facility]  ----- Message -----  From: Erin Mock MD  Sent: 3/17/2025   8:54 AM CDT  To: Kristina Marques MA; Emely Martinez MA  Subject: xR                                               Please let pt know I reviewed her XR, SI joints look ok.  I would recommend dry needling.  Can place a referral if she would like to.  ----- Message -----  From: Interface, Rad Results In  Sent: 3/13/2025   6:15 PM CDT  To: Erin Mock MD

## 2025-06-10 ENCOUNTER — OFFICE VISIT (OUTPATIENT)
Facility: CLINIC | Age: 60
End: 2025-06-10
Payer: MEDICARE

## 2025-06-10 VITALS
SYSTOLIC BLOOD PRESSURE: 115 MMHG | HEART RATE: 81 BPM | BODY MASS INDEX: 41.22 KG/M2 | DIASTOLIC BLOOD PRESSURE: 71 MMHG | HEIGHT: 62 IN | WEIGHT: 224 LBS

## 2025-06-10 DIAGNOSIS — G89.29 CHRONIC BACK PAIN GREATER THAN 3 MONTHS DURATION: ICD-10-CM

## 2025-06-10 DIAGNOSIS — M51.362 DEGENERATION OF INTERVERTEBRAL DISC OF LUMBAR REGION WITH DISCOGENIC BACK PAIN AND LOWER EXTREMITY PAIN: ICD-10-CM

## 2025-06-10 DIAGNOSIS — M54.9 CHRONIC BACK PAIN GREATER THAN 3 MONTHS DURATION: ICD-10-CM

## 2025-06-10 DIAGNOSIS — M46.1 SACROILIITIS: Primary | ICD-10-CM

## 2025-06-10 DIAGNOSIS — M54.16 LUMBAR RADICULITIS: ICD-10-CM

## 2025-06-10 DIAGNOSIS — M53.3 SACROILIAC JOINT PAIN: ICD-10-CM

## 2025-06-10 PROCEDURE — 99213 OFFICE O/P EST LOW 20 MIN: CPT | Mod: ,,, | Performed by: ANESTHESIOLOGY

## 2025-06-10 PROCEDURE — 1160F RVW MEDS BY RX/DR IN RCRD: CPT | Mod: CPTII,,, | Performed by: ANESTHESIOLOGY

## 2025-06-10 PROCEDURE — 1159F MED LIST DOCD IN RCRD: CPT | Mod: CPTII,,, | Performed by: ANESTHESIOLOGY

## 2025-06-10 PROCEDURE — 3008F BODY MASS INDEX DOCD: CPT | Mod: CPTII,,, | Performed by: ANESTHESIOLOGY

## 2025-06-10 PROCEDURE — 3074F SYST BP LT 130 MM HG: CPT | Mod: CPTII,,, | Performed by: ANESTHESIOLOGY

## 2025-06-10 PROCEDURE — 3078F DIAST BP <80 MM HG: CPT | Mod: CPTII,,, | Performed by: ANESTHESIOLOGY

## 2025-06-10 RX ORDER — CYANOCOBALAMIN (VITAMIN B-12) 50 MCG
LOZENGE ORAL
COMMUNITY

## 2025-06-10 RX ORDER — MULTIVIT,IRON,MINERALS/LUTEIN
TABLET ORAL
COMMUNITY

## 2025-06-10 RX ORDER — OMEGA-3/DHA/EPA/FISH OIL 300-1000MG
CAPSULE,DELAYED RELEASE (ENTERIC COATED) ORAL
COMMUNITY

## 2025-06-10 RX ORDER — ASCORBIC ACID 250 MG
TABLET,CHEWABLE ORAL
COMMUNITY

## 2025-06-10 NOTE — H&P (VIEW-ONLY)
Erin Mock MD        PATIENT NAME: Adrianna Childers  : 1965  DATE: 6/10/25  MRN: [id number]      Billing Provider: Erin Mock MD  Level of Service:   Patient PCP Information       Provider PCP Type    Juan Pablo Boo MD General            Reason for Visit / Chief Complaint: Neck Pain (patient requesting to come in and see what the next step is the dry needling is not helping./Radiation to shoulders and all the way down the arm /Pain level today 2/10, when walking 10/10)       Update PCP  Update Chief Complaint         History of Present Illness / Problem Focused Workflow     Adrianna Childers presents to the clinic with Neck Pain (patient requesting to come in and see what the next step is the dry needling is not helping./Radiation to shoulders and all the way down the arm /Pain level today 2/10, when walking 10/10)     This is a 59-year-old female who returns to clinic today for follow up of her chronic low back pain as well as neck pain.  She had a PureSense spinal cord stimulator implanted in 2024 with Dr. Martino.  She states that she has had to stop using it because it was making her sciatica worse.  She sees Dr. Ha and he prescribes Norco, but states that the dose has been weaned and she is now having increased back pain.  She is continuing to work with her spinal cord stimulator representative but states that none of the adjustments her providing any better for relief.  She underwent a cervical epidural steroid injection a couple weeks ago.  She states that it started working pretty quickly, and her neck pain essentially resolved.        Neck Pain     Back Pain    Pain  Associated symptoms include neck pain.   Follow-up  Associated symptoms include neck pain.       Review of Systems     Review of Systems   Musculoskeletal:  Positive for back pain, neck pain and neck stiffness.   Psychiatric/Behavioral:  Positive for sleep disturbance.    All other systems reviewed and  are negative.       Medical / Social / Family History     Past Medical History:   Diagnosis Date    Anxiety     Arthritis     Chronic back pain     Depression     Digestive disorder     Insomnia     Seasonal allergies     Thyroid disease        Past Surgical History:   Procedure Laterality Date     SECTION      x 2    COLONOSCOPY      EPIDURAL STEROID INJECTION INTO CERVICAL SPINE N/A 2023    Procedure: INJECTION, STEROID, SPINE, CERVICAL, EPIDURAL C7 T1;  Surgeon: Erin Mock MD;  Location: [facility] OR;  Service: Pain Management;  Laterality: N/A;  Cervical Steroid Injection C7-T1    EPIDURAL STEROID INJECTION INTO CERVICAL SPINE N/A 2024    Procedure: INJECTION, STEROID, SPINE, CERVICAL, EPIDURAL C7 T1;  Surgeon: Erin Mock MD;  Location: [facility] OR;  Service: Pain Management;  Laterality: N/A;  WINIFRED C7-T1    EPIDURAL STEROID INJECTION INTO CERVICAL SPINE N/A 2025    Procedure: INJECTION, STEROID, SPINE, CERVICAL, EPIDURAL / C7 T1;  Surgeon: Erin Mock MD;  Location: [facility] OR;  Service: Pain Management;  Laterality: N/A;  WINIFRED C7-T1    INJECTION, SACROILIAC JOINT Bilateral 2024    Procedure: INJECTION,SACROILIAC JOINT Bilateral;  Surgeon: Erin Mock MD;  Location: [facility] OR;  Service: Pain Management;  Laterality: Bilateral;  Bilateral SI Joint Injection    JOINT REPLACEMENT Right     total knee    SPINAL CORD STIMULATOR IMPLANT Left 2024    pt states is turned off    TRANSFORAMINAL EPIDURAL INJECTION OF STEROID Bilateral 2023    Procedure: Injection,steroid,epidural,transforaminal approach;  Surgeon: rEin Mock MD;  Location: [facility] OR;  Service: Pain Management;  Laterality: Bilateral;  TFESI Left L3, Right L4    TRIAL OF SPINAL CORD NERVE STIMULATOR N/A 10/16/2023    Procedure: TRIAL, NEUROSTIMULATOR, SPINAL CORD;  Surgeon: Erin Mock MD;  Location: [facility] OR;  Service: Pain Management;  Laterality: N/A;  Spincal cord stimulator trail    TUBAL  LIGATION         Social History  Ms. Childers  reports that she has quit smoking. Her smoking use included cigarettes. She has never used smokeless tobacco. She reports that she does not currently use alcohol. She reports that she does not use drugs.    Family History  Ms.'s Childers family history includes Heart attack in her mother; Seizures in her father; Stroke in her father.    Medications and Allergies     Medications  Outpatient Medications Marked as Taking for the 6/10/25 encounter (Office Visit) with Erin Mock MD   Medication Sig Dispense Refill    ascorbic acid, vitamin C, (VITAMIN C) 250 mg Chew Take by mouth.      ASHWAGANDHA EXTRACT ORAL Take 1 tablet by mouth Daily.      bisacodyL (DULCOLAX, BISACODYL,) 10 mg Supp 2 suppositories rectally with MAg citrate dosing      cholecalciferol, vitamin D3, 125 mcg (5,000 unit) Tab Take 5,000 Units by mouth nightly.      cyanocobalamin, vitamin B-12, (VITAMIN B-12) 50 mcg Lozg Take by mouth.      cyclobenzaprine (FLEXERIL) 10 MG tablet Take 10 mg by mouth 3 (three) times daily.      diphenhydrAMINE (BENADRYL) 25 mg capsule Take 25 mg by mouth nightly.      fish,bora,flax oils-om3,6,9no1 (TRIPLE OMEGA 3-6-9) 400-400-400 mg Cap Take by mouth.      fluticasone propionate (FLONASE) 50 mcg/actuation nasal spray 2 sprays by Each Nostril route as needed.      HYDROcodone-acetaminophen (NORCO)  mg per tablet 1 TABLET THREE TIMES  DAILY PRN      ketoconazole (NIZORAL) 2 % cream APPLY TO THE AFFECTED AREA(S) BY TOPICAL ROUTE ONCE DAILY      KRISTALOSE 20 gram Pack MIX AND DRINK 1 PACKET BY MOUTH EVERY DAY      lactulose (CHRONULAC) 10 gram/15 mL solution Take 30 mLs by mouth 2 (two) times daily.      levocetirizine (XYZAL) 5 MG tablet Take 5 mg by mouth Daily.      levothyroxine (SYNTHROID) 25 MCG tablet Take 25 mcg by mouth every morning.      LINZESS 290 mcg Cap capsule Take 290 mcg by mouth every morning.      loratadine (CLARITIN) 10 mg tablet Take 10 mg by  mouth once daily.      MAGNESIUM CHLORIDE ORAL Take 1 tablet by mouth nightly.      MOVANTIK 25 mg tablet Take 25 mg by mouth.      multivit-min-iron-FA-vit K-lut (CENTRUM SILVER WOMEN) 8 mg iron-400 mcg-50 mcg Tab Take by mouth.      multivitamin (THERAGRAN) per tablet Take 1 tablet by mouth once daily.      omega-3 fatty acids/fish oil (FISH OIL-OMEGA-3 FATTY ACIDS) 300-1,000 mg capsule Take 1 capsule by mouth 2 (two) times a day.      omeprazole (PRILOSEC) 40 MG capsule Take 1 capsule every day by oral route.      potassium bicarbonate (K-LYTE) disintegrating tablet Take 99 mEq by mouth nightly.      senna (SENOKOT) 8.6 mg tablet Take 17.2 mg by mouth once daily.      simvastatin (ZOCOR) 20 MG tablet Take 20 mg by mouth every evening.      traZODone (DESYREL) 100 MG tablet Take 100 mg by mouth every evening.      venlafaxine (EFFEXOR) 37.5 MG Tab Take 37.5 mg by mouth 2 (two) times daily.      zolpidem (AMBIEN) 10 mg Tab Take 10 mg by mouth every evening.         Allergies  Review of patient's allergies indicates:  No Known Allergies    Physical Examination     Vitals:    06/10/25 1028   BP: 115/71   Pulse: 81       Spine Musculoskeletal Exam    Gait    Gait is normal.    Inspection    Cervical Spine    Cervical spine inspection is normal.    Thoracolumbar        Prior incision: midline thoracic    Incision: clean, dry, intact and well-healed    Incisional drainage: none    Palpation    Cervical Spine    Right      Masses: none      Spasms: none      Crepitus: none      Muscle tone: normal    Left      Masses: none      Spasms: none      Crepitus: none      Muscle tone: normal    Thoracolumbar    Tenderness: present      SI Joint: right and left    Range of Motion    Cervical Spine        Cervical spine range of motion additional comments: Limited range of motion in the cervical spine secondary to pain    Thoracolumbar        Thoracolumbar range of motion additional comments: Limited ROM in lumbar spine due to  pain.    Strength    Cervical Spine    Cervical spine motor exam is normal.    Thoracolumbar    Thoracolumbar motor exam is normal.       Sensory    Cervical Spine    Cervical spine sensation is normal.    Thoracolumbar    Thoracolumbar sensation is normal.    Special Tests    Thoracolumbar      Right      BRITTNEY test: positive      Gaenslen's: positive      Left      BRITTNEY test: positive      Gaenslen's: positive    General      Constitutional: appears stated age, well-developed and well-nourished    Scleral icterus: no    Labored breathing: no    Psychiatric: normal mood and affect and no acute distress    Neurological: alert and oriented x3    Skin: intact    Lymphadenopathy: none  CV: extremities warm, well-perfused  Resp: nonlabored breathing       Assessment and Plan (including Health Maintenance)      Problem List  Smart Sets  Document Outside HM   :    Plan:   Degeneration of intervertebral disc of lumbar region with discogenic back pain and lower extremity pain    Lumbar radiculitis    Lumbar spondylosis    Chronic back pain greater than 3 months duration      She is being scheduled for bilateral sacroiliac joint injections today to help with her chronic pain related to sacroiliitis.  The plan was discussed with the patient and she wishes to proceed.    Problem List Items Addressed This Visit          Neuro    Lumbar degenerative disc disease - Primary    Lumbar spondylosis    Lumbar radiculitis       Orthopedic    Chronic back pain greater than 3 months duration           Future Appointments   Date Time Provider Department Center   7/9/2025  8:30 AM Cassie Richards, NP [facility]          There are no Patient Instructions on file for this visit.  No follow-ups on file.     Signature:  Erin Mock MD      Date of encounter: 6/10/25

## 2025-06-10 NOTE — PROGRESS NOTES
Erin Mock MD        PATIENT NAME: Adrianna Childers  : 1965  DATE: 6/10/25  MRN: [id number]      Billing Provider: Erin Mock MD  Level of Service:   Patient PCP Information       Provider PCP Type    Juan Pablo Boo MD General            Reason for Visit / Chief Complaint: Neck Pain (patient requesting to come in and see what the next step is the dry needling is not helping./Radiation to shoulders and all the way down the arm /Pain level today 2/10, when walking 10/10)       Update PCP  Update Chief Complaint         History of Present Illness / Problem Focused Workflow     Adrianna Childers presents to the clinic with Neck Pain (patient requesting to come in and see what the next step is the dry needling is not helping./Radiation to shoulders and all the way down the arm /Pain level today 2/10, when walking 10/10)     This is a 59-year-old female who returns to clinic today for follow up of her chronic low back pain as well as neck pain.  She had a [employer] spinal cord stimulator implanted in 2024 with Dr. Martino.  She states that she has had to stop using it because it was making her sciatica worse.  She sees Dr. Ha and he prescribes Norco, but states that the dose has been weaned and she is now having increased back pain.  She is continuing to work with her spinal cord stimulator representative but states that none of the adjustments her providing any better for relief.  She underwent a cervical epidural steroid injection a couple weeks ago.  She states that it started working pretty quickly, and her neck pain essentially resolved.        Neck Pain     Back Pain    Pain  Associated symptoms include neck pain.   Follow-up  Associated symptoms include neck pain.       Review of Systems     Review of Systems   Musculoskeletal:  Positive for back pain, neck pain and neck stiffness.   Psychiatric/Behavioral:  Positive for sleep disturbance.    All other systems reviewed and  are negative.       Medical / Social / Family History     Past Medical History:   Diagnosis Date    Anxiety     Arthritis     Chronic back pain     Depression     Digestive disorder     Insomnia     Seasonal allergies     Thyroid disease        Past Surgical History:   Procedure Laterality Date     SECTION      x 2    COLONOSCOPY      EPIDURAL STEROID INJECTION INTO CERVICAL SPINE N/A 2023    Procedure: INJECTION, STEROID, SPINE, CERVICAL, EPIDURAL C7 T1;  Surgeon: Erin Mock MD;  Location: [facility] OR;  Service: Pain Management;  Laterality: N/A;  Cervical Steroid Injection C7-T1    EPIDURAL STEROID INJECTION INTO CERVICAL SPINE N/A 2024    Procedure: INJECTION, STEROID, SPINE, CERVICAL, EPIDURAL C7 T1;  Surgeon: Erin Mock MD;  Location: [facility] OR;  Service: Pain Management;  Laterality: N/A;  WINIFRED C7-T1    EPIDURAL STEROID INJECTION INTO CERVICAL SPINE N/A 2025    Procedure: INJECTION, STEROID, SPINE, CERVICAL, EPIDURAL / C7 T1;  Surgeon: Erin Mock MD;  Location: [facility] OR;  Service: Pain Management;  Laterality: N/A;  WINIFRED C7-T1    INJECTION, SACROILIAC JOINT Bilateral 2024    Procedure: INJECTION,SACROILIAC JOINT Bilateral;  Surgeon: Erin Mock MD;  Location: [facility] OR;  Service: Pain Management;  Laterality: Bilateral;  Bilateral SI Joint Injection    JOINT REPLACEMENT Right     total knee    SPINAL CORD STIMULATOR IMPLANT Left 2024    pt states is turned off    TRANSFORAMINAL EPIDURAL INJECTION OF STEROID Bilateral 2023    Procedure: Injection,steroid,epidural,transforaminal approach;  Surgeon: Erin Mock MD;  Location: [facility] OR;  Service: Pain Management;  Laterality: Bilateral;  TFESI Left L3, Right L4    TRIAL OF SPINAL CORD NERVE STIMULATOR N/A 10/16/2023    Procedure: TRIAL, NEUROSTIMULATOR, SPINAL CORD;  Surgeon: Erin Mock MD;  Location: [facility] OR;  Service: Pain Management;  Laterality: N/A;  Spincal cord stimulator trail    TUBAL  LIGATION         Social History  Ms. Childers  reports that she has quit smoking. Her smoking use included cigarettes. She has never used smokeless tobacco. She reports that she does not currently use alcohol. She reports that she does not use drugs.    Family History  Ms.'s Childers family history includes Heart attack in her mother; Seizures in her father; Stroke in her father.    Medications and Allergies     Medications  Outpatient Medications Marked as Taking for the 6/10/25 encounter (Office Visit) with Erin Mock MD   Medication Sig Dispense Refill    ascorbic acid, vitamin C, (VITAMIN C) 250 mg Chew Take by mouth.      ASHWAGANDHA EXTRACT ORAL Take 1 tablet by mouth Daily.      bisacodyL (DULCOLAX, BISACODYL,) 10 mg Supp 2 suppositories rectally with MAg citrate dosing      cholecalciferol, vitamin D3, 125 mcg (5,000 unit) Tab Take 5,000 Units by mouth nightly.      cyanocobalamin, vitamin B-12, (VITAMIN B-12) 50 mcg Lozg Take by mouth.      cyclobenzaprine (FLEXERIL) 10 MG tablet Take 10 mg by mouth 3 (three) times daily.      diphenhydrAMINE (BENADRYL) 25 mg capsule Take 25 mg by mouth nightly.      fish,bora,flax oils-om3,6,9no1 (TRIPLE OMEGA 3-6-9) 400-400-400 mg Cap Take by mouth.      fluticasone propionate (FLONASE) 50 mcg/actuation nasal spray 2 sprays by Each Nostril route as needed.      HYDROcodone-acetaminophen (NORCO)  mg per tablet 1 TABLET THREE TIMES  DAILY PRN      ketoconazole (NIZORAL) 2 % cream APPLY TO THE AFFECTED AREA(S) BY TOPICAL ROUTE ONCE DAILY      KRISTALOSE 20 gram Pack MIX AND DRINK 1 PACKET BY MOUTH EVERY DAY      lactulose (CHRONULAC) 10 gram/15 mL solution Take 30 mLs by mouth 2 (two) times daily.      levocetirizine (XYZAL) 5 MG tablet Take 5 mg by mouth Daily.      levothyroxine (SYNTHROID) 25 MCG tablet Take 25 mcg by mouth every morning.      LINZESS 290 mcg Cap capsule Take 290 mcg by mouth every morning.      loratadine (CLARITIN) 10 mg tablet Take 10 mg by  mouth once daily.      MAGNESIUM CHLORIDE ORAL Take 1 tablet by mouth nightly.      MOVANTIK 25 mg tablet Take 25 mg by mouth.      multivit-min-iron-FA-vit K-lut (CENTRUM SILVER WOMEN) 8 mg iron-400 mcg-50 mcg Tab Take by mouth.      multivitamin (THERAGRAN) per tablet Take 1 tablet by mouth once daily.      omega-3 fatty acids/fish oil (FISH OIL-OMEGA-3 FATTY ACIDS) 300-1,000 mg capsule Take 1 capsule by mouth 2 (two) times a day.      omeprazole (PRILOSEC) 40 MG capsule Take 1 capsule every day by oral route.      potassium bicarbonate (K-LYTE) disintegrating tablet Take 99 mEq by mouth nightly.      senna (SENOKOT) 8.6 mg tablet Take 17.2 mg by mouth once daily.      simvastatin (ZOCOR) 20 MG tablet Take 20 mg by mouth every evening.      traZODone (DESYREL) 100 MG tablet Take 100 mg by mouth every evening.      venlafaxine (EFFEXOR) 37.5 MG Tab Take 37.5 mg by mouth 2 (two) times daily.      zolpidem (AMBIEN) 10 mg Tab Take 10 mg by mouth every evening.         Allergies  Review of patient's allergies indicates:  No Known Allergies    Physical Examination     Vitals:    06/10/25 1028   BP: 115/71   Pulse: 81       Spine Musculoskeletal Exam    Gait    Gait is normal.    Inspection    Cervical Spine    Cervical spine inspection is normal.    Thoracolumbar        Prior incision: midline thoracic    Incision: clean, dry, intact and well-healed    Incisional drainage: none    Palpation    Cervical Spine    Right      Masses: none      Spasms: none      Crepitus: none      Muscle tone: normal    Left      Masses: none      Spasms: none      Crepitus: none      Muscle tone: normal    Thoracolumbar    Tenderness: present      SI Joint: right and left    Range of Motion    Cervical Spine        Cervical spine range of motion additional comments: Limited range of motion in the cervical spine secondary to pain    Thoracolumbar        Thoracolumbar range of motion additional comments: Limited ROM in lumbar spine due to  pain.    Strength    Cervical Spine    Cervical spine motor exam is normal.    Thoracolumbar    Thoracolumbar motor exam is normal.       Sensory    Cervical Spine    Cervical spine sensation is normal.    Thoracolumbar    Thoracolumbar sensation is normal.    Special Tests    Thoracolumbar      Right      BRITTNEY test: positive      Gaenslen's: positive      Left      BRITTNEY test: positive      Gaenslen's: positive    General      Constitutional: appears stated age, well-developed and well-nourished    Scleral icterus: no    Labored breathing: no    Psychiatric: normal mood and affect and no acute distress    Neurological: alert and oriented x3    Skin: intact    Lymphadenopathy: none  CV: extremities warm, well-perfused  Resp: nonlabored breathing       Assessment and Plan (including Health Maintenance)      Problem List  Smart Sets  Document Outside HM   :    Plan:   Degeneration of intervertebral disc of lumbar region with discogenic back pain and lower extremity pain    Lumbar radiculitis    Lumbar spondylosis    Chronic back pain greater than 3 months duration      She is being scheduled for bilateral sacroiliac joint injections today to help with her chronic pain related to sacroiliitis.  The plan was discussed with the patient and she wishes to proceed.    Problem List Items Addressed This Visit          Neuro    Lumbar degenerative disc disease - Primary    Lumbar spondylosis    Lumbar radiculitis       Orthopedic    Chronic back pain greater than 3 months duration           Future Appointments   Date Time Provider Department Center   7/9/2025  8:30 AM Cassie Richards, NP [facility]          There are no Patient Instructions on file for this visit.  No follow-ups on file.     Signature:  Erin Mock MD      Date of encounter: 6/10/25

## 2025-06-25 ENCOUNTER — HOSPITAL ENCOUNTER (OUTPATIENT)
Facility: HOSPITAL | Age: 60
Discharge: HOME | End: 2025-06-25
Attending: ANESTHESIOLOGY | Admitting: ANESTHESIOLOGY
Payer: MEDICARE

## 2025-06-25 DIAGNOSIS — M46.1 SACROILIITIS: Primary | ICD-10-CM

## 2025-06-25 PROCEDURE — 27096 INJECT SACROILIAC JOINT: CPT | Mod: 50,,, | Performed by: ANESTHESIOLOGY

## 2025-06-25 PROCEDURE — 27096 INJECT SACROILIAC JOINT: CPT | Mod: 50 | Performed by: ANESTHESIOLOGY

## 2025-06-25 PROCEDURE — 25000003 PHARM REV CODE 250: Performed by: ANESTHESIOLOGY

## 2025-06-25 PROCEDURE — 63600175 PHARM REV CODE 636 W HCPCS: Performed by: ANESTHESIOLOGY

## 2025-06-25 RX ORDER — TRIAMCINOLONE ACETONIDE 40 MG/ML
INJECTION, SUSPENSION INTRA-ARTICULAR; INTRAMUSCULAR
Status: DISCONTINUED | OUTPATIENT
Start: 2025-06-25 | End: 2025-06-25 | Stop reason: HOSPADM

## 2025-06-25 RX ORDER — BUPIVACAINE HYDROCHLORIDE 2.5 MG/ML
INJECTION, SOLUTION EPIDURAL; INFILTRATION; INTRACAUDAL; PERINEURAL
Status: DISCONTINUED | OUTPATIENT
Start: 2025-06-25 | End: 2025-06-25 | Stop reason: HOSPADM

## 2025-06-25 RX ORDER — DIAZEPAM 5 MG/1
10 TABLET ORAL
Status: DISCONTINUED | OUTPATIENT
Start: 2025-06-25 | End: 2025-06-25 | Stop reason: HOSPADM

## 2025-06-25 RX ORDER — LIDOCAINE HYDROCHLORIDE 10 MG/ML
INJECTION, SOLUTION EPIDURAL; INFILTRATION; INTRACAUDAL; PERINEURAL
Status: DISCONTINUED | OUTPATIENT
Start: 2025-06-25 | End: 2025-06-25 | Stop reason: HOSPADM

## 2025-06-25 RX ORDER — TRIAMCINOLONE ACETONIDE 40 MG/ML
INJECTION, SUSPENSION INTRA-ARTICULAR; INTRAMUSCULAR
Status: DISCONTINUED
Start: 2025-06-25 | End: 2025-06-25 | Stop reason: HOSPADM

## 2025-06-25 RX ORDER — BUPIVACAINE HYDROCHLORIDE 2.5 MG/ML
INJECTION, SOLUTION EPIDURAL; INFILTRATION; INTRACAUDAL; PERINEURAL
Status: DISCONTINUED
Start: 2025-06-25 | End: 2025-06-25 | Stop reason: HOSPADM

## 2025-06-25 RX ORDER — LIDOCAINE HYDROCHLORIDE 10 MG/ML
INJECTION, SOLUTION EPIDURAL; INFILTRATION; INTRACAUDAL; PERINEURAL
Status: DISCONTINUED
Start: 2025-06-25 | End: 2025-06-25 | Stop reason: HOSPADM

## 2025-06-25 RX ADMIN — DIAZEPAM 10 MG: 5 TABLET ORAL at 11:06

## 2025-06-25 NOTE — OP NOTE
Bilateral Sacroiliac Joint Injections    Pre-Procedure Diagnoses:  1. Chronic pain syndrome  2. Bilateral SI joint pain  3. Bilateral sacroiliitis  4. Low back pain    Post-Procedure Diagnoses:  1. Chronic pain syndrome  2. Bilateral SI joint pain  3. Bilateral sacroiliitis  4. Low back pain    Anesthesia:  Local    Estimated Blood Loss:  None    Complications:  None    Informed Consent:  The procedure, risks, benefits, and alternatives were discussed with the patient. There were no contraindications to the procedure. The patient expressed understanding and agreed to proceed. Fully informed written consent was obtained.     Description of the Procedure:  The patient was taken to the operating room. IV access was obtained prior to the start of the procedure. The patient was positioned prone on the fluoroscopy table. Continuous hemodynamic monitoring was initiated and continued throughout the duration of the procedure. The skin overlying the lumbosacral spine was prepped with Chloraprep and draped into a sterile field. Fluoroscopy was used to identify the location of the left SI joint. Skin anesthesia was achieved using 1 mL of 1% lidocaine over the injection site. A 22 gauge 3.5 inch Quinke spinal needle was slowly inserted and advanced under intermittent fluoroscopy through the SI joint capsule. Proper needle position was confirmed under AP, oblique, and lateral fluoroscopic views. Negative aspiration was confirmed. Then a combination of 20 mg of Kenalog and 1 mL of 0.25% bupivacaine was easily injected. There was no pain on injection. The needle was removed intact and bleeding was nil. The same procedure was repeated in identical fashion on the right side. Sterile bandages were applied. The patient was taken to the recovery room for further observation in stable condition. The patient was then discharged home without any complications.

## 2025-06-25 NOTE — PLAN OF CARE
Problem: Fall Injury Risk  Goal: Absence of Fall and Fall-Related Injury  Outcome: Met     Problem: Infection  Goal: Absence of Infection Signs and Symptoms  Outcome: Met     Problem: Pain Acute  Goal: Optimal Pain Control and Function  Outcome: Met     Problem: Adult Inpatient Plan of Care  Goal: Plan of Care Review  Outcome: Met  Goal: Patient-Specific Goal (Individualized)  Outcome: Met  Goal: Absence of Hospital-Acquired Illness or Injury  Outcome: Met  Goal: Optimal Comfort and Wellbeing  Outcome: Met  Goal: Readiness for Transition of Care  Outcome: Met     Problem: Bariatric Environmental Safety  Goal: Safety Maintained with Care  Outcome: Met     Problem: Wound  Goal: Optimal Coping  Outcome: Met  Goal: Optimal Functional Ability  Outcome: Met  Goal: Absence of Infection Signs and Symptoms  Outcome: Met  Goal: Improved Oral Intake  Outcome: Met  Goal: Optimal Pain Control and Function  Outcome: Met  Goal: Skin Health and Integrity  Outcome: Met  Goal: Optimal Wound Healing  Outcome: Met

## 2025-06-25 NOTE — DISCHARGE SUMMARY
[facility] - Periop Services  Discharge Note  Short Stay    Procedure(s) (LRB):  INJECTION,SACROILIAC JOINT / Bilateral (Bilateral)      OUTCOME: Patient tolerated treatment/procedure well without complication and is now ready for discharge.    DISPOSITION: Home or Self Care    FINAL DIAGNOSIS:  <principal problem not specified>    FOLLOWUP: In clinic    DISCHARGE INSTRUCTIONS:  No discharge procedures on file.     TIME SPENT ON DISCHARGE: 5 minutes

## 2025-06-26 VITALS
SYSTOLIC BLOOD PRESSURE: 110 MMHG | TEMPERATURE: 98 F | DIASTOLIC BLOOD PRESSURE: 65 MMHG | HEART RATE: 76 BPM | RESPIRATION RATE: 16 BRPM | BODY MASS INDEX: 40.65 KG/M2 | WEIGHT: 222.25 LBS | OXYGEN SATURATION: 98 %

## (undated) DEVICE — Device

## (undated) DEVICE — ADAPTER DUAL NSL LUER M-M 7FT

## (undated) DEVICE — DRAPE TOP 53X102IN

## (undated) DEVICE — SUT SILK 0 SH 30IN BLK BR

## (undated) DEVICE — CHLORAPREP 10.5 ML APPLICATOR

## (undated) DEVICE — NDL HYPO REG 25G X 1 1/2

## (undated) DEVICE — DRAPE MEDIUM SHEET 40X70IN

## (undated) DEVICE — NDL FLTR 5MCRN BLNT TIP 18GX1

## (undated) DEVICE — COVER C-ARM STRAP BAND 44X80IN

## (undated) DEVICE — DRAPE UTILITY W/ TAPE 20X30IN

## (undated) DEVICE — CONTRAST ISOVUE M 200 20ML VIL

## (undated) DEVICE — APPLICATOR CHLORAPREP ORN 26ML

## (undated) DEVICE — TAPE ADH MEDIPORE 4 X 10YDS

## (undated) DEVICE — NDL SAFETY 25G X 1.5 ECLIPSE

## (undated) DEVICE — CANNULA AIRLIFE ETCO2 NSL 7FT

## (undated) DEVICE — SYR 3CC LUER LOC

## (undated) DEVICE — GLOVE SIGNATURE MICRO LTX 6.5

## (undated) DEVICE — NDL SYR 10ML 18X1.5 LL BLUNT

## (undated) DEVICE — SYR DISP LL 5CC

## (undated) DEVICE — CLOSURE SKIN STERI STRIP 1/2X4

## (undated) DEVICE — NDL SPINAL 22GA 3.5 IN QUINCKE

## (undated) DEVICE — GLOVE SENSICARE PI MICRO 6

## (undated) DEVICE — SYR 3ML LL 18GA 1.5IN

## (undated) DEVICE — SET SMARTSITE EXT SMALLBORE NF

## (undated) DEVICE — GAUZE SPONGE 4X4 12PLY

## (undated) DEVICE — NDL EPIDURAL TOUHY 18G X3.5

## (undated) DEVICE — BANDAGE SHEER STRIP 3/4X3IN

## (undated) DEVICE — GAUZE VISTEC XR DTECT 16 4X4IN

## (undated) DEVICE — SYR EPILOR LUER-LOK LOR 7ML

## (undated) DEVICE — SYR 10CC LUER LOCK

## (undated) DEVICE — PACK BASIC SURGICAL STERILE

## (undated) DEVICE — DRESSING TRANS 4X4 TEGADERM

## (undated) DEVICE — NDL QUINCKE S/SU 22GA 5IN

## (undated) DEVICE — DRAPE ORTH SPLIT 77X108IN

## (undated) DEVICE — DRAPE FULL SHEET 70X100IN

## (undated) DEVICE — GLOVE PROTEXIS LTX MICRO 6.5

## (undated) DEVICE — SYR W NDL BLN FILL 5ML 18GX1.5

## (undated) DEVICE — CABLE EXT SPECTRA 2FT 1X16

## (undated) DEVICE — NDL 18GA X1 1/2 REG BEVEL

## (undated) DEVICE — CONTAINER SPECIMEN SCREW 4OZ

## (undated) DEVICE — POSITIONER HEAD ADULT

## (undated) DEVICE — KIT SURGICAL TURNOVER

## (undated) DEVICE — GLOVE PROTEXIS PI CRM 6.5